# Patient Record
Sex: FEMALE | Race: WHITE | HISPANIC OR LATINO | Employment: OTHER | URBAN - METROPOLITAN AREA
[De-identification: names, ages, dates, MRNs, and addresses within clinical notes are randomized per-mention and may not be internally consistent; named-entity substitution may affect disease eponyms.]

---

## 2017-04-06 ENCOUNTER — HOSPITAL ENCOUNTER (OUTPATIENT)
Facility: HOSPITAL | Age: 82
Discharge: HOME | End: 2017-04-09
Attending: INTERNAL MEDICINE | Admitting: INTERNAL MEDICINE
Payer: MEDICARE

## 2017-04-06 DIAGNOSIS — J44.1 COPD EXACERBATION: ICD-10-CM

## 2017-04-06 PROBLEM — K21.9 GERD (GASTROESOPHAGEAL REFLUX DISEASE): Status: ACTIVE | Noted: 2017-04-06

## 2017-04-06 PROBLEM — E11.9 DIABETES MELLITUS TYPE II: Status: ACTIVE | Noted: 2017-04-06

## 2017-04-06 PROBLEM — I10 HYPERTENSION: Status: ACTIVE | Noted: 2017-04-06

## 2017-04-06 LAB
ANION GAP SERPL CALC-SCNC: 8 MMOL/L
APTT BLDCRRT: 24.6 SEC
BASOPHILS # BLD AUTO: 0.1 K/UL
BASOPHILS NFR BLD: 0.6 %
BUN SERPL-MCNC: 14 MG/DL
CALCIUM SERPL-MCNC: 8.8 MG/DL
CHLORIDE SERPL-SCNC: 105 MMOL/L
CO2 SERPL-SCNC: 28 MMOL/L
CREAT SERPL-MCNC: 1.1 MG/DL
DIFFERENTIAL METHOD: ABNORMAL
EOSINOPHIL # BLD AUTO: 0.2 K/UL
EOSINOPHIL NFR BLD: 1.8 %
ERYTHROCYTE [DISTWIDTH] IN BLOOD BY AUTOMATED COUNT: 16.5 %
EST. GFR  (AFRICAN AMERICAN): 54 ML/MIN/1.73 M^2
EST. GFR  (NON AFRICAN AMERICAN): 47 ML/MIN/1.73 M^2
GLUCOSE SERPL-MCNC: 89 MG/DL
HCT VFR BLD AUTO: 39.1 %
HGB BLD-MCNC: 12.5 G/DL
INR PPP: 1
LYMPHOCYTES # BLD AUTO: 1.5 K/UL
LYMPHOCYTES NFR BLD: 17.6 %
MCH RBC QN AUTO: 23.8 PG
MCHC RBC AUTO-ENTMCNC: 31.9 %
MCV RBC AUTO: 75 FL
MONOCYTES # BLD AUTO: 0.9 K/UL
MONOCYTES NFR BLD: 10.5 %
NEUTROPHILS # BLD AUTO: 5.9 K/UL
NEUTROPHILS NFR BLD: 69.5 %
PLATELET # BLD AUTO: 300 K/UL
PMV BLD AUTO: 8.2 FL
POCT GLUCOSE: 112 MG/DL (ref 70–110)
POTASSIUM SERPL-SCNC: 3.7 MMOL/L
PROTHROMBIN TIME: 10.7 SEC
RBC # BLD AUTO: 5.23 M/UL
SODIUM SERPL-SCNC: 141 MMOL/L
WBC # BLD AUTO: 8.5 K/UL

## 2017-04-06 PROCEDURE — 85730 THROMBOPLASTIN TIME PARTIAL: CPT

## 2017-04-06 PROCEDURE — 99219 PR INITIAL OBSERVATION CARE,LEVL II: CPT | Mod: ,,, | Performed by: INTERNAL MEDICINE

## 2017-04-06 PROCEDURE — 25000242 PHARM REV CODE 250 ALT 637 W/ HCPCS: Performed by: INTERNAL MEDICINE

## 2017-04-06 PROCEDURE — G0379 DIRECT REFER HOSPITAL OBSERV: HCPCS

## 2017-04-06 PROCEDURE — 36415 COLL VENOUS BLD VENIPUNCTURE: CPT

## 2017-04-06 PROCEDURE — 93005 ELECTROCARDIOGRAM TRACING: CPT

## 2017-04-06 PROCEDURE — 83036 HEMOGLOBIN GLYCOSYLATED A1C: CPT

## 2017-04-06 PROCEDURE — 80048 BASIC METABOLIC PNL TOTAL CA: CPT

## 2017-04-06 PROCEDURE — 94640 AIRWAY INHALATION TREATMENT: CPT

## 2017-04-06 PROCEDURE — 63600175 PHARM REV CODE 636 W HCPCS: Performed by: INTERNAL MEDICINE

## 2017-04-06 PROCEDURE — 85025 COMPLETE CBC W/AUTO DIFF WBC: CPT

## 2017-04-06 PROCEDURE — 94761 N-INVAS EAR/PLS OXIMETRY MLT: CPT

## 2017-04-06 PROCEDURE — 87040 BLOOD CULTURE FOR BACTERIA: CPT | Mod: 59

## 2017-04-06 PROCEDURE — 25000003 PHARM REV CODE 250: Performed by: INTERNAL MEDICINE

## 2017-04-06 PROCEDURE — 85610 PROTHROMBIN TIME: CPT

## 2017-04-06 PROCEDURE — 94640 AIRWAY INHALATION TREATMENT: CPT | Mod: 76

## 2017-04-06 PROCEDURE — G0378 HOSPITAL OBSERVATION PER HR: HCPCS

## 2017-04-06 RX ORDER — IBUPROFEN 200 MG
16 TABLET ORAL
Status: DISCONTINUED | OUTPATIENT
Start: 2017-04-06 | End: 2017-04-09 | Stop reason: HOSPADM

## 2017-04-06 RX ORDER — FLUTICASONE FUROATE AND VILANTEROL 100; 25 UG/1; UG/1
1 POWDER RESPIRATORY (INHALATION) DAILY
Status: DISCONTINUED | OUTPATIENT
Start: 2017-04-06 | End: 2017-04-09 | Stop reason: HOSPADM

## 2017-04-06 RX ORDER — HYDROCODONE BITARTRATE AND ACETAMINOPHEN 5; 325 MG/1; MG/1
1 TABLET ORAL EVERY 4 HOURS PRN
Status: DISCONTINUED | OUTPATIENT
Start: 2017-04-06 | End: 2017-04-09 | Stop reason: HOSPADM

## 2017-04-06 RX ORDER — TRIAMTERENE AND HYDROCHLOROTHIAZIDE 37.5; 25 MG/1; MG/1
1 CAPSULE ORAL DAILY
Status: DISCONTINUED | OUTPATIENT
Start: 2017-04-07 | End: 2017-04-07

## 2017-04-06 RX ORDER — GABAPENTIN 300 MG/1
300 CAPSULE ORAL 3 TIMES DAILY
COMMUNITY
End: 2019-04-23 | Stop reason: SDUPTHER

## 2017-04-06 RX ORDER — METHYLPREDNISOLONE SOD SUCC 125 MG
62.5 VIAL (EA) INJECTION EVERY 12 HOURS
Status: DISCONTINUED | OUTPATIENT
Start: 2017-04-06 | End: 2017-04-08

## 2017-04-06 RX ORDER — CODEINE PHOSPHATE AND GUAIFENESIN 10; 100 MG/5ML; MG/5ML
5 SOLUTION ORAL EVERY 4 HOURS PRN
COMMUNITY
End: 2017-12-19

## 2017-04-06 RX ORDER — ATORVASTATIN CALCIUM 10 MG/1
10 TABLET, FILM COATED ORAL DAILY
COMMUNITY
End: 2019-04-23 | Stop reason: SDUPTHER

## 2017-04-06 RX ORDER — IPRATROPIUM BROMIDE AND ALBUTEROL SULFATE 2.5; .5 MG/3ML; MG/3ML
3 SOLUTION RESPIRATORY (INHALATION)
Status: DISCONTINUED | OUTPATIENT
Start: 2017-04-06 | End: 2017-04-08

## 2017-04-06 RX ORDER — INSULIN ASPART 100 [IU]/ML
0-5 INJECTION, SOLUTION INTRAVENOUS; SUBCUTANEOUS
Status: DISCONTINUED | OUTPATIENT
Start: 2017-04-06 | End: 2017-04-09 | Stop reason: HOSPADM

## 2017-04-06 RX ORDER — GLIMEPIRIDE 1 MG/1
1 TABLET ORAL
Status: DISCONTINUED | OUTPATIENT
Start: 2017-04-07 | End: 2017-04-09 | Stop reason: HOSPADM

## 2017-04-06 RX ORDER — IBUPROFEN 200 MG
24 TABLET ORAL
Status: DISCONTINUED | OUTPATIENT
Start: 2017-04-06 | End: 2017-04-09 | Stop reason: HOSPADM

## 2017-04-06 RX ORDER — CLONIDINE HYDROCHLORIDE 0.1 MG/1
0.1 TABLET ORAL 2 TIMES DAILY
Status: DISCONTINUED | OUTPATIENT
Start: 2017-04-06 | End: 2017-04-06

## 2017-04-06 RX ORDER — LOSARTAN POTASSIUM 25 MG/1
50 TABLET ORAL DAILY
Status: DISCONTINUED | OUTPATIENT
Start: 2017-04-06 | End: 2017-04-09 | Stop reason: HOSPADM

## 2017-04-06 RX ORDER — CLONIDINE HYDROCHLORIDE 0.1 MG/1
0.1 TABLET ORAL 2 TIMES DAILY PRN
Status: DISCONTINUED | OUTPATIENT
Start: 2017-04-06 | End: 2017-04-09 | Stop reason: HOSPADM

## 2017-04-06 RX ORDER — MOXIFLOXACIN HYDROCHLORIDE 400 MG/250ML
400 INJECTION, SOLUTION INTRAVENOUS
Status: DISCONTINUED | OUTPATIENT
Start: 2017-04-06 | End: 2017-04-09 | Stop reason: HOSPADM

## 2017-04-06 RX ORDER — FLUTICASONE FUROATE AND VILANTEROL 100; 25 UG/1; UG/1
1 POWDER RESPIRATORY (INHALATION) DAILY
Status: DISCONTINUED | OUTPATIENT
Start: 2017-04-06 | End: 2017-04-06 | Stop reason: SDUPTHER

## 2017-04-06 RX ORDER — DOXYCYCLINE 100 MG/1
100 CAPSULE ORAL EVERY 12 HOURS
COMMUNITY
End: 2017-12-19

## 2017-04-06 RX ORDER — GLUCAGON 1 MG
1 KIT INJECTION
Status: DISCONTINUED | OUTPATIENT
Start: 2017-04-06 | End: 2017-04-09 | Stop reason: HOSPADM

## 2017-04-06 RX ORDER — ACETAMINOPHEN 325 MG/1
650 TABLET ORAL EVERY 6 HOURS PRN
Status: DISCONTINUED | OUTPATIENT
Start: 2017-04-06 | End: 2017-04-09 | Stop reason: HOSPADM

## 2017-04-06 RX ORDER — METOPROLOL TARTRATE 25 MG/1
25 TABLET, FILM COATED ORAL 2 TIMES DAILY
Status: DISCONTINUED | OUTPATIENT
Start: 2017-04-06 | End: 2017-04-09 | Stop reason: HOSPADM

## 2017-04-06 RX ORDER — IPRATROPIUM BROMIDE AND ALBUTEROL SULFATE 2.5; .5 MG/3ML; MG/3ML
3 SOLUTION RESPIRATORY (INHALATION)
Status: DISCONTINUED | OUTPATIENT
Start: 2017-04-06 | End: 2017-04-06 | Stop reason: SDUPTHER

## 2017-04-06 RX ORDER — ENOXAPARIN SODIUM 100 MG/ML
40 INJECTION SUBCUTANEOUS EVERY 24 HOURS
Status: DISCONTINUED | OUTPATIENT
Start: 2017-04-06 | End: 2017-04-09 | Stop reason: HOSPADM

## 2017-04-06 RX ORDER — BUDESONIDE AND FORMOTEROL FUMARATE DIHYDRATE 160; 4.5 UG/1; UG/1
2 AEROSOL RESPIRATORY (INHALATION) EVERY 12 HOURS
Status: DISCONTINUED | OUTPATIENT
Start: 2017-04-06 | End: 2017-04-06

## 2017-04-06 RX ORDER — PANTOPRAZOLE SODIUM 40 MG/1
40 TABLET, DELAYED RELEASE ORAL DAILY
Status: DISCONTINUED | OUTPATIENT
Start: 2017-04-06 | End: 2017-04-09 | Stop reason: HOSPADM

## 2017-04-06 RX ADMIN — FLUTICASONE FUROATE AND VILANTEROL TRIFENATATE 1 PUFF: 100; 25 POWDER RESPIRATORY (INHALATION) at 03:04

## 2017-04-06 RX ADMIN — IPRATROPIUM BROMIDE AND ALBUTEROL SULFATE 3 ML: .5; 3 SOLUTION RESPIRATORY (INHALATION) at 07:04

## 2017-04-06 RX ADMIN — LOSARTAN POTASSIUM 50 MG: 25 TABLET, FILM COATED ORAL at 03:04

## 2017-04-06 RX ADMIN — MOXIFLOXACIN HYDROCHLORIDE 400 MG: 400 INJECTION, SOLUTION INTRAVENOUS at 03:04

## 2017-04-06 RX ADMIN — METOPROLOL TARTRATE 25 MG: 25 TABLET ORAL at 08:04

## 2017-04-06 RX ADMIN — ENOXAPARIN SODIUM 40 MG: 100 INJECTION SUBCUTANEOUS at 07:04

## 2017-04-06 RX ADMIN — IPRATROPIUM BROMIDE AND ALBUTEROL SULFATE 3 ML: .5; 3 SOLUTION RESPIRATORY (INHALATION) at 03:04

## 2017-04-06 RX ADMIN — METHYLPREDNISOLONE SODIUM SUCCINATE 62.5 MG: 125 INJECTION, POWDER, FOR SOLUTION INTRAMUSCULAR; INTRAVENOUS at 08:04

## 2017-04-06 RX ADMIN — PANTOPRAZOLE SODIUM 40 MG: 40 TABLET, DELAYED RELEASE ORAL at 03:04

## 2017-04-06 NOTE — ASSESSMENT & PLAN NOTE
Caller would like to discuss an/a Return call for results . Writer advised caller of callback within 24-72 hours.    Patient Name: Julieth Flower  Caller Name: SAME   Name of Facility:   Fax Number:   Callback Number: [phone] (M)    Additional Info:  She would like to talk to the nurse. She states that she never received her PAP results from a couple of months ago. She also states that she has a bacterial infection or yeast infection and is wondering if she can be prescribed something for it.       Thank you,  Maribel Hauser     Supplemental O2 via nasal canula; titrate O2 saturation to >92%.   Start Solumedrol 62.5 mg IV q 12 hrs.   Continue beta 2 agonist bronchodilator treatments.   Continue IV antibiotics.  Check sputum GS and Cx.   Continue routine medications as before.

## 2017-04-06 NOTE — PROGRESS NOTES
Unable to verify medications with pt's daughter Michelle. Phone call placed to 's office, voicemail left, awaiting call back.

## 2017-04-06 NOTE — PLAN OF CARE
04/06/17 1536   Patient Assessment/Suction   Level of Consciousness (AVPU) alert   Respiratory Effort Normal;Unlabored   All Lung Fields Breath Sounds coarse   Cough Frequency frequent   Cough Type good;nonproductive   PRE-TX-O2-ETCO2   O2 Device (Oxygen Therapy) room air   SpO2 97 %   Pulse Oximetry Type Intermittent   $ Pulse Oximetry - Multiple Charge Pulse Oximetry - Multiple   Pulse 80   Resp 16   Positioning Sitting in bed   Aerosol Therapy   $ Aerosol Therapy Charges Aerosol Treatment   Respiratory Treatment Status given   SVN/Inhaler Treatment Route mask   Position During Treatment Sitting in bed   Patient Tolerance good   Inhaler   $ Inhaler Charges MDI (Metered Dose [place]ah[place]) Treatment   Respiratory Treatment Status given   SVN/Inhaler Treatment Route mouthpiece   Patient Tolerance good   Post-Treatment   Post-treatment Heart Rate (beats/min) 83   Post-treatment Resp Rate (breaths/min) 16   All Fields Breath Sounds aeration increased

## 2017-04-06 NOTE — IP AVS SNAPSHOT
[address] [place] [address]  Phone: [phone]           Instrucciones de Ranjana de Pacientes  Nuestra meta es prepararlo para el éxito. Kathy paquete incluye información sobre torrez condición, medicamentos e instrucciones para cuidados en el hogar. Linnell Camp le ayudará a cuidarse y prevenir la necesidad de volver al hospital.     Por favor, hable con torrez enfermero o enfermera si tiene alguna pregunta..     Hay muchos detalles a recordar cuando se prepara para salir del hospital. Linnell Camp es lo que necesita hacer:    1. Bonney torrez medicina. Si usted tiene kalin receta, revise la lista de medicamentos en las siguientes páginas. Es posible que tenga nuevos medicamentos para recoger en la farmacia y otros que tendrá que dejar de guanako. Revise las instrucciones sobre cómo y cuándo guanako sarah medicamentos. Consulte con el médico o el enfermeras si no está seguro de qué hacer.    2. Ir a sarah citas de seguimiento. La información específica de seguimiento aparece en las siguientes páginas. Usted puede ser contactado por kalin enfermera o proveedor clínico sobre las próximas citas. Estar seguro de que tiene todos los números de teléfono para comunicarse si es necesario. Por favor, póngase en contacto con la oficina de torrez profesional médico si no puede hacer kalin breann.     3. Esté atento a señales de advertencia. El médico o la enfermera le dará señales de alarma detallados que debe observar y cuándo llamar para la ayuda. Estas instrucciones también pueden incluir información educativa acerca de torrez condición. Si usted experimenta cualquiera de las señales de advertencia para torrez zohreh, llame a torrez médico.             [facility]    Si torrez médico no le ha indicado a lo contrário, por favor   contactar a [facility], nuestra línea de cuidado de enfermeras está disponible para asistirle 24/7.    [phone] (servicio gratuito)    Enfermeras registradas de [facility] pueden ayudarle a  reservar kalin breann, proveer educación para la zohreh, asesoría clínica, y otros servicios de asesoramiento.                  ** Verificar la lista de medicamentos es correcta y está actualizada. Llevar esto con usted en librado de emergencia. Si sarah medicamentos white cambiado, por favor notifique a torrez proveedor de atención médica.             Lista de medicamentos      EMPIECE a guanako estos medicamentos        Additional Info                      albuterol 90 mcg/actuation inhaler   Cantidad:  1 Inhaler   Recargas:  1   Dosis:  2 puff    Instrucciones:  Inhale 2 puffs into the lungs every 6 (six) hours as needed for Wheezing. Rescue     Begin Date    AM    Noon    PM    Bedtime       moxifloxacin 400 mg tablet   También conocido galindo:  AVELOX   Cantidad:  10 tablet   Recargas:  0   Dosis:  400 mg    Instrucciones:  Take 1 tablet (400 mg total) by mouth once daily.     Begin Date    AM    Noon    PM    Bedtime       predniSONE 50 MG Tab   También conocido galindo:  DELTASONE   Cantidad:  5 tablet   Recargas:  0   Dosis:  50 mg    Instrucciones:  Take 1 tablet (50 mg total) by mouth once daily.     Begin Date    AM    Noon    PM    Bedtime         SIGA tomando estos medicamentos        Additional Info                      atorvastatin 10 MG tablet   También conocido galindo:  LIPITOR   Recargas:  0   Dosis:  10 mg    Instrucciones:  Take 10 mg by mouth once daily.     Begin Date    AM    Noon    PM    Bedtime       doxycycline 100 MG capsule   También conocido galindo:  MONODOX   Recargas:  0   Dosis:  100 mg    Instrucciones:  Take 100 mg by mouth every 12 (twelve) hours.     Begin Date    AM    Noon    PM    Bedtime       FOSAMAX ORAL   Recargas:  0   Dosis:  1 tablet    Instrucciones:  Take 1 tablet by mouth once a week. 70mg po weekly     Begin Date    AM    Noon    PM    Bedtime       gabapentin 300 MG capsule   También conocido galindo:  NEURONTIN   Recargas:  0   Dosis:  300 mg    Instrucciones:  Take 300 mg by mouth 3 (three)  times daily.     Begin Date    AM    Noon    PM    Bedtime       glimepiride 1 MG tablet   También conocido galindo:  AMARYL   Recargas:  0   Dosis:  2 mg    Última administración:  1 mg on 4/9/2017  6:49 AM   Instrucciones:  Take 2 mg by mouth before breakfast.     Begin Date    AM    Noon    PM    Bedtime       guaifenesin-codeine 100-10 mg/5 ml  mg/5 mL syrup   También conocido galindo:  TUSSI-ORGANIDIN NR   Recargas:  0   Dosis:  5 mL    Instrucciones:  Take 5 mLs by mouth every 4 (four) hours as needed for Cough.     Begin Date    AM    Noon    PM    Bedtime       losartan 50 MG tablet   También conocido galindo:  COZAAR   Recargas:  0    Última administración:  50 mg on 4/9/2017  9:59 AM     Begin Date    AM    Noon    PM    Bedtime       meclizine 12.5 mg tablet   También conocido galindo:  ANTIVERT   Recargas:  0      Begin Date    AM    Noon    PM    Bedtime       metoprolol tartrate 25 MG tablet   También conocido galindo:  LOPRESSOR   Recargas:  0   Dosis:  25 mg    Última administración:  25 mg on 4/9/2017  9:59 AM   Instrucciones:  Take 25 mg by mouth 2 (two) times daily.     Begin Date    AM    Noon    PM    Bedtime       NEXIUM 40 MG capsule   Recargas:  0   Medicamento genérico:  esomeprazole      Begin Date    AM    Noon    PM    Bedtime       ramipril 2.5 MG capsule   También conocido galindo:  ALTACE   Recargas:  0   Dosis:  2.5 mg    Instrucciones:  Take 2.5 mg by mouth once daily.     Begin Date    AM    Noon    PM    Bedtime       salicylic acid 6 % Crea   También conocido galindo:  SALACYN   Cantidad:  454 g   Recargas:  11   Dosis:  1 application    Instrucciones:  Apply 1 application topically 2 (two) times daily.     Begin Date    AM    Noon    PM    Bedtime       SYMBICORT 160-4.5 mcg/actuation Hfaa   Recargas:  1   Medicamento genérico:  budesonide-formoterol 160-4.5 mcg    Instrucciones:  INL 2 PFS PO BID     Begin Date    AM    Noon    PM    Bedtime       VOLTAREN 1 % Gel   Recargas:  0   Medicamento  genérico:  diclofenac sodium      Begin Date    AM    Noon    PM    Bedtime            Dónde puede recoger sarah medicamentos      Estos medicamentos fueron enviados a [facility] [address] [place] [address], [place] - [address] [facility] AT [facility]  [address] [facility], [place] [address]    Horas:  24-horas Teléfono:  [phone]     albuterol 90 mcg/actuation inhaler    moxifloxacin 400 mg tablet    predniSONE 50 MG Tab                  Por favor traiga a todos las citas de seguimiento:    1. Melanie copia de las instrucciones de sánchez.  2. Todos los medicamentos que está tomando kallie momento, en sarah envases originales.  3. Identificación y tarjeta de seguro.    Por favor llegue 15 minutos antes de la hora de la breann.    Por favor llame con 24 horas de antelación si tiene que cambiar torrez breann y / o tiempo.        Información de seguimiento     Realice un seguimiento con:  Mervin Killian MD    Cuándo:  4/16/2017    Especialidad:  Family Medicine    Información de contacto:    [address] [facility]  [place] [address] [phone]          Instrucciones de sánchez     [place]es [place]    Call MD for:  difficulty breathing or increased cough     Call MD for:  increased confusion or weakness     Call MD for:  persistent nausea and vomiting or diarrhea     Call MD for:  temperature >100.4     Diet general     Preguntas:    Total calories:      Fat restriction, if any:      Protein restriction, if any:      Na restriction, if any:      Fluid restriction:      Additional restrictions:        Referencias/Adjuntos de sánchez     COPD, TREATMENT FOR (Macedonian)    COPD, WHAT IS (Macedonian)    CHRONIC OBSTRUCTIVE PULMONARY DISEASE (COPD), DISCHARGE INSTRUCTIONS (Macedonian)        Primary Diagnosis     Torrez diagnosis primaria es:  Chronic Bronchitis      Información de Admisión     Fecha y hora Proveedor Departamento [facility]    4/6/2017 11:27 AM Kathy Felipe MD [facility] [phone]      Los proveedores de cuidado     Personal Médico  "Rol Especialidad Teléfono principal de la oficina    Kathy Felipe MD Attending Provider Internal Medicine [phone]      Kerry signos vitales luís     PS Pulso Temperatura Resp Hedley Peso    132/60 86 97.9 °F (36.6 °C) (Oral) 18 4' 8" (1.422 m) 69.7 kg (153 lb 10.6 oz)    Ultima menstruación SpO2 BMI (IMC)             (LMP Unknown) 96% 34.45 kg/m2         Recent Lab Values        4/6/2017                           2:47 PM           A1C 5.9           Comment for A1C at  2:47 PM on 4/6/2017:  According to ADA guidelines, hemoglobin A1C <7.0% represents  optimal control in non-pregnant diabetic patients.  Different  metrics may apply to specific populations.   Standards of Medical Care in Diabetes - 2016.  For the purpose of screening for the presence of diabetes:  <5.7%     Consistent with the absence of diabetes  5.7-6.4%  Consistent with increasing risk for diabetes   (prediabetes)  >or=6.5%  Consistent with diabetes  Currently no consensus exists for use of hemoglobin A1C  for diagnosis of diabetes for children.        Pending Labs     Order Current Status    Blood culture (site 1) Preliminary result    Blood culture (site 2) Preliminary result    Culture, Respiratory with Gram Stain Preliminary result      Alergias     A partir del:  4/9/2017           Reacciones    Pcn [Penicillins] Anaphylaxis      Directiva Anticipada     Melanie directiva anticipada es un documento que, en librado de que ya no capaz de hacer decisiones por sí mismo, le dice a torrez equipo médico qué tipo de tratamiento quiere o no quiere recibir, o que le gustaría guanako esas decisiones para usted . Si actualmente no tiene melanie directiva anticipada, [facility] le anima a crear ayaka. Para más información llame al: [phone] ([phone]), o entrando en [email or web address].        Language Assistance Services     ATTENTION: Language assistance services are available, free of charge. Please call [phone].  "     ATENCIÓN: Si habla español, tiene a torrez disposición servicios gratuitos de asistencia lingüística. Llame al [phone].     [facility] Ý: N?u b?n nói Ti?ng Vi?t, có các d?ch v? h? tr? ngôn ng? mi?n phí dành cho b?n. G?i s? [phone].        Instrucciones de sánchez para la diabetes       Diabetes (Información general)  La diabetes es un problema de zohreh a tomer plazo que significa que torrez cuerpo no produce la suficiente insulina. O también puede significar que torrez cuerpo no puede utilizar la insulina que produce. La insulina es kalin hormona en torrez organismo, que permite que el azúcar en la sidra (glucosa) llegue a las células en torrez cuerpo. Todas sarah células necesitan glucosa galindo combustible.  Cuando usted tiene diabetes la glucosa en torrez cuerpo se acumula porque no puede llegar hasta las células. Esta acumulación se conoce galindo un nivel alto de azúcar en la sidra (hiperglucemia).  Torrez nivel de azúcar en la sidra depende de varias cosas. Depende de la clase de alimentos que usted come y de cuánto come. También depende de cuánto ejercicio hace y de cuánta insulina tiene en torrez organismo. [place] mucho de las clases indebidas de alimentos o no neyda a tiempo los medicamentos para la diabetes puede causar con el tiempo un nivel alto de azúcar en la sidra. Las infecciones pueden provocar un nivel alto de azúcar en la sidra, incluso si está tomando sarah medicamentos correctamente.  Estas cosas también pueden causar un nivel bajo de azúcar en la sidra:  · Saltarse las comidas  · No comer suficientes alimentos  · Neyda demasiado de un medicamento para la diabetes  Con el tiempo, si no se trata la diabetes puede causar problemas serios. Estos problemas incluyen enfermedades del corazón, ataque cerebral, insuficiencia renal y ceguera. También pueden incluir dolor en los nervios o pérdida de sensación en sarah piernas o pies. Al mantener torrez azúcar en la sidra bajo control usted puede prevenir o retrasar estos problemas.  Los  niveles normales en la sidra son de 80 a 130 antes de las comidas y menos de 180 de 1 a 2 horas después de comer.    Cuidados en el hogar  Cuando se esté cuidando usted mismo en torrez hogar, siga estas pautas:  · Siga la dieta que le recomiende torrez proveedor de atención médica.  Use la insulina o cualquier otro medicamento para la diabetes, exactamente galindo se lo indiquen.  · Vigile sarah niveles de azúcar en la sidra galindo le indiquen. Lleve un registro con los resultados. [place] le ayudará a torrez proveedor a cambiar sarah medicamentos para mantener el azúcar en la sidra bajo control.   · Trate de alcanzar torrez peso ideal. Es posible que pueda reducir o dejar de guanako medicamentos para la diabetes si come los alimentos adecuados y hace ejercicio.  · No fume. Fumar hace que los efectos de la diabetes empeoren en torrez circulación. Si usted fuma y tiene diabetes es mucho más probable que sufra un ataque al corazón.   · Cuídese arabella sarah pies. Si ricci perdido la sensación en sraah pies, es posible que no bri kalin herida o kalin infección. Revise sarah pies y la joshua entre los dedos por lo menos kalin vez a la semana.    · Utilice torrez brazalete de alerta médica o lleve kalin tarjeta en torrez billetera que diga que usted tiene diabetes. [place] les ayudará a los proveedores de atención médica a brindarle los cuidados adecuados si usted se enferma hasta el punto que no pueda decirles que tiene diabetes.  Plan para un día de enfermedad  Si usted contrae un resfriado, la gripe o kalin infección viral, siga estos pasos:  · Revise torrez plan para un día de enfermedad de la diabetes y llame a torrez proveedor de atención médica galindo le indicaron que lo hiciera. Es posible que deba llamar al proveedor de inmediato si:  ¨ Torrez nivel de azúcar en la sidra está por encima de 240 a pesar de estar tomando los medicamentos para la diabetes  ¨ Los niveles de cetonas en torrez orina están por encima de lo normal o altos  ¨ Ha estado vomitando gudelia más de 6 horas  ¨ Tiene  dificultades para respirar  ¨ Tiene kalin fiebre sánchez  ¨ Tiene fiebre gudelia varios días y no mejora  ¨ Se siente más aturdido o somnoliento de lo usual  · Siga tomando sarah pastillas para la diabetes (medicamentos orales) incluso si ha estado vomitando y se siente muy enfermo. Llame a torrez proveedor de inmediato porque es posible que necesite insulina para bajar los niveles de azúcar en la sidra hasta que se recupere de torrez enfermedad.    · Monitoree torrez insulina incluso si ha estado vomitando y se siente muy enfermo. Llame a torrez proveedor de inmediato y pregunte si necesita cambiar torrez dosis de insulina. [place] dependerá de los resultados de sarah niveles de azúcar en la sidra.   · Revise torrez nivel de azúcar en la sidra cada 2 a 4 horas, o por lo menos 4 veces al día.  · Revise con frecuencia sarah cetonas. Si está vomitando y tiene diarrea, revíselas con más frecuencia.  · No se salte comidas. Trate de comer comidas pequeñas a intervalos regulares. Hágalo aunque no sienta gana de comer.  · Antonina agua y otros líquidos que no contengan cafeína o calorías. [place] prevendrá que se deshidrate. Si tiene náuseas o vómito, tome pequeños sorbos cada 5 minutos. Para prevenir la deshidratación trate de beber kalin taza (8 onzas) de líquido cada hora mientras esté despierto.  Cuidados generales  Siempre lleve con usted kalin sarita de azúcar de acción rápida para el librado de que tenga síntomas de un nivel bajo de azúcar en la sidra (menos de 70). A las primeras señales de un nivel bajo de azúcar en la sidra, coma o antonina de 15 a 20 gramos de azúcar de acción rápida para elevar el nivel de azúcar en la sidra. Algunos ejemplos son:    · 3 a 4 tabletas de glucosa. Estas pueden comprarse en la mayoría de las farmacias.  · 4 onzas (1/2 taza) de un refresco (que no sea de dieta)  · 4 onzas (1/2 taza) de cualquier jugo de fruta  · 8 onzas (1 taza) de leche  · 5 a 6 unidades de caramelos duros  · 1 cucharada de miel  Revise torrez nivel de azúcar en la  sidra 15 minutos después de tratarse. Si sigue por debajo de 70, tome de 15 a 20 gramos más de azúcar de acción rápida. Vuelva a revisarse en 15 minutos. Si regresa a lo normal (70 o más), coma un bocadillo o kalin comida para mantener el azúcar en la sidra dentro de un nivel seguro. Si permanece bajo, llame a torrez médico o vaya a kalin laquita de emergencias.  Cuidado de seguimiento  Nell kalin breann de seguimiento con torrez proveedor de atención médica, o galindo le indiquen. Para más información acerca de la diabetes, visite la página web de la Asociación Americana de la Diabetes (American Diabetes Association) en www.diabetes.org o llame al [phone].  Cuándo buscar consejo médico  Llame a torrez proveedor de atención médica de inmediato si tiene cualquiera de estos síntomas de un nivel alto de azúcar en la sidra:    · Orina frecuentemente  · Mareos  · Somnolencia  · Sed  · Dolor de kerri  · Náuseas o vómito  · Dolor abdominal  · Cambios en la vista  · Respiración rápida  · Confusión o pérdida del conocimiento  También llame a torrez proveedor de inmediato si tiene alguna de estas señales de un nivel bajo de azúcar en la sidra:    · Fatiga  · Dolor de kerri  · Temblores  · Sudoración excesiva  · Hambre  · Se siente ansioso o inquieto  · Cambios en la vista  · Somnolencia  · Debilidad  · Confusión o pérdida del conocimiento  Llame a torrez proveedor de inmediato si cualquiera de estos ocurre:  · Dolor en el pecho o falta de aire  · Mareos o desmayos  · Debilidad en un brazo o kalin pierna, o en un lado de la manpreet  · Dificultad para hablar o para rich   Date Last Reviewed: 6/1/2016  © 0619-6048 [employer]. [address] [place] [address], [place] [address]. Todos los derechos reservados. Esta información no pretende sustituir la atención médica profesional. Sólo torrez médico puede diagnosticar y tratar un problema de zohreh.              Registrarse para [facility]     La activación de torrez cuenta [facility] es tan fácil galindo 1-2-3!    1)  Ir a [email or web address], seleccione Registrarse Ahora, meter el código de activación y torrez fecha de nacimiento, y seleccione Próximo.    [id number]  Expires: 5/24/2017 12:51 PM      2) Crear un nombre de usuario y contraseña para usar cuando se visita [facility] en el futuro y selecciona kalin pregunta de seguridad en librado de que pierda torrez contraseña y seleccione Próximo.    3) Introduzca torrez dirección de correo electrónico y yane clic en Registrarse!    Información Adicional  Si tiene alguna pregunta, por favor, e-mail [email or web address] o llame al [phone] para hablar con nuestro personal. Recuerde, [facility] no debe ser usada para necesidades urgentes. En librado de emergencia médica, llame al 911.         [facility] cumple con las leyes federales aplicables de derechos civiles y no discrimina por motivos de rosa, color, origen nacional, edad, discapacidad, o sexo.                        [address] [place] [address]  Phone: [phone]           Patient Discharge Instructions   Our goal is to set you up for success. This packet includes information on your condition, medications, and your home care.  It will help you care for yourself to prevent having to return to the hospital.     Please ask your nurse if you have any questions.      There are many details to remember when preparing to leave the hospital. Here is what you will need to do:    1. Take your medicine. If you are prescribed medications, review your Medication List on the following pages. You may have new medications to  at the pharmacy and others that you'll need to stop taking. Review the instructions for how and when to take your medications. Talk with your doctor or nurses if you are unsure of what to do.     2. Go to your follow-up appointments. Specific follow-up information is listed in the following pages. Your may be contacted by a nurse or clinical provider about future  appointments. Be sure we have all of the phone numbers to reach you. Please contact your provider's office if you are unable to make an appointment.     3. Watch for warning signs. Your doctor or nurse will give you detailed warning signs to watch for and when to call for assistance. These instructions may also include educational information about your condition. If you experience any of warning signs to your health, call your doctor.           [facility]  Unless otherwise directed by your provider, please   contact [facility], our nurse care line   that is available for 24/7 assistance.     [phone] (toll-free)     Registered nurses in the [facility]   provide: appointment scheduling, clinical advisement, health education, and other advisory services.              ** Verificar la lista de medicamentos es correcta y está actualizada. Llevar esto con usted en librado de emergencia. Si sarah medicamentos white cambiado, por favor notifique a torrez proveedor de atención médica.             Medication List      START taking these medications        Additional Info                      albuterol 90 mcg/actuation inhaler   Quantity:  1 Inhaler   Refills:  1   Dose:  2 puff    Instructions:  Inhale 2 puffs into the lungs every 6 (six) hours as needed for Wheezing. Rescue     Begin Date    AM    Noon    PM    Bedtime       moxifloxacin 400 mg tablet   Commonly known as:  AVELOX   Quantity:  10 tablet   Refills:  0   Dose:  400 mg    Instructions:  Take 1 tablet (400 mg total) by mouth once daily.     Begin Date    AM    Noon    PM    Bedtime       predniSONE 50 MG Tab   Commonly known as:  DELTASONE   Quantity:  5 tablet   Refills:  0   Dose:  50 mg    Instructions:  Take 1 tablet (50 mg total) by mouth once daily.     Begin Date    AM    Noon    PM    Bedtime         CONTINUE taking these medications        Additional Info                      atorvastatin 10 MG tablet   Commonly known as:  LIPITOR    Refills:  0   Dose:  10 mg    Instructions:  Take 10 mg by mouth once daily.     Begin Date    AM    Noon    PM    Bedtime       doxycycline 100 MG capsule   Commonly known as:  MONODOX   Refills:  0   Dose:  100 mg    Instructions:  Take 100 mg by mouth every 12 (twelve) hours.     Begin Date    AM    Noon    PM    Bedtime       FOSAMAX ORAL   Refills:  0   Dose:  1 tablet    Instructions:  Take 1 tablet by mouth once a week. 70mg po weekly     Begin Date    AM    Noon    PM    Bedtime       gabapentin 300 MG capsule   Commonly known as:  NEURONTIN   Refills:  0   Dose:  300 mg    Instructions:  Take 300 mg by mouth 3 (three) times daily.     Begin Date    AM    Noon    PM    Bedtime       glimepiride 1 MG tablet   Commonly known as:  AMARYL   Refills:  0   Dose:  2 mg    Last time this was given:  1 mg on 4/9/2017  6:49 AM   Instructions:  Take 2 mg by mouth before breakfast.     Begin Date    AM    Noon    PM    Bedtime       guaifenesin-codeine 100-10 mg/5 ml  mg/5 mL syrup   Commonly known as:  TUSSI-ORGANIDIN NR   Refills:  0   Dose:  5 mL    Instructions:  Take 5 mLs by mouth every 4 (four) hours as needed for Cough.     Begin Date    AM    Noon    PM    Bedtime       losartan 50 MG tablet   Commonly known as:  COZAAR   Refills:  0    Last time this was given:  50 mg on 4/9/2017  9:59 AM     Begin Date    AM    Noon    PM    Bedtime       meclizine 12.5 mg tablet   Commonly known as:  ANTIVERT   Refills:  0      Begin Date    AM    Noon    PM    Bedtime       metoprolol tartrate 25 MG tablet   Commonly known as:  LOPRESSOR   Refills:  0   Dose:  25 mg    Last time this was given:  25 mg on 4/9/2017  9:59 AM   Instructions:  Take 25 mg by mouth 2 (two) times daily.     Begin Date    AM    Noon    PM    Bedtime       NEXIUM 40 MG capsule   Refills:  0   Generic drug:  esomeprazole      Begin Date    AM    Noon    PM    Bedtime       ramipril 2.5 MG capsule   Commonly known as:  ALTACE   Refills:  0    Dose:  2.5 mg    Instructions:  Take 2.5 mg by mouth once daily.     Begin Date    AM    Noon    PM    Bedtime       salicylic acid 6 % Crea   Commonly known as:  SALACYN   Quantity:  454 g   Refills:  11   Dose:  1 application    Instructions:  Apply 1 application topically 2 (two) times daily.     Begin Date    AM    Noon    PM    Bedtime       SYMBICORT 160-4.5 mcg/actuation Hfaa   Refills:  1   Generic drug:  budesonide-formoterol 160-4.5 mcg    Instructions:  INL 2 PFS PO BID     Begin Date    AM    Noon    PM    Bedtime       VOLTAREN 1 % Gel   Refills:  0   Generic drug:  diclofenac sodium      Begin Date    AM    Noon    PM    Bedtime            Where to Get Your Medications      These medications were sent to "[facility] [address] [place] [address] [address] [facility] AT [facility]  [address] [facility], [facility] [id number]    Hours:  24-hours Phone:  [phone]     albuterol 90 mcg/actuation inhaler    moxifloxacin 400 mg tablet    predniSONE 50 MG Tab                  Por favor traiga a todos las citas de seguimiento:    1. Melanie copia de las instrucciones de sánchez.  2. Todos los medicamentos que está tomando kallie momento, en sarah envases originales.  3. Identificación y tarjeta de seguro.    Por favor llegue 15 minutos antes de la hora de la breann.    Por favor llame con 24 horas de antelación si tiene que cambiar torrez breann y / o tiempo.        Follow-up Information     Follow up with Mervin Killian MD In 1 week.    Specialty:  Family Medicine    Contact information:    [address] [facility]  [place] [address] [phone]          Discharge Instructions     Future Orders    Call MD for:  difficulty breathing or increased cough     Call MD for:  increased confusion or weakness     Call MD for:  persistent nausea and vomiting or diarrhea     Call MD for:  temperature >100.4     Diet general     Questions:    Total calories:      Fat restriction, if any:      Protein restriction, if any:      Na  "restriction, if any:      Fluid restriction:      Additional restrictions:        Discharge References/Attachments     COPD, TREATMENT FOR (Iranian)    COPD, WHAT IS (Iranian)    CHRONIC OBSTRUCTIVE PULMONARY DISEASE (COPD), DISCHARGE INSTRUCTIONS (Iranian)        Primary Diagnosis     Your primary diagnosis was:  Chronic Bronchitis      Admission Information     Date & Time Provider Department CSN    4/6/2017 11:27 AM Kathy Felipe MD [facility] [id number]      Care Providers     Provider Role Specialty Primary office phone    Kathy Felipe MD Attending Provider Internal Medicine [phone]      Your Vitals Were     BP Pulse Temp Resp Height Weight    132/60 86 97.9 °F (36.6 °C) (Oral) 18 4' 8" (1.422 m) 69.7 kg (153 lb 10.6 oz)    Last Period SpO2 BMI             (LMP Unknown) 96% 34.45 kg/m2         Recent Lab Values        4/6/2017                           2:47 PM           A1C 5.9           Comment for A1C at  2:47 PM on 4/6/2017:  According to ADA guidelines, hemoglobin A1C <7.0% represents  optimal control in non-pregnant diabetic patients.  Different  metrics may apply to specific populations.   Standards of Medical Care in Diabetes - 2016.  For the purpose of screening for the presence of diabetes:  <5.7%     Consistent with the absence of diabetes  5.7-6.4%  Consistent with increasing risk for diabetes   (prediabetes)  >or=6.5%  Consistent with diabetes  Currently no consensus exists for use of hemoglobin A1C  for diagnosis of diabetes for children.        Pending Labs     Order Current Status    Blood culture (site 1) Preliminary result    Blood culture (site 2) Preliminary result    Culture, Respiratory with Gram Stain Preliminary result      Allergies as of 4/9/2017        Reactions    Pcn [Penicillins] Anaphylaxis      Advance Directives     An advance directive is a document which, in the event you are no longer able to make decisions for yourself, tells your healthcare team what " kind of treatment you do or do not want to receive, or who you would like to make those decisions for you.  If you do not currently have an advance directive, [facility] encourages you to create one.  For more information call:  [phone]), [phone] ([phone]),  or log on to [email or web address].        Language Assistance Services     ATTENTION: Language assistance services are available, free of charge. Please call [phone].      ATENCIÓN: Si habla español, tiene a torrez disposición servicios gratuitos de asistencia lingüística. Llame al [phone].     CHÚ Ý: N?u b?n nói Ti?ng Vi?t, có các d?ch v? h? tr? ngôn ng? mi?n phí dành cho b?n. G?i s? [phone].        Diabetes Discharge Instructions                                   [facility] Sign-Up     Activating your [facility] account is as easy as 1-2-3!     1) Visit [email or web address], select Sign Up Now, enter this activation code and your date of birth, then select Next.  [email or web address]  Expires: 5/24/2017 12:51 PM      2) Create a username and password to use when you visit [facility] in the future and select a security question in case you lose your password and select Next.    3) Enter your e-mail address and click Sign Up!    Additional Information  If you have questions, please e-mail [email or web address] or call [phone] to talk to our [facility] staff. Remember, [facility] is NOT to be used for urgent needs. For medical emergencies, dial 911.          [facility] complies with applicable Federal civil rights laws and does not discriminate on the basis of race, color, national origin, age, disability, or sex.

## 2017-04-06 NOTE — ASSESSMENT & PLAN NOTE
Uncontrolled.  Chronic problem. Will continue chronic medications and monitor for any changes, adjusting as needed.  Use PO Clonidine prn SBP > 160 mmHg.

## 2017-04-06 NOTE — PLAN OF CARE
Problem: Patient Care Overview  Goal: Plan of Care Review  Outcome: Ongoing (interventions implemented as appropriate)  Irish speaking pt. Family at bedside to translate. Continent of b&b, ambulates to bathroom. IV abt therapy started, remains afebrile. C/o non productive cough. Received neb tx while awake. Denies any pain. Remains free from falls. Bed in low position, call light within reach. Family remains at bedside. Family and pt verbalized understanding of plan of care.

## 2017-04-06 NOTE — PROGRESS NOTES
Pt admitted to room 307a. Daughter at beside (Michelle). Pt is spainish speaking and daughter is able to translate for pt. Denies any pain. Phone call placed to , awaiting call back. VS:     04/06/17 1230   Vital Signs   Temp 98 °F (36.7 °C)   Temp src Oral   Pulse 80   Heart Rate Source Monitor   Resp 18   SpO2 98 %   O2 Device (Oxygen Therapy) room air   BP (!) 196/81   BP Location Left arm   BP Method Automatic   Patient Position Lying   Assessments (Pre/Post)   Level of Consciousness (AVPU) alert

## 2017-04-06 NOTE — ASSESSMENT & PLAN NOTE
Check blood glucose level q AC/HS.  Use Novolog Insulin Sliding Scale as needed.   Continue American Diabetic Association 1800 Kcal diet.

## 2017-04-06 NOTE — H&P
PCP: Mervin Killian MD    History & Physical    Chief Complaint: Worsening SOB    History of Present Illness:  Patient is a 82 y.o. female admitted to Hospitalist Service from Dr. Killian's office with complaint of worsening SOB for few days. Patient is  female and unable to speak English. Patient's daughter at bedside provided history and interpreted. Patient reportedly has past medical history significant for COPD, GERD, HTN and DM-2. Reportedly, patient is having progressively worsening SOB. Patient reported mostly dry cough and wheezing. Patient denied prior smoking history. Patient also complaining of sinus congestion. No fever, chills, sick contact or travel history reported. Patient took unspecified antibiotic along with PO Prednisone last week without much improvement. Patient denied chest pain, abdominal pain, nausea, vomiting, headache, vision changes, focal neuro-deficits, cough or fever.    Past Medical History:   Diagnosis Date    Diabetes mellitus type II     GERD (gastroesophageal reflux disease)     Hypertension      Past Surgical History:   Procedure Laterality Date    CHOLECYSTECTOMY       No family history on file.  Social History   Substance Use Topics    Smoking status: Never Smoker    Smokeless tobacco: Not on file    Alcohol use No      Review of patient's allergies indicates:   Allergen Reactions    Pcn [penicillins] Anaphylaxis     PTA Medications   Medication Sig    metoprolol tartrate (LOPRESSOR) 25 MG tablet Take 25 mg by mouth 2 (two) times daily.    ALENDRONATE SODIUM (FOSAMAX ORAL) Take 1 tablet by mouth once a week.    ciclopirox (PENLAC) 8 % Soln Apply topically nightly.    cloNIDine (CATAPRES) 0.1 MG tablet     glimepiride (AMARYL) 1 MG tablet Take 1 mg by mouth before breakfast.    losartan (COZAAR) 50 MG tablet     meclizine (ANTIVERT) 12.5 mg tablet     NEXIUM 40 mg capsule     ramipril (ALTACE) 2.5 MG capsule Take 2.5 mg by mouth once daily.     salicylic acid (SALACYN) 6 % Crea Apply 1 application topically 2 (two) times daily.    SYMBICORT 160-4.5 mcg/actuation HFAA INL 2 PFS PO BID    triamterene-hydrochlorothiazide 37.5-25 mg (DYAZIDE) 37.5-25 mg per capsule     VOLTAREN 1 % Gel      Review of Systems:  Constitutional: no fever or chills  Eyes: no visual changes  Ears, nose, mouth, throat, and face: + sinus congestion  Respiratory: see HPI  Cardiovascular: no chest pain or palpitations  Gastrointestinal: no nausea or vomiting, no abdominal pain or change in bowel habits  Genitourinary: no hematuria or dysuria  Integument/breast: no rash or pruritis  Hematologic/lymphatic: no easy bruising or lymphadenopathy  Musculoskeletal: no arthralgias or myalgias  Neurological: no seizures or tremors.  Behavioral/Psych: no auditory or visual hallucinations  Endocrine: no heat or cold intolerance     OBJECTIVE:     Vital Signs (Most Recent)  Temp: 98 °F (36.7 °C) (04/06/17 1230)  Pulse: 80 (04/06/17 1230)  Resp: 18 (04/06/17 1230)  BP: (!) 196/81 (04/06/17 1230)  SpO2: 98 % (04/06/17 1230)    Physical Exam:  General appearance: well developed, appears stated age  Head: normocephalic, atraumatic  Eyes:  conjunctivae/corneas clear. PERRL.  Nose: Nares normal. Septum midline.  Throat: lips, mucosa, and tongue normal; teeth and gums normal, no throat erythema.  Neck: supple, symmetrical, trachea midline, no JVD and thyroid not enlarged, symmetric, no tenderness/mass/nodules  Lungs:  Bilateral expiratory rhonchi with prolonged expiration  Chest wall: no tenderness  Heart: regular rate and rhythm, S1, S2 normal, no murmur, click, rub or gallop  Abdomen: soft, non-tender non-distented; bowel sounds normal; no masses,  no organomegaly  Extremities: no cyanosis, clubbing or edema.   Pulses: 2+ and symmetric  Skin: Skin color, texture, turgor normal. No rashes or lesions.  Lymph nodes: Cervical, supraclavicular, and axillary nodes normal.  Neurologic: Grossly  non-focal.    Laboratory:   CBC:   Recent Labs  Lab 04/06/17 1447   WBC 8.50   RBC 5.23   HGB 12.5   HCT 39.1      MCV 75*   MCH 23.8*   MCHC 31.9*     CMP:   Recent Labs  Lab 04/06/17 1447   GLU 89   CALCIUM 8.8      K 3.7   CO2 28      BUN 14   CREATININE 1.1     Coagulation:   Recent Labs  Lab 04/06/17 1447   INR 1.0   APTT 24.6     Microbiology Results (last 7 days)     Procedure Component Value Units Date/Time    Blood culture (site 1) [[id number]] Collected:  04/06/17 1447    Order Status:  Sent Specimen:  Blood Updated:  04/06/17 1447    Narrative:       Site # 1, aerobic and anaerobic (central line, if patient has  one)    Blood culture (site 2) [[id number]] Collected:  04/06/17 1447    Order Status:  Sent Specimen:  Blood Updated:  04/06/17 1447    Narrative:       Site # 2, aerobic only    Culture, Respiratory with Gram Stain [[id number]]     Order Status:  No result Specimen:  Respiratory         No results found for: HGBA1C  Microbiology Results (last 7 days)     ** No results found for the last 168 hours. **        Diagnostic Results:  Chest X-Ray: Possible bronchitis otherwise negative chest  Assessment/Plan:     * COPD exacerbation  Supplemental O2 via nasal canula; titrate O2 saturation to >92%.   Start Solumedrol 62.5 mg IV q 12 hrs.   Continue beta 2 agonist bronchodilator treatments.   Continue IV antibiotics.  Check sputum GS and Cx.   Continue routine medications as before.     Hypertension  Uncontrolled.  Chronic problem. Will continue chronic medications and monitor for any changes, adjusting as needed.  Use PO Clonidine prn SBP > 160 mmHg.    GERD (gastroesophageal reflux disease)  Continue PPI    Diabetes mellitus type II  Check blood glucose level q AC/HS.  Use Novolog Insulin Sliding Scale as needed.   Continue American Diabetic Association 1800 Kcal diet.    Discussed with Dr. Killian and patient's daughter in detail.   VTE Risk Mitigation         Ordered     enoxaparin  injection 40 mg  Daily     Route:  Subcutaneous        04/06/17 1414     Medium Risk of VTE  Once      04/06/17 1414        Kathy Felipe MD  Department of Hospital Medicine   [facility]

## 2017-04-07 LAB
ANION GAP SERPL CALC-SCNC: 10 MMOL/L
BASOPHILS # BLD AUTO: 0 K/UL
BASOPHILS NFR BLD: 0.1 %
BUN SERPL-MCNC: 14 MG/DL
CALCIUM SERPL-MCNC: 8.4 MG/DL
CHLORIDE SERPL-SCNC: 107 MMOL/L
CO2 SERPL-SCNC: 23 MMOL/L
CREAT SERPL-MCNC: 1.2 MG/DL
DIFFERENTIAL METHOD: ABNORMAL
EOSINOPHIL # BLD AUTO: 0 K/UL
EOSINOPHIL NFR BLD: 0.1 %
ERYTHROCYTE [DISTWIDTH] IN BLOOD BY AUTOMATED COUNT: 15.8 %
EST. GFR  (AFRICAN AMERICAN): 49 ML/MIN/1.73 M^2
EST. GFR  (NON AFRICAN AMERICAN): 42 ML/MIN/1.73 M^2
ESTIMATED AVG GLUCOSE: 123 MG/DL
GLUCOSE SERPL-MCNC: 181 MG/DL
HBA1C MFR BLD HPLC: 5.9 %
HCT VFR BLD AUTO: 36.8 %
HGB BLD-MCNC: 11.9 G/DL
LYMPHOCYTES # BLD AUTO: 0.9 K/UL
LYMPHOCYTES NFR BLD: 14.2 %
MCH RBC QN AUTO: 23.9 PG
MCHC RBC AUTO-ENTMCNC: 32.2 %
MCV RBC AUTO: 74 FL
MONOCYTES # BLD AUTO: 0.2 K/UL
MONOCYTES NFR BLD: 3.7 %
NEUTROPHILS # BLD AUTO: 5.2 K/UL
NEUTROPHILS NFR BLD: 81.9 %
PLATELET # BLD AUTO: 284 K/UL
PMV BLD AUTO: 8.4 FL
POCT GLUCOSE: 164 MG/DL (ref 70–110)
POCT GLUCOSE: 189 MG/DL (ref 70–110)
POCT GLUCOSE: 193 MG/DL (ref 70–110)
POCT GLUCOSE: 199 MG/DL (ref 70–110)
POCT GLUCOSE: 214 MG/DL (ref 70–110)
POTASSIUM SERPL-SCNC: 4 MMOL/L
RBC # BLD AUTO: 4.96 M/UL
SODIUM SERPL-SCNC: 140 MMOL/L
WBC # BLD AUTO: 6.4 K/UL

## 2017-04-07 PROCEDURE — 99225 PR SUBSEQUENT OBSERVATION CARE,LEVEL II: CPT | Mod: ,,, | Performed by: INTERNAL MEDICINE

## 2017-04-07 PROCEDURE — 94640 AIRWAY INHALATION TREATMENT: CPT

## 2017-04-07 PROCEDURE — 36415 COLL VENOUS BLD VENIPUNCTURE: CPT

## 2017-04-07 PROCEDURE — 94640 AIRWAY INHALATION TREATMENT: CPT | Mod: 76

## 2017-04-07 PROCEDURE — 85025 COMPLETE CBC W/AUTO DIFF WBC: CPT

## 2017-04-07 PROCEDURE — 80048 BASIC METABOLIC PNL TOTAL CA: CPT

## 2017-04-07 PROCEDURE — 63600175 PHARM REV CODE 636 W HCPCS: Performed by: INTERNAL MEDICINE

## 2017-04-07 PROCEDURE — 25000242 PHARM REV CODE 250 ALT 637 W/ HCPCS: Performed by: INTERNAL MEDICINE

## 2017-04-07 PROCEDURE — 94761 N-INVAS EAR/PLS OXIMETRY MLT: CPT

## 2017-04-07 PROCEDURE — G0378 HOSPITAL OBSERVATION PER HR: HCPCS

## 2017-04-07 PROCEDURE — 25000003 PHARM REV CODE 250: Performed by: INTERNAL MEDICINE

## 2017-04-07 RX ORDER — HYDROCODONE POLISTIREX AND CHLORPHENIRAMINE POLISTIREX 10; 8 MG/5ML; MG/5ML
5 SUSPENSION, EXTENDED RELEASE ORAL EVERY 12 HOURS PRN
Status: DISCONTINUED | OUTPATIENT
Start: 2017-04-07 | End: 2017-04-09 | Stop reason: HOSPADM

## 2017-04-07 RX ORDER — TRIAMTERENE/HYDROCHLOROTHIAZID 37.5-25 MG
1 TABLET ORAL DAILY
Status: DISCONTINUED | OUTPATIENT
Start: 2017-04-07 | End: 2017-04-09 | Stop reason: HOSPADM

## 2017-04-07 RX ADMIN — IPRATROPIUM BROMIDE AND ALBUTEROL SULFATE 3 ML: .5; 3 SOLUTION RESPIRATORY (INHALATION) at 07:04

## 2017-04-07 RX ADMIN — METOPROLOL TARTRATE 25 MG: 25 TABLET ORAL at 09:04

## 2017-04-07 RX ADMIN — LOSARTAN POTASSIUM 50 MG: 25 TABLET, FILM COATED ORAL at 09:04

## 2017-04-07 RX ADMIN — IPRATROPIUM BROMIDE AND ALBUTEROL SULFATE 3 ML: .5; 3 SOLUTION RESPIRATORY (INHALATION) at 03:04

## 2017-04-07 RX ADMIN — PANTOPRAZOLE SODIUM 40 MG: 40 TABLET, DELAYED RELEASE ORAL at 09:04

## 2017-04-07 RX ADMIN — METHYLPREDNISOLONE SODIUM SUCCINATE 62.5 MG: 125 INJECTION, POWDER, FOR SOLUTION INTRAMUSCULAR; INTRAVENOUS at 09:04

## 2017-04-07 RX ADMIN — IPRATROPIUM BROMIDE AND ALBUTEROL SULFATE 3 ML: .5; 3 SOLUTION RESPIRATORY (INHALATION) at 12:04

## 2017-04-07 RX ADMIN — MOXIFLOXACIN HYDROCHLORIDE 400 MG: 400 INJECTION, SOLUTION INTRAVENOUS at 03:04

## 2017-04-07 RX ADMIN — CLONIDINE HYDROCHLORIDE 0.1 MG: 0.1 TABLET ORAL at 03:04

## 2017-04-07 RX ADMIN — TRIAMTERENE AND HYDROCHLOROTHIAZIDE 1 TABLET: 37.5; 25 TABLET ORAL at 09:04

## 2017-04-07 RX ADMIN — ENOXAPARIN SODIUM 40 MG: 100 INJECTION SUBCUTANEOUS at 05:04

## 2017-04-07 RX ADMIN — GLIMEPIRIDE 1 MG: 1 TABLET ORAL at 06:04

## 2017-04-07 RX ADMIN — INSULIN ASPART 2 UNITS: 100 INJECTION, SOLUTION INTRAVENOUS; SUBCUTANEOUS at 05:04

## 2017-04-07 RX ADMIN — FLUTICASONE FUROATE AND VILANTEROL TRIFENATATE 1 PUFF: 100; 25 POWDER RESPIRATORY (INHALATION) at 07:04

## 2017-04-07 RX ADMIN — IPRATROPIUM BROMIDE AND ALBUTEROL SULFATE 3 ML: .5; 3 SOLUTION RESPIRATORY (INHALATION) at 08:04

## 2017-04-07 NOTE — PROGRESS NOTES
Progress Note  Hospital Medicine  Patient Name:Jeanette Evans  MRN:  [id number]  Patient Class: OP- Observation  Admit Date: 4/6/2017  Length of Stay: 0 days  Expected Discharge Date:   Attending Physician: Kathy Felipe MD  Primary Care Provider:  Mervin Killian MD    SUBJECTIVE:     Principal Problem: COPD exacerbation  Initial history of present illness: Patient is a 82 y.o. female admitted to Hospitalist Service from Dr. Killian's office with complaint of worsening SOB for few days. Patient is  female and unable to speak English. Patient's daughter at bedside provided history and interpreted. Patient reportedly has past medical history significant for COPD, GERD, HTN and DM-2. Reportedly, patient is having progressively worsening SOB. Patient reported mostly dry cough and wheezing. Patient denied prior smoking history. Patient also complaining of sinus congestion. No fever, chills, sick contact or travel history reported. Patient took unspecified antibiotic along with PO Prednisone last week without much improvement. Patient denied chest pain, abdominal pain, nausea, vomiting, headache, vision changes, focal neuro-deficits, cough or fever.    PMH/PSH/SH/FH/Meds: reviewed.    Symptoms/Review of Systems: Feeling better, less SOB but cough is pretty severe. No chest pain or headache, fever or abdominal pain.     Diet:  Adequate intake.    Activity level: Normal.    Pain:  Patient reports no pain.       OBJECTIVE:   Vital Signs (Most Recent):      Temp: 98 °F (36.7 °C) (04/07/17 0800)  Pulse: 99 (04/07/17 0800)  Resp: 18 (04/07/17 0800)  BP: (!) 153/63 (04/07/17 0800)  SpO2: 97 % (04/07/17 0800)       Vital Signs Range (Last 24H):  Temp:  [97.8 °F (36.6 °C)-98.4 °F (36.9 °C)]   Pulse:  []   Resp:  [16-19]   BP: (130-196)/(63-81)   SpO2:  [96 %-98 %]     Weight: 72.1 kg (159 lb)  Body mass index is 35.65 kg/(m^2).    Intake/Output Summary (Last 24 hours) at 04/07/17 1012  Last data filed at 04/07/17  0600   Gross per 24 hour   Intake              610 ml   Output                0 ml   Net              610 ml     Physical Examination:  General appearance: well developed, appears stated age, frequent coughing bouts  Head: normocephalic, atraumatic  Eyes: conjunctivae/corneas clear. PERRL.  Nose: Nares normal. Septum midline.  Throat: lips, mucosa, and tongue normal; teeth and gums normal, no throat erythema.  Neck: supple, symmetrical, trachea midline, no JVD and thyroid not enlarged, symmetric, no tenderness/mass/nodules  Lungs: Bilateral expiratory rhonchi with prolonged expiration  Chest wall: no tenderness  Heart: regular rate and rhythm, S1, S2 normal, no murmur, click, rub or gallop  Abdomen: soft, non-tender non-distented; bowel sounds normal; no masses, no organomegaly  Extremities: no cyanosis, clubbing or edema.   Pulses: 2+ and symmetric  Skin: Skin color, texture, turgor normal. No rashes or lesions.  Lymph nodes: Cervical, supraclavicular, and axillary nodes normal.  Neurologic: Grossly non-focal.    CBC:    Recent Labs  Lab 04/06/17 1447 04/07/17 0449   WBC 8.50 6.40   RBC 5.23 4.96   HGB 12.5 11.9*   HCT 39.1 36.8*    284   MCV 75* 74*   MCH 23.8* 23.9*   MCHC 31.9* 32.2   BMP    Recent Labs  Lab 04/06/17 1447 04/07/17 0449   GLU 89 181*    140   K 3.7 4.0    107   CO2 28 23   BUN 14 14   CREATININE 1.1 1.2   CALCIUM 8.8 8.4*      Diagnostic Results:  Microbiology Results (last 7 days)     Procedure Component Value Units Date/Time    Blood culture (site 1) [[id number]] Collected:  04/06/17 1447    Order Status:  Completed Specimen:  Blood Updated:  04/07/17 0315     Blood Culture, Routine No Growth to date    Narrative:       Site # 1, aerobic and anaerobic (central line, if patient has  one)    Blood culture (site 2) [[id number]] Collected:  04/06/17 1447    Order Status:  Completed Specimen:  Blood Updated:  04/07/17 0315     Blood Culture, Routine No Growth to date     Narrative:       Site # 2, aerobic only    Culture, Respiratory with Gram Stain [[id number]]     Order Status:  No result Specimen:  Respiratory       CXR: Possible bronchitis otherwise negative chest  Assessment/Plan:   * COPD exacerbation  Supplemental O2 via nasal canula; titrate O2 saturation to >92%.   Continue Solumedrol 62.5 mg IV q 12 hrs.   Continue beta 2 agonist bronchodilator treatments.   Continue IV antibiotics.  Check sputum GS and Cx.   Continue routine medications as before.   Add Tussionex.     Hypertension  Improved control.  Chronic problem. Will continue chronic medications and monitor for any changes, adjusting as needed.  Use PO Clonidine prn SBP > 160 mmHg.     GERD (gastroesophageal reflux disease)  Continue PPI     Diabetes mellitus type II  Check blood glucose level q AC/HS.  Use Novolog Insulin Sliding Scale as needed.   Continue American Diabetic Association 1800 Kcal diet.    Discussed with patient's multiple family members. Hopefully DC home in AM.   VTE Risk Mitigation         Ordered     enoxaparin injection 40 mg  Daily     Route:  Subcutaneous        04/06/17 1414     Medium Risk of VTE  Once      04/06/17 1414        Kathy Felipe MD  Department of Hospital Medicine   [facility]

## 2017-04-07 NOTE — PLAN OF CARE
04/07/17 0742   Patient Assessment/Suction   Level of Consciousness (AVPU) alert   Respiratory Effort Normal;Unlabored   All Lung Fields Breath Sounds coarse   Cough Frequency infrequent   Cough Type good;nonproductive   PRE-TX-O2-ETCO2   O2 Device (Oxygen Therapy) room air   SpO2 98 %   Pulse Oximetry Type Intermittent   $ Pulse Oximetry - Multiple Charge Pulse Oximetry - Multiple   Pulse 75   Resp 16   Positioning Sitting in bed   Aerosol Therapy   $ Aerosol Therapy Charges Aerosol Treatment   Respiratory Treatment Status given   SVN/Inhaler Treatment Route mask   Position During Treatment Sitting in bed   Patient Tolerance good   Inhaler   $ Inhaler Charges MDI (Metered Dose Inahler) Treatment   Respiratory Treatment Status given   SVN/Inhaler Treatment Route mouthpiece   Patient Tolerance good   Post-Treatment   Post-treatment Heart Rate (beats/min) 88   Post-treatment Resp Rate (breaths/min) 16   All Fields Breath Sounds aeration increased

## 2017-04-07 NOTE — PLAN OF CARE
Problem: Patient Care Overview  Goal: Plan of Care Review  Outcome: Ongoing (interventions implemented as appropriate)  Armenian speaking pt. Family at bedside to translate. Alert. Continent of b&b, ambulates to bathroom. IV abt therapy started, remains afebrile. C/o non productive cough. Received neb tx while awake. Denies any pain. Remains free from falls. Bed in low position, brakes locked, SR upx2, call light within reach. Family remains at bedside. Family and pt verbalized understanding of plan of care. Open communication utilized.

## 2017-04-07 NOTE — PLAN OF CARE
I completed the assessment with the pts daughter, Michelle Ashby [phone]. She was able to verify all info on the face sheet as correct. The pts lives at home with her daughter. She has not been to a hospital in several years. She reports independence in ADL's and does not have any HH services. She uses [facility] on [address] She sees Dr. Killian and has [employer] insurance. I introduced them to the [employer] folder and she verbalized and understanding . I wrote my name and phone number on the pts white board. Olamide Curtis, [facility]     04/07/17 1213   Discharge Assessment   Assessment Type Discharge Planning Assessment   Confirmed/corrected address and phone number on facesheet? Yes   Assessment information obtained from? Patient   Communicated expected length of stay with patient/caregiver no   Type of Healthcare Directive Received Durable power of  for health care  (Michelle Barakat [phone])   If Healthcare Directive is received, is it scanned into Epic? no (comment)   Prior to hospitilization cognitive status: Alert/Oriented   Prior to hospitalization functional status: Independent   Current cognitive status: Alert/Oriented   Current Functional Status: Independent   Arrived From home or self-care   Lives With child(paris), adult   Able to Return to Prior Arrangements yes   Is patient able to care for self after discharge? Yes   How many people do you have in your home that can help with your care after discharge? 1   Who are your caregiver(s) and their phone number(s)? Michelle [phone] and other family members    Readmission Within The Last 30 Days no previous admission in last 30 days   Patient currently being followed by outpatient case management? No   Patient currently receives home health services? No   Does the patient currently use HME? No   Patient currently receives private duty nursing? No   Patient currently receives any other outside agency services? No   Equipment Currently Used at Home  none   Do you have any problems affording any of your prescribed medications? No  ([facility] [address] )   Is the patient taking medications as prescribed? yes   Do you have any financial concerns preventing you from receiving the healthcare you need? No  ([employer] )   Does the patient have transportation to healthcare appointments? Yes   Transportation Available family or friend will provide   On Dialysis? No   Does the patient receive services at the Coumadin Clinic? No   Are there any open cases? No   Discharge Plan A Home   Discharge Plan B Home with family   Patient/Family In Agreement With Plan yes

## 2017-04-07 NOTE — PROGRESS NOTES
Pt in Bed 321, moved from 307A. 96% sats on room air. Q4 w/a Duoneb aero tx given and tolerated well.

## 2017-04-08 LAB
ANION GAP SERPL CALC-SCNC: 11 MMOL/L
BASOPHILS # BLD AUTO: 0 K/UL
BASOPHILS NFR BLD: 0 %
BUN SERPL-MCNC: 21 MG/DL
CALCIUM SERPL-MCNC: 8.4 MG/DL
CHLORIDE SERPL-SCNC: 106 MMOL/L
CO2 SERPL-SCNC: 22 MMOL/L
CREAT SERPL-MCNC: 1.2 MG/DL
DIFFERENTIAL METHOD: ABNORMAL
EOSINOPHIL # BLD AUTO: 0 K/UL
EOSINOPHIL NFR BLD: 0 %
ERYTHROCYTE [DISTWIDTH] IN BLOOD BY AUTOMATED COUNT: 16.4 %
EST. GFR  (AFRICAN AMERICAN): 49 ML/MIN/1.73 M^2
EST. GFR  (NON AFRICAN AMERICAN): 42 ML/MIN/1.73 M^2
GLUCOSE SERPL-MCNC: 179 MG/DL
HCT VFR BLD AUTO: 38.5 %
HGB BLD-MCNC: 12 G/DL
LYMPHOCYTES # BLD AUTO: 1.2 K/UL
LYMPHOCYTES NFR BLD: 8.9 %
MCH RBC QN AUTO: 23.4 PG
MCHC RBC AUTO-ENTMCNC: 31.2 %
MCV RBC AUTO: 75 FL
MONOCYTES # BLD AUTO: 0.5 K/UL
MONOCYTES NFR BLD: 4 %
NEUTROPHILS # BLD AUTO: 11.7 K/UL
NEUTROPHILS NFR BLD: 87.1 %
PLATELET # BLD AUTO: 295 K/UL
PMV BLD AUTO: 8.4 FL
POCT GLUCOSE: 134 MG/DL (ref 70–110)
POCT GLUCOSE: 143 MG/DL (ref 70–110)
POTASSIUM SERPL-SCNC: 3.8 MMOL/L
RBC # BLD AUTO: 5.14 M/UL
SODIUM SERPL-SCNC: 139 MMOL/L
WBC # BLD AUTO: 13.5 K/UL

## 2017-04-08 PROCEDURE — 25000003 PHARM REV CODE 250: Performed by: FAMILY MEDICINE

## 2017-04-08 PROCEDURE — 25000003 PHARM REV CODE 250: Performed by: INTERNAL MEDICINE

## 2017-04-08 PROCEDURE — 25000242 PHARM REV CODE 250 ALT 637 W/ HCPCS: Performed by: INTERNAL MEDICINE

## 2017-04-08 PROCEDURE — 85025 COMPLETE CBC W/AUTO DIFF WBC: CPT

## 2017-04-08 PROCEDURE — 80048 BASIC METABOLIC PNL TOTAL CA: CPT

## 2017-04-08 PROCEDURE — 63600175 PHARM REV CODE 636 W HCPCS: Performed by: FAMILY MEDICINE

## 2017-04-08 PROCEDURE — 87205 SMEAR GRAM STAIN: CPT

## 2017-04-08 PROCEDURE — 36415 COLL VENOUS BLD VENIPUNCTURE: CPT

## 2017-04-08 PROCEDURE — 94664 DEMO&/EVAL PT USE INHALER: CPT | Mod: 59

## 2017-04-08 PROCEDURE — 94667 MNPJ CHEST WALL 1ST: CPT

## 2017-04-08 PROCEDURE — 87070 CULTURE OTHR SPECIMN AEROBIC: CPT

## 2017-04-08 PROCEDURE — G0378 HOSPITAL OBSERVATION PER HR: HCPCS

## 2017-04-08 PROCEDURE — 94640 AIRWAY INHALATION TREATMENT: CPT

## 2017-04-08 PROCEDURE — 63600175 PHARM REV CODE 636 W HCPCS: Performed by: INTERNAL MEDICINE

## 2017-04-08 RX ORDER — LEVALBUTEROL INHALATION SOLUTION 1.25 MG/3ML
1.25 SOLUTION RESPIRATORY (INHALATION) EVERY 6 HOURS
Status: DISCONTINUED | OUTPATIENT
Start: 2017-04-08 | End: 2017-04-08 | Stop reason: SDUPTHER

## 2017-04-08 RX ORDER — DEXAMETHASONE SODIUM PHOSPHATE 4 MG/ML
4 INJECTION, SOLUTION INTRA-ARTICULAR; INTRALESIONAL; INTRAMUSCULAR; INTRAVENOUS; SOFT TISSUE ONCE
Status: COMPLETED | OUTPATIENT
Start: 2017-04-08 | End: 2017-04-08

## 2017-04-08 RX ORDER — LEVALBUTEROL 1.25 MG/.5ML
1.25 SOLUTION, CONCENTRATE RESPIRATORY (INHALATION)
Status: DISCONTINUED | OUTPATIENT
Start: 2017-04-08 | End: 2017-04-08

## 2017-04-08 RX ORDER — MAGNESIUM SULFATE HEPTAHYDRATE 40 MG/ML
2 INJECTION, SOLUTION INTRAVENOUS ONCE
Status: COMPLETED | OUTPATIENT
Start: 2017-04-08 | End: 2017-04-08

## 2017-04-08 RX ORDER — LEVALBUTEROL 1.25 MG/.5ML
1.25 SOLUTION, CONCENTRATE RESPIRATORY (INHALATION) EVERY 4 HOURS
Status: DISCONTINUED | OUTPATIENT
Start: 2017-04-08 | End: 2017-04-09 | Stop reason: HOSPADM

## 2017-04-08 RX ORDER — LEVALBUTEROL 1.25 MG/.5ML
1.25 SOLUTION, CONCENTRATE RESPIRATORY (INHALATION) EVERY 6 HOURS
Status: DISCONTINUED | OUTPATIENT
Start: 2017-04-08 | End: 2017-04-08

## 2017-04-08 RX ORDER — MAGNESIUM SULFATE/D5W 2 G/50 ML
2 INTRAVENOUS SOLUTION, PIGGYBACK (ML) INTRAVENOUS ONCE
Status: DISCONTINUED | OUTPATIENT
Start: 2017-04-08 | End: 2017-04-08 | Stop reason: SDUPTHER

## 2017-04-08 RX ORDER — LEVALBUTEROL 1.25 MG/.5ML
1.25 SOLUTION, CONCENTRATE RESPIRATORY (INHALATION) EVERY 4 HOURS
Status: DISCONTINUED | OUTPATIENT
Start: 2017-04-08 | End: 2017-04-09

## 2017-04-08 RX ADMIN — MOXIFLOXACIN HYDROCHLORIDE 400 MG: 400 INJECTION, SOLUTION INTRAVENOUS at 03:04

## 2017-04-08 RX ADMIN — LOSARTAN POTASSIUM 50 MG: 25 TABLET, FILM COATED ORAL at 10:04

## 2017-04-08 RX ADMIN — PANTOPRAZOLE SODIUM 40 MG: 40 TABLET, DELAYED RELEASE ORAL at 10:04

## 2017-04-08 RX ADMIN — LEVALBUTEROL 1.25 MG: 1.25 SOLUTION, CONCENTRATE RESPIRATORY (INHALATION) at 01:04

## 2017-04-08 RX ADMIN — METOPROLOL TARTRATE 25 MG: 25 TABLET ORAL at 10:04

## 2017-04-08 RX ADMIN — IPRATROPIUM BROMIDE AND ALBUTEROL SULFATE 3 ML: .5; 3 SOLUTION RESPIRATORY (INHALATION) at 07:04

## 2017-04-08 RX ADMIN — TRIAMTERENE AND HYDROCHLOROTHIAZIDE 1 TABLET: 37.5; 25 TABLET ORAL at 10:04

## 2017-04-08 RX ADMIN — METOPROLOL TARTRATE 25 MG: 25 TABLET ORAL at 08:04

## 2017-04-08 RX ADMIN — LEVALBUTEROL 1.25 MG: 1.25 SOLUTION, CONCENTRATE RESPIRATORY (INHALATION) at 07:04

## 2017-04-08 RX ADMIN — DEXAMETHASONE SODIUM PHOSPHATE 4 MG: 4 INJECTION, SOLUTION INTRAMUSCULAR; INTRAVENOUS at 02:04

## 2017-04-08 RX ADMIN — MAGNESIUM SULFATE IN WATER 2 G: 40 INJECTION, SOLUTION INTRAVENOUS at 02:04

## 2017-04-08 RX ADMIN — FLUTICASONE FUROATE AND VILANTEROL TRIFENATATE 1 PUFF: 100; 25 POWDER RESPIRATORY (INHALATION) at 07:04

## 2017-04-08 RX ADMIN — GLIMEPIRIDE 1 MG: 1 TABLET ORAL at 05:04

## 2017-04-08 RX ADMIN — ENOXAPARIN SODIUM 40 MG: 100 INJECTION SUBCUTANEOUS at 05:04

## 2017-04-08 RX ADMIN — METHYLPREDNISOLONE SODIUM SUCCINATE 62.5 MG: 125 INJECTION, POWDER, FOR SOLUTION INTRAMUSCULAR; INTRAVENOUS at 10:04

## 2017-04-08 NOTE — PROGRESS NOTES
Progress Note    Admit Date: 4/6/2017   LOS: 0 days     SUBJECTIVE:     Principal Problem: COPD exacerbation  Initial history of present illness: Patient is a 82 y.o. female admitted to Hospitalist Service from Dr. Killian's office with complaint of worsening SOB for few days. Patient is  female and unable to speak English. Patient's daughter at bedside provided history and interpreted. Patient reportedly has past medical history significant for COPD, GERD, HTN and DM-2. Reportedly, patient is having progressively worsening SOB. Patient reported mostly dry cough and wheezing. Patient denied prior smoking history. Patient also complaining of sinus congestion. No fever, chills, sick contact or travel history reported. Patient took unspecified antibiotic along with PO Prednisone last week without much improvement. Patient denied chest pain, abdominal pain, nausea, vomiting, headache, vision changes, focal neuro-deficits, cough or fever.     Interval history  Patient seen and examined this morning. No acute events overnight. Patient continues to complain of shortness of breath and wheezing. Patient did not tolerate albuterol well secondary to increase HR. No further complaints.    PMH/PSH/SH/FH/Meds: reviewed.     Symptoms/Review of Systems: + shortness of breath + cough   Diet: Adequate intake.   Activity level: Normal.   Pain: Patient reports no pain.    Scheduled Meds:   dexamethasone  4 mg Intravenous Once    enoxaparin  40 mg Subcutaneous Daily    fluticasone-vilanterol  1 puff Inhalation Daily    glimepiride  1 mg Oral Before breakfast    levalbuterol  1.25 mg Nebulization Q6H    losartan  50 mg Oral Daily    magnesium sulfate IVPB  2 g Intravenous Once    metoprolol tartrate  25 mg Oral BID    moxifloxacin  400 mg Intravenous Q24H    pantoprazole  40 mg Oral Daily    triamterene-hydrochlorothiazide 37.5-25 mg  1 tablet Oral Daily     Continuous Infusions:   PRN Meds:acetaminophen, cloNIDine,  dextrose 50%, dextrose 50%, dextrose 50%, dextrose 50%, glucagon (human recombinant), glucagon (human recombinant), glucose, glucose, glucose, glucose, hydrocodone-acetaminophen 5-325mg, hydrocodone-chlorpheniramine, insulin aspart, pneumoc 13-ingrid conj-dip cr(PF)    Review of patient's allergies indicates:   Allergen Reactions    Pcn [penicillins] Anaphylaxis       OBJECTIVE:     Vital Signs (Most Recent)  Temp: 98 °F (36.7 °C) (04/08/17 1143)  Pulse: 76 (04/08/17 1143)  Resp: 18 (04/08/17 1143)  BP: 129/62 (04/08/17 1143)  SpO2: 95 % (04/08/17 1143)    Vital Signs Range (Last 24H):  Temp:  [97.8 °F (36.6 °C)-98.3 °F (36.8 °C)]   Pulse:  []   Resp:  [16-20]   BP: (129-199)/(61-82)   SpO2:  [93 %-97 %]     I & O (Last 24H):  Intake/Output Summary (Last 24 hours) at 04/08/17 1236  Last data filed at 04/08/17 0800   Gross per 24 hour   Intake             1090 ml   Output                0 ml   Net             1090 ml     Physical Exam:  General appearance: well developed, appears stated age, frequent coughing bouts  Head: normocephalic, atraumatic  Eyes: conjunctivae/corneas clear. PERRL.  Nose: Nares normal. Septum midline.  Throat: lips, mucosa, and tongue normal; teeth and gums normal, no throat erythema.  Neck: supple, symmetrical, trachea midline, no JVD and thyroid not enlarged, symmetric, no tenderness/mass/nodules  Lungs: Bilateral expiratory wheezing   Chest wall: no tenderness  Heart: regular rate and rhythm, S1, S2 normal, no murmur, click, rub or gallop  Abdomen: soft, non-tender non-distented; bowel sounds normal; no masses, no organomegaly  Extremities: no cyanosis, clubbing or edema.   Pulses: 2+ and symmetric  Skin: Skin color, texture, turgor normal. No rashes or lesions.  Lymph nodes: Cervical, supraclavicular, and axillary nodes normal.  Neurologic: Grossly non-focal.    Laboratory:  CBC:   Recent Labs  Lab 04/08/17  0505   WBC 13.50*   RBC 5.14   HGB 12.0   HCT 38.5      MCV 75*   MCH  23.4*   MCHC 31.2*     CMP:   Recent Labs  Lab 04/08/17  0505   *   CALCIUM 8.4*      K 3.8   CO2 22*      BUN 21   CREATININE 1.2       Diagnostic Results:  reviewed    ASSESSMENT/PLAN:     * COPD exacerbation  Supplemental O2 via nasal canula; titrate O2 saturation to >92%.   Decadron 4 mg x 1   Continue beta 2 agonist bronchodilator treatments.   Continue IV antibiotics.  Check sputum GS and Cx.   Continue routine medications as before.   Add Tussionex.      Hypertension  Improved control.  Chronic problem. Will continue chronic medications and monitor for any changes, adjusting as needed.  Use PO Clonidine prn SBP > 160 mmHg.      GERD (gastroesophageal reflux disease)  Continue PPI      Diabetes mellitus type II  Check blood glucose level q AC/HS.  Use Novolog Insulin Sliding Scale as needed.   Continue American Diabetic Association 1800 Kcal diet.     Discussed with patient's multiple family members. Hopefully DC home in AM.   VTE Risk Mitigation           Ordered       enoxaparin injection 40 mg Daily    Route: Subcutaneous          04/06/17 1414       Medium Risk of VTE Once      04/06/17 1414     Anton Rosas MD  [facility]  4/8/2017 12:39 PM

## 2017-04-08 NOTE — NURSING
Pt complains of anxiety and warmth rushing into her head. Pt pulse rate increased to 107 at this time post respiratory treatment. Daughter remains at bedside. Call light in easy reach.

## 2017-04-08 NOTE — PLAN OF CARE
Problem: Patient Care Overview  Goal: Plan of Care Review  Outcome: Ongoing (interventions implemented as appropriate)  Pt with non productive frequent cough noted this pm. Pt compliant with care and medications as ordered by MD. Pt family remains at pt bedside. Pt speaks Faroese but is able to make her needs known and follows instructions using chart and images. Pt became tachycardic this am after her nebulizer treatment and breo inhaler and was complaining of a warmth from her body to her head. Pt became anxious but calmed down after assessment and reassurance given to pt and pt daughter.  Nebulizer medication changed by MD. Pt family request pt does not get the breo medication again. Pt with improving appetite this pm. O2 sats maintained on room air. Call light in easy reach.

## 2017-04-08 NOTE — PLAN OF CARE
Problem: Patient Care Overview  Goal: Plan of Care Review  Outcome: Ongoing (interventions implemented as appropriate)  Aerosol txs Q4 hrs W/A with Breo QAM tolerates well.

## 2017-04-08 NOTE — PLAN OF CARE
Problem: Patient Care Overview  Goal: Plan of Care Review  Outcome: Ongoing (interventions implemented as appropriate)  Patient Turkish speaking. Family remains at bedside to translate, attentive to patient. Patient is AAOX4, able to verbalize needs and wants. Patient ambulates to bathroom with stand by assist. Patient denies any pain this shift. IV antibiotics infusing per order. Patient has remained free from fall/injury. Bed in lowest , locked position, needs attended to, call light kept within reach, will continue to monitor.

## 2017-04-09 VITALS
SYSTOLIC BLOOD PRESSURE: 125 MMHG | BODY MASS INDEX: 34.57 KG/M2 | DIASTOLIC BLOOD PRESSURE: 60 MMHG | RESPIRATION RATE: 18 BRPM | OXYGEN SATURATION: 96 % | HEIGHT: 56 IN | HEART RATE: 86 BPM | WEIGHT: 153.69 LBS | TEMPERATURE: 98 F

## 2017-04-09 LAB
ANION GAP SERPL CALC-SCNC: 12 MMOL/L
BASOPHILS # BLD AUTO: 0 K/UL
BASOPHILS NFR BLD: 0.1 %
BUN SERPL-MCNC: 33 MG/DL
CALCIUM SERPL-MCNC: 8.5 MG/DL
CHLORIDE SERPL-SCNC: 104 MMOL/L
CO2 SERPL-SCNC: 22 MMOL/L
CREAT SERPL-MCNC: 1.4 MG/DL
DIFFERENTIAL METHOD: ABNORMAL
EOSINOPHIL # BLD AUTO: 0 K/UL
EOSINOPHIL NFR BLD: 0 %
ERYTHROCYTE [DISTWIDTH] IN BLOOD BY AUTOMATED COUNT: 16.2 %
EST. GFR  (AFRICAN AMERICAN): 40 ML/MIN/1.73 M^2
EST. GFR  (NON AFRICAN AMERICAN): 35 ML/MIN/1.73 M^2
GLUCOSE SERPL-MCNC: 134 MG/DL
HCT VFR BLD AUTO: 40.5 %
HGB BLD-MCNC: 12.6 G/DL
LYMPHOCYTES # BLD AUTO: 2.1 K/UL
LYMPHOCYTES NFR BLD: 9.4 %
MCH RBC QN AUTO: 23.4 PG
MCHC RBC AUTO-ENTMCNC: 31.1 %
MCV RBC AUTO: 75 FL
MONOCYTES # BLD AUTO: 1.2 K/UL
MONOCYTES NFR BLD: 5.6 %
NEUTROPHILS # BLD AUTO: 18.6 K/UL
NEUTROPHILS NFR BLD: 84.9 %
PLATELET # BLD AUTO: 310 K/UL
PMV BLD AUTO: 9.2 FL
POCT GLUCOSE: 134 MG/DL (ref 70–110)
POCT GLUCOSE: 164 MG/DL (ref 70–110)
POCT GLUCOSE: 66 MG/DL (ref 70–110)
POTASSIUM SERPL-SCNC: 3.7 MMOL/L
RBC # BLD AUTO: 5.38 M/UL
SODIUM SERPL-SCNC: 138 MMOL/L
WBC # BLD AUTO: 22 K/UL

## 2017-04-09 PROCEDURE — G0009 ADMIN PNEUMOCOCCAL VACCINE: HCPCS | Performed by: INTERNAL MEDICINE

## 2017-04-09 PROCEDURE — 94668 MNPJ CHEST WALL SBSQ: CPT

## 2017-04-09 PROCEDURE — 94640 AIRWAY INHALATION TREATMENT: CPT

## 2017-04-09 PROCEDURE — 63600175 PHARM REV CODE 636 W HCPCS: Performed by: INTERNAL MEDICINE

## 2017-04-09 PROCEDURE — 85025 COMPLETE CBC W/AUTO DIFF WBC: CPT

## 2017-04-09 PROCEDURE — 90471 IMMUNIZATION ADMIN: CPT | Performed by: INTERNAL MEDICINE

## 2017-04-09 PROCEDURE — 80048 BASIC METABOLIC PNL TOTAL CA: CPT

## 2017-04-09 PROCEDURE — 36415 COLL VENOUS BLD VENIPUNCTURE: CPT

## 2017-04-09 PROCEDURE — 25000003 PHARM REV CODE 250: Performed by: INTERNAL MEDICINE

## 2017-04-09 PROCEDURE — 94664 DEMO&/EVAL PT USE INHALER: CPT | Mod: 59

## 2017-04-09 PROCEDURE — 90670 PCV13 VACCINE IM: CPT | Performed by: INTERNAL MEDICINE

## 2017-04-09 PROCEDURE — G0378 HOSPITAL OBSERVATION PER HR: HCPCS

## 2017-04-09 RX ORDER — PREDNISONE 50 MG/1
50 TABLET ORAL DAILY
Qty: 5 TABLET | Refills: 0 | Status: SHIPPED | OUTPATIENT
Start: 2017-04-09 | End: 2017-04-14

## 2017-04-09 RX ORDER — LEVALBUTEROL 1.25 MG/.5ML
1.25 SOLUTION, CONCENTRATE RESPIRATORY (INHALATION) EVERY 4 HOURS
Status: DISCONTINUED | OUTPATIENT
Start: 2017-04-09 | End: 2017-04-09

## 2017-04-09 RX ORDER — ALBUTEROL SULFATE 90 UG/1
2 AEROSOL, METERED RESPIRATORY (INHALATION) EVERY 6 HOURS PRN
Qty: 1 INHALER | Refills: 1 | Status: SHIPPED | OUTPATIENT
Start: 2017-04-09 | End: 2017-11-06 | Stop reason: SDUPTHER

## 2017-04-09 RX ORDER — MOXIFLOXACIN HYDROCHLORIDE 400 MG/1
400 TABLET ORAL DAILY
Qty: 10 TABLET | Refills: 0 | Status: SHIPPED | OUTPATIENT
Start: 2017-04-09 | End: 2017-12-19

## 2017-04-09 RX ADMIN — LOSARTAN POTASSIUM 50 MG: 25 TABLET, FILM COATED ORAL at 09:04

## 2017-04-09 RX ADMIN — LEVALBUTEROL 1.25 MG: 1.25 SOLUTION, CONCENTRATE RESPIRATORY (INHALATION) at 12:04

## 2017-04-09 RX ADMIN — PANTOPRAZOLE SODIUM 40 MG: 40 TABLET, DELAYED RELEASE ORAL at 09:04

## 2017-04-09 RX ADMIN — LEVALBUTEROL 1.25 MG: 1.25 SOLUTION, CONCENTRATE RESPIRATORY (INHALATION) at 04:04

## 2017-04-09 RX ADMIN — GLIMEPIRIDE 1 MG: 1 TABLET ORAL at 06:04

## 2017-04-09 RX ADMIN — METOPROLOL TARTRATE 25 MG: 25 TABLET ORAL at 09:04

## 2017-04-09 RX ADMIN — LEVALBUTEROL 1.25 MG: 1.25 SOLUTION, CONCENTRATE RESPIRATORY (INHALATION) at 11:04

## 2017-04-09 RX ADMIN — TRIAMTERENE AND HYDROCHLOROTHIAZIDE 1 TABLET: 37.5; 25 TABLET ORAL at 09:04

## 2017-04-09 RX ADMIN — PNEUMOCOCCAL 13-VALENT CONJUGATE VACCINE 0.5 ML: 2.2; 2.2; 2.2; 2.2; 2.2; 4.4; 2.2; 2.2; 2.2; 2.2; 2.2; 2.2; 2.2 INJECTION, SUSPENSION INTRAMUSCULAR at 01:04

## 2017-04-09 RX ADMIN — LEVALBUTEROL 1.25 MG: 1.25 SOLUTION, CONCENTRATE RESPIRATORY (INHALATION) at 07:04

## 2017-04-09 NOTE — NURSING
Discharge instructions reviewed with pt and pt daughter who verified that pt understood discharge instructions.

## 2017-04-09 NOTE — PROGRESS NOTES
Progress Note    Admit Date: 4/6/2017   LOS: 0 days     SUBJECTIVE:     Principal Problem: COPD exacerbation  Initial history of present illness: Patient is a 82 y.o. female admitted to Hospitalist Service from Dr. Killian's office with complaint of worsening SOB for few days. Patient is  female and unable to speak English. Patient's daughter at bedside provided history and interpreted. Patient reportedly has past medical history significant for COPD, GERD, HTN and DM-2. Reportedly, patient is having progressively worsening SOB. Patient reported mostly dry cough and wheezing. Patient denied prior smoking history. Patient also complaining of sinus congestion. No fever, chills, sick contact or travel history reported. Patient took unspecified antibiotic along with PO Prednisone last week without much improvement. Patient denied chest pain, abdominal pain, nausea, vomiting, headache, vision changes, focal neuro-deficits, cough or fever.     Interval history  Patient seen and examined this morning. No acute events overnight. Patient shortness of breath and wheezing have resolved. Patient requesting to go home.  PMH/PSH/SH/FH/Meds: reviewed.     Symptoms/Review of Systems: + shortness of breath + cough   Diet: Adequate intake.   Activity level: Normal.   Pain: Patient reports no pain.    Scheduled Meds:   enoxaparin  40 mg Subcutaneous Daily    fluticasone-vilanterol  1 puff Inhalation Daily    glimepiride  1 mg Oral Before breakfast    levalbuterol  1.25 mg Nebulization Q4H    losartan  50 mg Oral Daily    metoprolol tartrate  25 mg Oral BID    moxifloxacin  400 mg Intravenous Q24H    pantoprazole  40 mg Oral Daily    triamterene-hydrochlorothiazide 37.5-25 mg  1 tablet Oral Daily     Continuous Infusions:   PRN Meds:acetaminophen, cloNIDine, dextrose 50%, dextrose 50%, dextrose 50%, dextrose 50%, glucagon (human recombinant), glucagon (human recombinant), glucose, glucose, glucose, glucose,  hydrocodone-acetaminophen 5-325mg, hydrocodone-chlorpheniramine, insulin aspart, pneumoc 13-ingrid conj-dip cr(PF)    Review of patient's allergies indicates:   Allergen Reactions    Pcn [penicillins] Anaphylaxis       OBJECTIVE:     Vital Signs (Most Recent)  Temp: 97.9 °F (36.6 °C) (04/09/17 0746)  Pulse: 86 (04/09/17 1125)  Resp: 18 (04/09/17 1125)  BP: 132/60 (04/09/17 0746)  SpO2: 96 % (04/09/17 1125)    Vital Signs Range (Last 24H):  Temp:  [97.6 °F (36.4 °C)-97.9 °F (36.6 °C)]   Pulse:  [73-93]   Resp:  [16-20]   BP: (124-154)/(60-70)   SpO2:  [93 %-99 %]     I & O (Last 24H):    Intake/Output Summary (Last 24 hours) at 04/09/17 1149  Last data filed at 04/09/17 0550   Gross per 24 hour   Intake             1380 ml   Output                0 ml   Net             1380 ml     Physical Exam:  General appearance: well developed, appears stated age, frequent coughing bouts  Head: normocephalic, atraumatic  Eyes: conjunctivae/corneas clear. PERRL.  Nose: Nares normal. Septum midline.  Throat: lips, mucosa, and tongue normal; teeth and gums normal, no throat erythema.  Neck: supple, symmetrical, trachea midline, no JVD and thyroid not enlarged, symmetric, no tenderness/mass/nodules  Lungs: CTA bilateral   Chest wall: no tenderness  Heart: regular rate and rhythm, S1, S2 normal, no murmur, click, rub or gallop  Abdomen: soft, non-tender non-distented; bowel sounds normal; no masses, no organomegaly  Extremities: no cyanosis, clubbing or edema.   Pulses: 2+ and symmetric  Skin: Skin color, texture, turgor normal. No rashes or lesions.  Lymph nodes: Cervical, supraclavicular, and axillary nodes normal.  Neurologic: Grossly non-focal.    Laboratory:  CBC:     Recent Labs  Lab 04/09/17  0515   WBC 22.00*   RBC 5.38   HGB 12.6   HCT 40.5      MCV 75*   MCH 23.4*   MCHC 31.1*     CMP:     Recent Labs  Lab 04/09/17  0515   *   CALCIUM 8.5*      K 3.7   CO2 22*      BUN 33*   CREATININE 1.4        Diagnostic Results:  reviewed    ASSESSMENT/PLAN:     * COPD exacerbation  Supplemental O2 via nasal canula; titrate O2 saturation to >92%.   Decadron 4 mg x 1   Continue beta 2 agonist bronchodilator treatments.   Continue IV antibiotics.  Check sputum GS and Cx.   Continue routine medications as before.   Add Tussionex.      Hypertension  Improved control.  Chronic problem. Will continue chronic medications and monitor for any changes, adjusting as needed.  Use PO Clonidine prn SBP > 160 mmHg.      GERD (gastroesophageal reflux disease)  Continue PPI      Diabetes mellitus type II  Check blood glucose level q AC/HS.  Use Novolog Insulin Sliding Scale as needed.   Continue American Diabetic Association 1800 Kcal diet.     Discussed with patient's multiple family members. Hopefully DC home in AM.   VTE Risk Mitigation           Ordered       enoxaparin injection 40 mg Daily    Route: Subcutaneous          04/06/17 1414       Medium Risk of VTE Once      04/06/17 1414     Anton Rosas MD  [facility]  4/9/2017 12:39 PM

## 2017-04-10 LAB
BACTERIA SPEC AEROBE CULT: NORMAL
GRAM STN SPEC: NORMAL

## 2017-04-11 LAB
BACTERIA BLD CULT: NORMAL
BACTERIA BLD CULT: NORMAL

## 2017-04-11 NOTE — DISCHARGE SUMMARY
Discharge Summary      Admit Date: 4/6/2017    Discharge Date and Time:4/9/2017     Attending Physician: Anton Rosas     Discharge Physician: Anton Rosas    Principal Diagnoses: COPD exacerbation  The encounter diagnosis was COPD exacerbation.    Discharged Condition: stable    Hospital Course: Jeanette Evans is a 82 y.o. female with pmh  has a past medical history of Diabetes mellitus type II; GERD (gastroesophageal reflux disease); and Hypertension. who presented with COPD exacerbation. The following is the hospital course problems:Initial history of present illness: Patient is a 82 y.o. female admitted to Hospitalist Service from Dr. Killian's office with complaint of worsening SOB for few days. Patient is  female and unable to speak English. Patient's daughter at bedside provided history and interpreted. Patient reportedly has past medical history significant for COPD, GERD, HTN and DM-2. Reportedly, patient is having progressively worsening SOB. Patient reported mostly dry cough and wheezing. Patient denied prior smoking history. Patient also complaining of sinus congestion. No fever, chills, sick contact or travel history reported. Patient took unspecified antibiotic along with PO Prednisone last week without much improvement. Patient denied chest pain, abdominal pain, nausea, vomiting, headache, vision changes, focal neuro-deficits, cough or fever.  The patient was admitted for COPD exacerbation.  The patient responded well to antibiotics, DuoNeb treatments, and steroids.  Once patient's wheezing and shortness of breath had results patient was ready for discharge.      Consults: None      Disposition: Home or Self Care    Patient Instructions:   Discharge Medication List as of 4/9/2017  2:03 PM      START taking these medications    Details   albuterol 90 mcg/actuation inhaler Inhale 2 puffs into the lungs every 6 (six) hours as needed for Wheezing. Rescue, Starting 4/9/2017, Until  Discontinued, Normal      moxifloxacin (AVELOX) 400 mg tablet Take 1 tablet (400 mg total) by mouth once daily., Starting 4/9/2017, Until Discontinued, Normal      predniSONE (DELTASONE) 50 MG Tab Take 1 tablet (50 mg total) by mouth once daily., Starting 4/9/2017, Until Fri 4/14/17, Normal         CONTINUE these medications which have NOT CHANGED    Details   atorvastatin (LIPITOR) 10 MG tablet Take 10 mg by mouth once daily., Until Discontinued, Historical Med      doxycycline (MONODOX) 100 MG capsule Take 100 mg by mouth every 12 (twelve) hours., Until Discontinued, Historical Med      gabapentin (NEURONTIN) 300 MG capsule Take 300 mg by mouth 3 (three) times daily., Until Discontinued, Historical Med      glimepiride (AMARYL) 1 MG tablet Take 2 mg by mouth before breakfast. , Until Discontinued, Historical Med      guaifenesin-codeine 100-10 mg/5 ml (TUSSI-ORGANIDIN NR)  mg/5 mL syrup Take 5 mLs by mouth every 4 (four) hours as needed for Cough., Until Discontinued, Historical Med      losartan (COZAAR) 50 MG tablet Starting 2/18/2014, Until Discontinued, Historical Med      metoprolol tartrate (LOPRESSOR) 25 MG tablet Take 25 mg by mouth 2 (two) times daily., Until Discontinued, Historical Med      NEXIUM 40 mg capsule Starting 10/18/2013, Until Discontinued, Historical Med      SYMBICORT 160-4.5 mcg/actuation HFAA INL 2 PFS PO BID, Historical Med      ALENDRONATE SODIUM (FOSAMAX ORAL) Take 1 tablet by mouth once a week. 70mg po weekly, Until Discontinued, Historical Med      meclizine (ANTIVERT) 12.5 mg tablet Starting 10/1/2013, Until Discontinued, Historical Med      ramipril (ALTACE) 2.5 MG capsule Take 2.5 mg by mouth once daily., Until Discontinued, Historical Med      salicylic acid (SALACYN) 6 % Crea Apply 1 application topically 2 (two) times daily., Starting 5/4/2016, Until Discontinued, Normal      VOLTAREN 1 % Gel Starting 12/15/2015, Until Discontinued, Historical Med               Discharge  Procedure Orders  Diet general     Call MD for:  temperature >100.4     Call MD for:  persistent nausea and vomiting or diarrhea     Call MD for:  difficulty breathing or increased cough     Call MD for:  increased confusion or weakness         Anton Rosas  04/11/2017  1:18 PM

## 2017-04-11 NOTE — PLAN OF CARE
04/11/17 1626   Final Note   Assessment Type Discharge Planning Assessment   Discharge Disposition Home   Discharge planning education complete? Yes

## 2017-10-18 ENCOUNTER — OFFICE VISIT (OUTPATIENT)
Dept: PODIATRY | Facility: CLINIC | Age: 82
End: 2017-10-18
Payer: MEDICARE

## 2017-10-18 VITALS — BODY MASS INDEX: 37.09 KG/M2 | WEIGHT: 164.88 LBS | HEIGHT: 56 IN

## 2017-10-18 DIAGNOSIS — E11.42 DIABETIC POLYNEUROPATHY ASSOCIATED WITH TYPE 2 DIABETES MELLITUS: Primary | ICD-10-CM

## 2017-10-18 DIAGNOSIS — B35.1 ONYCHOMYCOSIS DUE TO DERMATOPHYTE: ICD-10-CM

## 2017-10-18 DIAGNOSIS — L84 CORN OR CALLUS: ICD-10-CM

## 2017-10-18 DIAGNOSIS — B35.3 TINEA PEDIS OF BOTH FEET: ICD-10-CM

## 2017-10-18 PROCEDURE — 17999 UNLISTD PX SKN MUC MEMB SUBQ: CPT | Mod: CSM,,, | Performed by: PODIATRIST

## 2017-10-18 PROCEDURE — 99999 PR PBB SHADOW E&M-EST. PATIENT-LVL II: CPT | Mod: PBBFAC,,, | Performed by: PODIATRIST

## 2017-10-18 PROCEDURE — 99213 OFFICE O/P EST LOW 20 MIN: CPT | Mod: S$GLB,,, | Performed by: PODIATRIST

## 2017-10-18 RX ORDER — GLIMEPIRIDE 2 MG/1
TABLET ORAL
COMMUNITY
Start: 2017-10-17 | End: 2019-04-23 | Stop reason: SDUPTHER

## 2017-10-18 RX ORDER — LATANOPROST 50 UG/ML
1 SOLUTION/ DROPS OPHTHALMIC NIGHTLY
COMMUNITY
Start: 2017-10-17 | End: 2021-08-16 | Stop reason: SDUPTHER

## 2017-10-18 RX ORDER — CLONIDINE HYDROCHLORIDE 0.1 MG/1
TABLET ORAL
Refills: 1 | COMMUNITY
Start: 2017-10-03 | End: 2019-04-23 | Stop reason: SDUPTHER

## 2017-10-18 RX ORDER — ALENDRONATE SODIUM 70 MG/1
TABLET ORAL
COMMUNITY
Start: 2017-08-18 | End: 2017-12-19 | Stop reason: SDUPTHER

## 2017-10-18 RX ORDER — CICLOPIROX 80 MG/ML
SOLUTION TOPICAL NIGHTLY
Qty: 6.6 ML | Refills: 11 | Status: SHIPPED | OUTPATIENT
Start: 2017-10-18 | End: 2019-04-23 | Stop reason: SDUPTHER

## 2017-10-18 RX ORDER — CICLOPIROX OLAMINE 7.7 MG/G
CREAM TOPICAL 2 TIMES DAILY
Qty: 90 G | Refills: 2 | Status: SHIPPED | OUTPATIENT
Start: 2017-10-18 | End: 2019-07-19

## 2017-10-18 NOTE — PROGRESS NOTES
Subjective:      Patient ID: Jeanette Evans is a 82 y.o. female.    Chief Complaint: Itching (dry and itching bilateral )    Jeanette is a 82 y.o. female who presents to the clinic for evaluation and treatment of high risk feet. Jeanette has a past medical history of Diabetes mellitus type II; GERD (gastroesophageal reflux disease); and Hypertension. The patient's chief complaint is long, thick toenails.  Gradual onset, worsening over past several weeks, aggravated by increased weight bearing, shoe gear, pressure.  Periodic debridement, penlac help symptoms.  This patient has documented high risk feet requiring routine maintenance secondary to diabetes mellitis and those secondary complications of diabetes, as mentioned..    CC2 itching flaking skin bottom both feet.  Gradual onset, worsening over past several weeks, aggravated by increased weight bearing, shoe gear, pressure.  No previous medical treatment.  OTC pain med not helping.  Denies signs infection.      PCP: Mervin Killian MD    Date Last Seen by PCP:   Chief Complaint   Patient presents with    Itching     dry and itching bilateral          Current shoe gear:  Affected Foot: Rx diabetic extra depth shoes and custom accommodative insoles     Unaffected Foot: Rx diabetic extra depth shoes and custom accommodative insoles < 1 year.    Hemoglobin A1C   Date Value Ref Range Status   04/06/2017 5.9 4.5 - 6.2 % Final     Comment:     According to ADA guidelines, hemoglobin A1C <7.0% represents  optimal control in non-pregnant diabetic patients.  Different  metrics may apply to specific populations.   Standards of Medical Care in Diabetes - 2016.  For the purpose of screening for the presence of diabetes:  <5.7%     Consistent with the absence of diabetes  5.7-6.4%  Consistent with increasing risk for diabetes   (prediabetes)  >or=6.5%  Consistent with diabetes  Currently no consensus exists for use of hemoglobin A1C  for diagnosis of diabetes for children.          Review of Systems   Constitution: Negative for chills, diaphoresis, fever, malaise/fatigue and night sweats.   Cardiovascular: Negative for claudication, cyanosis, leg swelling and syncope.   Skin: Positive for nail changes. Negative for color change, dry skin, rash, suspicious lesions and unusual hair distribution.   Musculoskeletal: Negative for falls, joint pain, joint swelling, muscle cramps, muscle weakness and stiffness.   Gastrointestinal: Negative for constipation, diarrhea, nausea and vomiting.   Neurological: Positive for sensory change. Negative for brief paralysis, disturbances in coordination, focal weakness, numbness, paresthesias and tremors.           Objective:      Physical Exam   Constitutional: She is oriented to person, place, and time. She appears well-developed and well-nourished. She is cooperative. No distress.   Cardiovascular:   Pulses:       Popliteal pulses are 2+ on the right side, and 2+ on the left side.        Dorsalis pedis pulses are 2+ on the right side, and 2+ on the left side.        Posterior tibial pulses are 1+ on the right side, and 1+ on the left side.   capillary refill 3-4 seconds all toes/distal feet, all toes/both feet warm to touch. Negavie lower extremity edema bilateral.     Musculoskeletal:   Decreased stride, station of gait.  moderately propulsive toe off.  Increased angle and base of gait.    All toes without clubbing, cyanosis, or signs of ischemia.    Range of motion, stability, muscle strength and tone age and health appropriate normal bilateral feet and legs.     Lymphadenopathy:        Right: No inguinal adenopathy present.        Left: No inguinal adenopathy present.   Negative lymphadenopathy bilateral popliteal fossa and tarsal tunnel.   Neurological: She is alert and oriented to person, place, and time. She has normal strength. She is not disoriented. She displays no atrophy and no tremor. A sensory deficit is present. She exhibits normal muscle  tone. Coordination and gait normal.   Reflex Scores:       Patellar reflexes are 2+ on the right side and 2+ on the left side.       Achilles reflexes are 2+ on the right side and 2+ on the left side.  Decreased/absent vibratory sensation bilateral feet to 128Hz tuning fork.    Diminished/loss of protective sensation all toes bilateral to 10 gram monofilament.         Skin: Skin is warm, dry and intact. No abrasion, no bruising, no burn, no ecchymosis, no laceration, no lesion and no rash noted. She is not diaphoretic. No cyanosis or erythema. No pallor. Nails show no clubbing.   Dry scale with superficial flakes over an erythematous base plantar feet bilateral without ulceration, drainage, pus, tracking, fluctuance, malodor, or cardinal signs infection.    Otheriwse, Skin is normal age and health appropriate color, turgor, texture, and temperature bilateral lower extremities without ulceration, hyperpigmentation, discoloration, masses nodules or cords palpated.  No ecchymosis, erythema, edema, or cardinal signs of infection bilateral lower extremities.    Focal hyperkeratotic lesion consisting entirely of hyperkeratotic tissue without open skin, drainage, pus, fluctuance, malodor, or signs of infection dorsal 5th dipj right.      Toenails 1st, 2nd, 3rd, 4th, 5th  bilateral are hypertrophic thickened 2-3 mm, dystrophic, discolored tanish brown with tan, gray crumbly subungual debris.  Tender to distal nail plate pressure, without periungual skin abnormality of each.               Assessment:       Encounter Diagnoses   Name Primary?    Diabetic polyneuropathy associated with type 2 diabetes mellitus Yes    Onychomycosis due to dermatophyte     Corn or callus     Tinea pedis of both feet          Plan:       Jeanette was seen today for itching.    Diagnoses and all orders for this visit:    Diabetic polyneuropathy associated with type 2 diabetes mellitus    Onychomycosis due to dermatophyte    Corn or callus    Tinea  pedis of both feet    Other orders  -     ciclopirox (LOPROX) 0.77 % Crea; Apply topically 2 (two) times daily.  -     ciclopirox (PENLAC) 8 % Soln; Apply topically nightly.      I counseled the patient on her conditions, their implications and medical management.        - Shoe inspection. Diabetic Foot Education. Patient reminded of the importance of good nutrition and blood sugar control to help prevent podiatric complications of diabetes. Patient instructed on proper foot hygeine. We discussed wearing proper shoe gear, daily foot inspections, never walking without protective shoe gear, never putting sharp instruments to feet, routine podiatric visits at least annually.      - With patient's permission, nails were aggressively reduced and debrided x 10 to their soft tissue attachment mechanically, removing all offending nail and debris. Patient relates relief following the procedure. She will continue to monitor the areas daily, inspect her feet, wear protective shoe gear when ambulatory, moisturizer to maintain skin integrity and follow in this office p.r.n.    continue  Penlac(refilled), diabetic shoes, inserts, salacyn.    Rx loprox cream bid.

## 2017-11-07 RX ORDER — ALBUTEROL SULFATE 90 UG/1
AEROSOL, METERED RESPIRATORY (INHALATION)
Qty: 18 G | Refills: 0 | Status: SHIPPED | OUTPATIENT
Start: 2017-11-07 | End: 2018-11-15

## 2017-12-05 ENCOUNTER — TELEPHONE (OUTPATIENT)
Dept: OPHTHALMOLOGY | Facility: CLINIC | Age: 82
End: 2017-12-05

## 2017-12-05 NOTE — TELEPHONE ENCOUNTER
----- Message from Elsie Garcia sent at 12/5/2017  1:55 PM CST -----  Contact: daughter-Michelle Ashby  Needs a new retina specialist for degeneration bc her old one does not accept insurance. Please call back at  cell

## 2017-12-12 ENCOUNTER — TELEPHONE (OUTPATIENT)
Dept: OPHTHALMOLOGY | Facility: CLINIC | Age: 82
End: 2017-12-12

## 2017-12-12 NOTE — TELEPHONE ENCOUNTER
----- Message from Margo Thompson sent at 12/12/2017  2:20 PM CST -----  Contact: Wandy from [facility]  Calling to get pt in sooner than 01/08, most likely before before the end of this year because her condition is deteriorating. Please call [phone] Press Option 5

## 2017-12-12 NOTE — TELEPHONE ENCOUNTER
----- Message from Dina Luis sent at 12/12/2017 10:54 AM CST -----  Contact: Jeanette Evans MsGladis, from the [facility] would like to speak with Raffi Condon nurse to rescheduled the pt sooner ,she can be reached at [phone] ext # [phone] please thank you.

## 2017-12-19 ENCOUNTER — INITIAL CONSULT (OUTPATIENT)
Dept: OPHTHALMOLOGY | Facility: CLINIC | Age: 82
End: 2017-12-19
Payer: MEDICARE

## 2017-12-19 VITALS — DIASTOLIC BLOOD PRESSURE: 68 MMHG | HEART RATE: 80 BPM | SYSTOLIC BLOOD PRESSURE: 131 MMHG

## 2017-12-19 DIAGNOSIS — H35.3221 EXUDATIVE AGE-RELATED MACULAR DEGENERATION OF LEFT EYE WITH ACTIVE CHOROIDAL NEOVASCULARIZATION: Primary | ICD-10-CM

## 2017-12-19 DIAGNOSIS — H43.823 VITREOMACULAR ADHESION, BILATERAL: ICD-10-CM

## 2017-12-19 DIAGNOSIS — H35.033 HYPERTENSIVE RETINOPATHY, BILATERAL: ICD-10-CM

## 2017-12-19 DIAGNOSIS — H35.3212 EXUDATIVE AGE-RELATED MACULAR DEGENERATION OF RIGHT EYE WITH INACTIVE CHOROIDAL NEOVASCULARIZATION: ICD-10-CM

## 2017-12-19 PROCEDURE — 99999 PR PBB SHADOW E&M-EST. PATIENT-LVL III: CPT | Mod: PBBFAC,,, | Performed by: OPHTHALMOLOGY

## 2017-12-19 PROCEDURE — 67028 INJECTION EYE DRUG: CPT | Mod: LT,S$GLB,, | Performed by: OPHTHALMOLOGY

## 2017-12-19 PROCEDURE — 92004 COMPRE OPH EXAM NEW PT 1/>: CPT | Mod: 25,S$GLB,, | Performed by: OPHTHALMOLOGY

## 2017-12-19 RX ADMIN — Medication 1.25 MG: at 05:12

## 2017-12-19 NOTE — PATIENT INSTRUCTIONS
POST INTRAVITREAL INJECTION PATIENT INSTRUCTIONS   Below are some guidelines for what to expect after your treatment and additional care instructions.   * you may experience mild discomfort in your eye after the treatment. Your eye usually feels better within 24 to 48 hours after the procedure.   * you have been given drops that numb the surface of your eye.   DO NOT rub or touch your eye, DO NOT wear contacts until numbness goes away.   * you may experience small spots that appear in your field of vision, these are usually caused by an air bubble or from the medication. It taked a few hours or days for these to go away.   * use of sunglasses will help reduce light sensitivity and glare.   * DO NOT swim or put sink water ( tap water ) or swin for at least 24 hours after the injection   * If you have a gritty, burning, irritating or stinging feeling in the injected eye. This may be a result of the antiseptic used. Use artifical tears or eye lubricant ( from over- the- counter from your local pharmacy ). If you have some at home already please check the expiration date, so not to use anything contaminated in your eye. A cool pack over your eye brow above the injected eye may also relieve discomfort.   Call us right away or go to the Emergency Department if you have a dramatic decrease in vision or extreme pain in the eye.   [facility] [phone]

## 2017-12-19 NOTE — PROGRESS NOTES
[place] [facility] Dr. 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 Tired feeling and dry eyes   Ou   OTC systane PRN    Latanoprost OS QHS sometimes will put in both eyes   (-)Flashes (+)Floaters  (-)Photophobia  (-)Glare      Last edited by Edilia Red on 12/19/2017  3:42 PM. 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 HTN REt OU    3. NS OD - no Sx because of #1  PCIOL OS        6-8 weeks OCT    Risks, benefits, and alternatives to treatment discussed in detail with the patient.  The patient voiced understanding and wished to proceed with the procedure    Injection Procedure Note:  Diagnosis: Wet AMD OS    Topical Proparacaine and Betadine.  Inject Avastin OS at 6:00 @ 3.5-4mm posterior to limbus  Post Operative Dx: Same  Complications: None  Follow up as above.

## 2018-01-24 ENCOUNTER — TELEPHONE (OUTPATIENT)
Dept: OPHTHALMOLOGY | Facility: CLINIC | Age: 83
End: 2018-01-24

## 2018-01-31 ENCOUNTER — TELEPHONE (OUTPATIENT)
Dept: OPHTHALMOLOGY | Facility: CLINIC | Age: 83
End: 2018-01-31

## 2018-02-15 ENCOUNTER — PROCEDURE VISIT (OUTPATIENT)
Dept: OPHTHALMOLOGY | Facility: CLINIC | Age: 83
End: 2018-02-15
Attending: OPHTHALMOLOGY
Payer: MEDICARE

## 2018-02-15 VITALS — DIASTOLIC BLOOD PRESSURE: 66 MMHG | SYSTOLIC BLOOD PRESSURE: 142 MMHG | HEART RATE: 63 BPM

## 2018-02-15 DIAGNOSIS — H35.3221 EXUDATIVE AGE-RELATED MACULAR DEGENERATION OF LEFT EYE WITH ACTIVE CHOROIDAL NEOVASCULARIZATION: Primary | ICD-10-CM

## 2018-02-15 DIAGNOSIS — H43.823 VITREOMACULAR ADHESION, BILATERAL: ICD-10-CM

## 2018-02-15 DIAGNOSIS — H35.3212 EXUDATIVE AGE-RELATED MACULAR DEGENERATION OF RIGHT EYE WITH INACTIVE CHOROIDAL NEOVASCULARIZATION: ICD-10-CM

## 2018-02-15 DIAGNOSIS — H35.033 HYPERTENSIVE RETINOPATHY, BILATERAL: ICD-10-CM

## 2018-02-15 PROCEDURE — 92134 CPTRZ OPH DX IMG PST SGM RTA: CPT | Mod: S$GLB,,, | Performed by: OPHTHALMOLOGY

## 2018-02-15 PROCEDURE — 92014 COMPRE OPH EXAM EST PT 1/>: CPT | Mod: 25,S$GLB,, | Performed by: OPHTHALMOLOGY

## 2018-02-15 PROCEDURE — 67028 INJECTION EYE DRUG: CPT | Mod: LT,S$GLB,, | Performed by: OPHTHALMOLOGY

## 2018-02-15 RX ORDER — GLIMEPIRIDE 2 MG/1
TABLET ORAL
Status: ON HOLD | COMMUNITY
Start: 2018-01-15 | End: 2022-05-06 | Stop reason: HOSPADM

## 2018-02-15 RX ORDER — LOSARTAN POTASSIUM 50 MG/1
TABLET ORAL
COMMUNITY
Start: 2017-09-15

## 2018-02-15 RX ORDER — CICLOPIROX OLAMINE 7.7 MG/G
CREAM TOPICAL
COMMUNITY
Start: 2017-10-18 | End: 2019-04-23 | Stop reason: SDUPTHER

## 2018-02-15 RX ORDER — CLONIDINE HYDROCHLORIDE 0.1 MG/1
TABLET ORAL
COMMUNITY
Start: 2017-10-03

## 2018-02-15 RX ORDER — BENZONATATE 200 MG/1
CAPSULE ORAL
Refills: 0 | COMMUNITY
Start: 2017-11-17 | End: 2019-04-23 | Stop reason: SDUPTHER

## 2018-02-15 RX ORDER — LATANOPROST 50 UG/ML
SOLUTION/ DROPS OPHTHALMIC
COMMUNITY
Start: 2017-10-17 | End: 2018-02-15 | Stop reason: SDUPTHER

## 2018-02-15 RX ORDER — METOPROLOL TARTRATE 25 MG/1
TABLET, FILM COATED ORAL
COMMUNITY
Start: 2017-11-02

## 2018-02-15 RX ORDER — PREDNISONE 20 MG/1
TABLET ORAL
COMMUNITY
Start: 2018-01-02 | End: 2019-04-23

## 2018-02-15 RX ORDER — BUDESONIDE AND FORMOTEROL FUMARATE DIHYDRATE 160; 4.5 UG/1; UG/1
AEROSOL RESPIRATORY (INHALATION)
COMMUNITY
Start: 2018-01-02 | End: 2019-01-02

## 2018-02-15 RX ORDER — GABAPENTIN 300 MG/1
CAPSULE ORAL
COMMUNITY
Start: 2018-02-07 | End: 2022-05-06

## 2018-02-15 RX ORDER — ALBUTEROL SULFATE 90 UG/1
AEROSOL, METERED RESPIRATORY (INHALATION)
COMMUNITY
End: 2019-04-23 | Stop reason: SDUPTHER

## 2018-02-15 RX ORDER — SULFAMETHOXAZOLE AND TRIMETHOPRIM 800; 160 MG/1; MG/1
TABLET ORAL
COMMUNITY
Start: 2018-01-02 | End: 2019-04-23

## 2018-02-15 RX ORDER — ESOMEPRAZOLE MAGNESIUM 40 MG/1
CAPSULE, DELAYED RELEASE ORAL
COMMUNITY
Start: 2017-10-03

## 2018-02-15 RX ORDER — ATORVASTATIN CALCIUM 10 MG/1
10 TABLET, FILM COATED ORAL
COMMUNITY

## 2018-02-15 RX ORDER — ALENDRONATE SODIUM 70 MG/1
TABLET ORAL
COMMUNITY
Start: 2017-08-18 | End: 2019-04-23 | Stop reason: SDUPTHER

## 2018-02-15 RX ORDER — DOXYCYCLINE 100 MG/1
CAPSULE ORAL
COMMUNITY
Start: 2017-11-30 | End: 2019-04-23

## 2018-02-15 RX ADMIN — Medication 1.25 MG: at 03:02

## 2018-02-15 NOTE — PROGRESS NOTES
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 HTN REt OU    3. NS OD - no Sx because of #1  PCIOL OS        10-12 weeks OCT    Risks, benefits, and alternatives to treatment discussed in detail with the patient.  The patient voiced understanding and wished to proceed with the procedure    Injection Procedure Note:  Diagnosis: Wet AMD OS    Topical Proparacaine and Betadine.  Inject Avastin OS at 6:00 @ 3.5-4mm posterior to limbus  Post Operative Dx: Same  Complications: None  Follow up as above.

## 2018-02-15 NOTE — PATIENT INSTRUCTIONS

## 2018-04-27 ENCOUNTER — OFFICE VISIT (OUTPATIENT)
Dept: PODIATRY | Facility: CLINIC | Age: 83
End: 2018-04-27
Payer: MEDICARE

## 2018-04-27 VITALS — HEIGHT: 56 IN | BODY MASS INDEX: 36.26 KG/M2 | WEIGHT: 161.19 LBS

## 2018-04-27 DIAGNOSIS — B35.1 ONYCHOMYCOSIS DUE TO DERMATOPHYTE: ICD-10-CM

## 2018-04-27 DIAGNOSIS — Z01.818 PRE-OP TESTING: ICD-10-CM

## 2018-04-27 DIAGNOSIS — E11.42 DIABETIC POLYNEUROPATHY ASSOCIATED WITH TYPE 2 DIABETES MELLITUS: Primary | ICD-10-CM

## 2018-04-27 PROCEDURE — 99999 PR PBB SHADOW E&M-EST. PATIENT-LVL III: CPT | Mod: PBBFAC,,, | Performed by: PODIATRIST

## 2018-04-27 PROCEDURE — 99212 OFFICE O/P EST SF 10 MIN: CPT | Mod: S$GLB,,, | Performed by: PODIATRIST

## 2018-04-27 RX ORDER — IPRATROPIUM BROMIDE AND ALBUTEROL SULFATE 2.5; .5 MG/3ML; MG/3ML
SOLUTION RESPIRATORY (INHALATION)
COMMUNITY
Start: 2018-04-11

## 2018-04-27 RX ORDER — PREDNISONE 20 MG/1
TABLET ORAL
COMMUNITY
Start: 2018-04-11 | End: 2019-04-23 | Stop reason: SDUPTHER

## 2018-04-27 RX ORDER — ESOMEPRAZOLE MAGNESIUM 40 MG/1
CAPSULE, DELAYED RELEASE ORAL
COMMUNITY
Start: 2018-03-27 | End: 2019-04-23 | Stop reason: SDUPTHER

## 2018-04-27 RX ORDER — ALENDRONATE SODIUM 70 MG/1
TABLET ORAL
COMMUNITY
Start: 2018-03-27

## 2018-04-27 RX ORDER — ALBUTEROL SULFATE 90 UG/1
AEROSOL, METERED RESPIRATORY (INHALATION)
COMMUNITY
End: 2018-11-15

## 2018-04-27 RX ORDER — LOSARTAN POTASSIUM 50 MG/1
TABLET ORAL
COMMUNITY
Start: 2018-03-27 | End: 2019-04-23 | Stop reason: SDUPTHER

## 2018-04-27 NOTE — PROGRESS NOTES
Subjective:      Patient ID: Jeanette Evans is a 83 y.o. female.    Chief Complaint: Toe Pain (bilateral toe sharp shooting pain) and Nail Problem (pain in bilateral toenails)    Jeanette is a 83 y.o. female who presents to the clinic for evaluation and treatment of high risk feet. Jeanette has a past medical history of Diabetes mellitus type II; GERD (gastroesophageal reflux disease); and Hypertension. The patient's chief complaint is long, thick toenails.  Gradual onset, worsening over past several weeks, aggravated by increased weight bearing, shoe gear, pressure.  Periodic debridement, penlac help symptoms.  This patient has documented high risk feet requiring routine maintenance secondary to diabetes mellitis and those secondary complications of diabetes, as mentioned..    CC2 itching flaking skin bottom both feet.  Gradual onset, worsening over past several weeks, aggravated by increased weight bearing, shoe gear, pressure.  No previous medical treatment.  OTC pain med not helping.  Denies signs infection.      PCP: Mervin Killian MD    Date Last Seen by PCP:   Chief Complaint   Patient presents with    Toe Pain     bilateral toe sharp shooting pain    Nail Problem     pain in bilateral toenails         Current shoe gear:  Affected Foot: Rx diabetic extra depth shoes and custom accommodative insoles     Unaffected Foot: Rx diabetic extra depth shoes and custom accommodative insoles < 1 year.    Hemoglobin A1C   Date Value Ref Range Status   04/06/2017 5.9 4.5 - 6.2 % Final     Comment:     According to ADA guidelines, hemoglobin A1C <7.0% represents  optimal control in non-pregnant diabetic patients.  Different  metrics may apply to specific populations.   Standards of Medical Care in Diabetes - 2016.  For the purpose of screening for the presence of diabetes:  <5.7%     Consistent with the absence of diabetes  5.7-6.4%  Consistent with increasing risk for diabetes   (prediabetes)  >or=6.5%  Consistent with  diabetes  Currently no consensus exists for use of hemoglobin A1C  for diagnosis of diabetes for children.         Review of Systems   Constitution: Negative for chills, diaphoresis, fever, malaise/fatigue and night sweats.   Cardiovascular: Negative for claudication, cyanosis, leg swelling and syncope.   Skin: Positive for nail changes. Negative for color change, dry skin, rash, suspicious lesions and unusual hair distribution.   Musculoskeletal: Negative for falls, joint pain, joint swelling, muscle cramps, muscle weakness and stiffness.   Gastrointestinal: Negative for constipation, diarrhea, nausea and vomiting.   Neurological: Positive for sensory change. Negative for brief paralysis, disturbances in coordination, focal weakness, numbness, paresthesias and tremors.           Objective:      Physical Exam   Constitutional: She is oriented to person, place, and time. She appears well-developed and well-nourished. She is cooperative. No distress.   Cardiovascular:   Pulses:       Popliteal pulses are 2+ on the right side, and 2+ on the left side.        Dorsalis pedis pulses are 2+ on the right side, and 2+ on the left side.        Posterior tibial pulses are 1+ on the right side, and 1+ on the left side.   capillary refill 3-4 seconds all toes/distal feet, all toes/both feet warm to touch. Negavie lower extremity edema bilateral.     Musculoskeletal:   Decreased stride, station of gait.  moderately propulsive toe off.  Increased angle and base of gait.    All toes without clubbing, cyanosis, or signs of ischemia.    Range of motion, stability, muscle strength and tone age and health appropriate normal bilateral feet and legs.     Lymphadenopathy:        Right: No inguinal adenopathy present.        Left: No inguinal adenopathy present.   Negative lymphadenopathy bilateral popliteal fossa and tarsal tunnel.   Neurological: She is alert and oriented to person, place, and time. She has normal strength. She is not  disoriented. She displays no atrophy and no tremor. A sensory deficit is present. She exhibits normal muscle tone. Coordination and gait normal.   Reflex Scores:       Patellar reflexes are 2+ on the right side and 2+ on the left side.       Achilles reflexes are 2+ on the right side and 2+ on the left side.  Decreased/absent vibratory sensation bilateral feet to 128Hz tuning fork.    Diminished/loss of protective sensation all toes bilateral to 10 gram monofilament.         Skin: Skin is warm, dry and intact. No abrasion, no bruising, no burn, no ecchymosis, no laceration, no lesion and no rash noted. She is not diaphoretic. No cyanosis or erythema. No pallor. Nails show no clubbing.   Dry scale with superficial flakes over an erythematous base plantar feet bilateral without ulceration, drainage, pus, tracking, fluctuance, malodor, or cardinal signs infection.    Otheriwse, Skin is normal age and health appropriate color, turgor, texture, and temperature bilateral lower extremities without ulceration, hyperpigmentation, discoloration, masses nodules or cords palpated.  No ecchymosis, erythema, edema, or cardinal signs of infection bilateral lower extremities.    Focal hyperkeratotic lesion consisting entirely of hyperkeratotic tissue without open skin, drainage, pus, fluctuance, malodor, or signs of infection dorsal 5th dipj right.      Toenails 1st, 2nd, 3rd, 4th, 5th  bilateral are hypertrophic thickened 2-3 mm, dystrophic, discolored tanish brown with tan, gray crumbly subungual debris.  Tender to distal nail plate pressure, without periungual skin abnormality of each.               Assessment:       Encounter Diagnoses   Name Primary?    Diabetic polyneuropathy associated with type 2 diabetes mellitus Yes    Onychomycosis due to dermatophyte          Plan:       Jeanette was seen today for toe pain and nail problem.    Diagnoses and all orders for this visit:    Diabetic polyneuropathy associated with type 2  diabetes mellitus    Onychomycosis due to dermatophyte      I counseled the patient on her conditions, their implications and medical management.        - Shoe inspection. Diabetic Foot Education. Patient reminded of the importance of good nutrition and blood sugar control to help prevent podiatric complications of diabetes. Patient instructed on proper foot hygeine. We discussed wearing proper shoe gear, daily foot inspections, never walking without protective shoe gear, never putting sharp instruments to feet, routine podiatric visits at least annually.      - With patient's permission, nails were aggressively reduced and debrided x 10 to their soft tissue attachment mechanically, removing all offending nail and debris. Patient relates relief following the procedure. She will continue to monitor the areas daily, inspect her feet, wear protective shoe gear when ambulatory, moisturizer to maintain skin integrity and follow in this office p.r.n.    continue  Penlac(refilled), diabetic shoes, inserts, salacyn.    Rx loprox cream bid.

## 2018-05-14 ENCOUNTER — PROCEDURE VISIT (OUTPATIENT)
Dept: OPHTHALMOLOGY | Facility: CLINIC | Age: 83
End: 2018-05-14
Attending: OPHTHALMOLOGY
Payer: MEDICARE

## 2018-05-14 VITALS — HEART RATE: 86 BPM | SYSTOLIC BLOOD PRESSURE: 139 MMHG | DIASTOLIC BLOOD PRESSURE: 63 MMHG

## 2018-05-14 DIAGNOSIS — H35.3212 EXUDATIVE AGE-RELATED MACULAR DEGENERATION OF RIGHT EYE WITH INACTIVE CHOROIDAL NEOVASCULARIZATION: ICD-10-CM

## 2018-05-14 DIAGNOSIS — H35.3221 EXUDATIVE AGE-RELATED MACULAR DEGENERATION OF LEFT EYE WITH ACTIVE CHOROIDAL NEOVASCULARIZATION: Primary | ICD-10-CM

## 2018-05-14 DIAGNOSIS — H35.033 HYPERTENSIVE RETINOPATHY, BILATERAL: ICD-10-CM

## 2018-05-14 DIAGNOSIS — H43.823 VITREOMACULAR ADHESION, BILATERAL: ICD-10-CM

## 2018-05-14 PROCEDURE — 92134 CPTRZ OPH DX IMG PST SGM RTA: CPT | Mod: S$GLB,,, | Performed by: OPHTHALMOLOGY

## 2018-05-14 PROCEDURE — 92014 COMPRE OPH EXAM EST PT 1/>: CPT | Mod: 25,S$GLB,, | Performed by: OPHTHALMOLOGY

## 2018-05-14 PROCEDURE — 67028 INJECTION EYE DRUG: CPT | Mod: LT,S$GLB,, | Performed by: OPHTHALMOLOGY

## 2018-05-14 RX ADMIN — Medication 1.25 MG: at 10:05

## 2018-05-14 NOTE — PROGRESS NOTES
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 HTN REt OU    3. NS OD - no Sx because of #1  PCIOL OS        11-12 weeks OCT    Risks, benefits, and alternatives to treatment discussed in detail with the patient.  The patient voiced understanding and wished to proceed with the procedure    Injection Procedure Note:  Diagnosis: Wet AMD OS    Topical Proparacaine and Betadine.  Inject Avastin OS at 6:00 @ 3.5-4mm posterior to limbus  Post Operative Dx: Same  Complications: None  Follow up as above.

## 2018-05-14 NOTE — PATIENT INSTRUCTIONS

## 2018-08-20 ENCOUNTER — PROCEDURE VISIT (OUTPATIENT)
Dept: OPHTHALMOLOGY | Facility: CLINIC | Age: 83
End: 2018-08-20
Payer: MEDICARE

## 2018-08-20 VITALS — DIASTOLIC BLOOD PRESSURE: 70 MMHG | SYSTOLIC BLOOD PRESSURE: 146 MMHG | HEART RATE: 74 BPM

## 2018-08-20 DIAGNOSIS — H35.3212 EXUDATIVE AGE-RELATED MACULAR DEGENERATION OF RIGHT EYE WITH INACTIVE CHOROIDAL NEOVASCULARIZATION: ICD-10-CM

## 2018-08-20 DIAGNOSIS — H35.3221 EXUDATIVE AGE-RELATED MACULAR DEGENERATION OF LEFT EYE WITH ACTIVE CHOROIDAL NEOVASCULARIZATION: Primary | ICD-10-CM

## 2018-08-20 DIAGNOSIS — H35.033 HYPERTENSIVE RETINOPATHY, BILATERAL: ICD-10-CM

## 2018-08-20 PROCEDURE — 92014 COMPRE OPH EXAM EST PT 1/>: CPT | Mod: 25,S$GLB,, | Performed by: OPHTHALMOLOGY

## 2018-08-20 PROCEDURE — 92134 CPTRZ OPH DX IMG PST SGM RTA: CPT | Mod: S$GLB,,, | Performed by: OPHTHALMOLOGY

## 2018-08-20 PROCEDURE — 67028 INJECTION EYE DRUG: CPT | Mod: LT,S$GLB,, | Performed by: OPHTHALMOLOGY

## 2018-08-20 RX ADMIN — Medication 1.25 MG: at 10:08

## 2018-08-20 NOTE — PROGRESS NOTES
HPI     3 mo / OCT / Avastin   DLS- 05/14/2018 Dr. Nugent       Pt sts no change in va noticed since last visit, denies pain.   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 HTN REt OU    3. NS OD - no Sx because of #1  PCIOL OS        11-12 weeks OCT    Risks, benefits, and alternatives to treatment discussed in detail with the patient.  The patient voiced understanding and wished to proceed with the procedure    Injection Procedure Note:  Diagnosis: Wet AMD OS    Topical Proparacaine and Betadine.  Inject Avastin OS at 6:00 @ 3.5-4mm posterior to limbus  Post Operative Dx: Same  Complications: None  Follow up as above.

## 2018-08-20 NOTE — PATIENT INSTRUCTIONS
POST INTRAVITREAL INJECTION PATIENT INSTRUCTIONS   Below are some guidelines for what to expect after your treatment and additional care instructions.   * you may experience mild discomfort in your eye after the treatment. Your eye usually feels better within 24 to 48 hours after the procedure.   * you have been given drops that numb the surface of your eye.   DO NOT rub or touch your eye, DO NOT wear contacts until numbness goes away.   * you may experience small spots that appear in your field of vision, these are usually caused by an air bubble or from the medication. It taked a few hours or days for these to go away.   * use of sunglasses will help reduce light sensitivity and glare.   * DO NOT swim or put sink water ( tap water ) or swin for at least 24 hours after the injection   * If you have a gritty, burning, irritating or stinging feeling in the injected eye. This may be a result of the antiseptic used. Use artifical tears or eye lubricant ( from over- the- counter from your local pharmacy ). If you have some at home already please check the expiration date, so not to use anything contaminated in your eye. A cool pack over your eye brow above the injected eye may also relieve discomfort.   Call us right away or go to the Emergency Department if you have a dramatic decrease in vision or extreme pain in the eye.   [facility] [phone]

## 2018-11-15 ENCOUNTER — PROCEDURE VISIT (OUTPATIENT)
Dept: OPHTHALMOLOGY | Facility: CLINIC | Age: 83
End: 2018-11-15
Attending: OPHTHALMOLOGY
Payer: MEDICARE

## 2018-11-15 DIAGNOSIS — H35.3212 EXUDATIVE AGE-RELATED MACULAR DEGENERATION OF RIGHT EYE WITH INACTIVE CHOROIDAL NEOVASCULARIZATION: ICD-10-CM

## 2018-11-15 DIAGNOSIS — H35.3221 EXUDATIVE AGE-RELATED MACULAR DEGENERATION OF LEFT EYE WITH ACTIVE CHOROIDAL NEOVASCULARIZATION: Primary | ICD-10-CM

## 2018-11-15 PROCEDURE — 92134 CPTRZ OPH DX IMG PST SGM RTA: CPT | Mod: S$GLB,,, | Performed by: OPHTHALMOLOGY

## 2018-11-15 PROCEDURE — 92014 COMPRE OPH EXAM EST PT 1/>: CPT | Mod: 25,S$GLB,, | Performed by: OPHTHALMOLOGY

## 2018-11-15 PROCEDURE — 67028 INJECTION EYE DRUG: CPT | Mod: LT,S$GLB,, | Performed by: OPHTHALMOLOGY

## 2018-11-15 RX ORDER — ALBUTEROL SULFATE 90 UG/1
2 AEROSOL, METERED RESPIRATORY (INHALATION) EVERY 6 HOURS PRN
COMMUNITY
Start: 2018-08-27 | End: 2019-02-23

## 2018-11-15 RX ADMIN — Medication 1.25 MG: at 11:11

## 2018-11-15 NOTE — PROGRESS NOTES
HPI     Macular Degeneration      Additional comments: 3 mon chk              Comments     Patient states she continues to see flashes in VA(not sure which eye).    Latanoprost QHS   AT's PRN     HPI     3 mo / OCT / Avastin   DLS- 05/14/2018 Dr. Nugent       Pt sts no change in va noticed since last visit, denies pain.   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 HTN REt OU    3. NS OD - no Sx because of #1  PCIOL OS        11-12 weeks OCT    Risks, benefits, and alternatives to treatment discussed in detail with the patient.  The patient voiced understanding and wished to proceed with the procedure    Injection Procedure Note:  Diagnosis: Wet AMD OS    Patient Identified and Time Out complete  Topical Proparacaine and Betadine.  Inject AVastin OS at 6:00 @ 3.5-4mm posterior to limbus  Post Operative Dx: Same  Complications: None  Follow up as above.

## 2018-12-31 ENCOUNTER — OFFICE VISIT (OUTPATIENT)
Dept: URGENT CARE | Facility: CLINIC | Age: 83
End: 2018-12-31
Payer: MEDICARE

## 2018-12-31 VITALS
SYSTOLIC BLOOD PRESSURE: 143 MMHG | BODY MASS INDEX: 35.46 KG/M2 | DIASTOLIC BLOOD PRESSURE: 79 MMHG | WEIGHT: 157.63 LBS | HEIGHT: 56 IN | RESPIRATION RATE: 12 BRPM | TEMPERATURE: 98 F | OXYGEN SATURATION: 95 % | HEART RATE: 87 BPM

## 2018-12-31 DIAGNOSIS — J44.1 COPD EXACERBATION: ICD-10-CM

## 2018-12-31 DIAGNOSIS — J40 BRONCHITIS: Primary | ICD-10-CM

## 2018-12-31 PROCEDURE — 99204 OFFICE O/P NEW MOD 45 MIN: CPT | Mod: 25,S$GLB,, | Performed by: NURSE PRACTITIONER

## 2018-12-31 PROCEDURE — 1101F PT FALLS ASSESS-DOCD LE1/YR: CPT | Mod: CPTII,S$GLB,, | Performed by: NURSE PRACTITIONER

## 2018-12-31 PROCEDURE — 94640 AIRWAY INHALATION TREATMENT: CPT | Mod: S$GLB,,, | Performed by: NURSE PRACTITIONER

## 2018-12-31 RX ORDER — ALBUTEROL SULFATE 90 UG/1
2 AEROSOL, METERED RESPIRATORY (INHALATION) EVERY 6 HOURS PRN
Qty: 18 G | Refills: 0 | Status: SHIPPED | OUTPATIENT
Start: 2018-12-31 | End: 2019-04-23

## 2018-12-31 RX ORDER — PROMETHAZINE HYDROCHLORIDE AND DEXTROMETHORPHAN HYDROBROMIDE 6.25; 15 MG/5ML; MG/5ML
5 SYRUP ORAL EVERY 4 HOURS PRN
Qty: 240 ML | Refills: 0 | Status: SHIPPED | OUTPATIENT
Start: 2018-12-31 | End: 2019-01-10

## 2018-12-31 RX ORDER — FLUTICASONE PROPIONATE 50 MCG
2 SPRAY, SUSPENSION (ML) NASAL 2 TIMES DAILY
Qty: 1 BOTTLE | Refills: 0 | Status: SHIPPED | OUTPATIENT
Start: 2018-12-31 | End: 2019-04-23

## 2018-12-31 RX ORDER — IPRATROPIUM BROMIDE 0.5 MG/2.5ML
0.5 SOLUTION RESPIRATORY (INHALATION)
Status: COMPLETED | OUTPATIENT
Start: 2018-12-31 | End: 2018-12-31

## 2018-12-31 RX ORDER — DOXYCYCLINE 100 MG/1
100 CAPSULE ORAL 2 TIMES DAILY
Qty: 20 CAPSULE | Refills: 0 | Status: SHIPPED | OUTPATIENT
Start: 2018-12-31 | End: 2019-01-10

## 2018-12-31 RX ORDER — CETIRIZINE HYDROCHLORIDE 10 MG/1
10 TABLET ORAL DAILY
Qty: 30 TABLET | Refills: 2 | Status: SHIPPED | OUTPATIENT
Start: 2018-12-31 | End: 2022-05-06

## 2018-12-31 RX ORDER — ALBUTEROL SULFATE 0.83 MG/ML
2.5 SOLUTION RESPIRATORY (INHALATION) EVERY 6 HOURS PRN
Qty: 1 BOX | Refills: 0 | Status: SHIPPED | OUTPATIENT
Start: 2018-12-31 | End: 2019-04-23

## 2018-12-31 RX ORDER — PREDNISONE 20 MG/1
20 TABLET ORAL 2 TIMES DAILY
Qty: 10 TABLET | Refills: 0 | Status: SHIPPED | OUTPATIENT
Start: 2018-12-31 | End: 2019-01-05

## 2018-12-31 RX ORDER — ALBUTEROL SULFATE 0.83 MG/ML
2.5 SOLUTION RESPIRATORY (INHALATION)
Status: COMPLETED | OUTPATIENT
Start: 2018-12-31 | End: 2018-12-31

## 2018-12-31 RX ORDER — OMEPRAZOLE 20 MG/1
20 CAPSULE, DELAYED RELEASE ORAL DAILY
COMMUNITY
End: 2019-04-23

## 2018-12-31 RX ADMIN — IPRATROPIUM BROMIDE 0.5 MG: 0.5 SOLUTION RESPIRATORY (INHALATION) at 03:12

## 2018-12-31 RX ADMIN — ALBUTEROL SULFATE 2.5 MG: 0.83 SOLUTION RESPIRATORY (INHALATION) at 03:12

## 2018-12-31 NOTE — PROGRESS NOTES
"Subjective:       Patient ID: Jeanette Evans is a 84 y.o. female.    Vitals:  height is 4' 8" (1.422 m) and weight is 71.5 kg (157 lb 9.6 oz). Her temperature is 98.1 °F (36.7 °C). Her blood pressure is 143/79 (abnormal) and her pulse is 87. Her respiration is 12 and oxygen saturation is 95%.     Chief Complaint: Cough    Cough   This is a new problem. The current episode started in the past 7 days. The problem has been gradually worsening. The problem occurs constantly. The cough is productive of sputum. Associated symptoms include headaches, myalgias, nasal congestion, postnasal drip and a sore throat. Pertinent negatives include no chest pain, chills, fever, rash or shortness of breath.       Constitution: Negative for chills, fatigue and fever.   HENT: Positive for postnasal drip, sinus pain and sore throat. Negative for congestion.    Neck: Negative for painful lymph nodes.   Cardiovascular: Negative for chest pain and leg swelling.   Eyes: Negative for double vision and blurred vision.   Respiratory: Positive for cough. Negative for shortness of breath.    Gastrointestinal: Negative for nausea, vomiting and diarrhea.   Genitourinary: Negative for dysuria, frequency, urgency and history of kidney stones.   Musculoskeletal: Positive for muscle ache. Negative for joint pain, joint swelling and muscle cramps.   Skin: Negative for color change, pale, rash and bruising.   Allergic/Immunologic: Negative for seasonal allergies.   Neurological: Positive for headaches. Negative for dizziness, history of vertigo, light-headedness and passing out.   Hematologic/Lymphatic: Negative for swollen lymph nodes.   Psychiatric/Behavioral: Negative for nervous/anxious, sleep disturbance and depression. The patient is not nervous/anxious.        Objective:      Physical Exam   Constitutional: She is oriented to person, place, and time. Vital signs are normal. She appears well-developed and well-nourished. She is cooperative. "   HENT:   Head: Normocephalic.   Right Ear: Hearing, external ear and ear canal normal. A middle ear effusion is present.   Left Ear: Hearing, external ear and ear canal normal. A middle ear effusion is present.   Nose: Mucosal edema and rhinorrhea present. Right sinus exhibits maxillary sinus tenderness. Left sinus exhibits maxillary sinus tenderness.   Mouth/Throat: Uvula is midline and mucous membranes are normal. Posterior oropharyngeal erythema present.   Eyes: Conjunctivae, EOM and lids are normal. Pupils are equal, round, and reactive to light.   Neck: Trachea normal, normal range of motion, full passive range of motion without pain and phonation normal. Neck supple.   Cardiovascular: Normal rate, regular rhythm, normal heart sounds, intact distal pulses and normal pulses.   Pulmonary/Chest: Effort normal and breath sounds normal.   Abdominal: Soft. Normal appearance, normal aorta and bowel sounds are normal. There is no tenderness.   Musculoskeletal: Normal range of motion.   Neurological: She is alert and oriented to person, place, and time. She has normal strength. GCS eye subscore is 4. GCS verbal subscore is 5. GCS motor subscore is 6.   Skin: Skin is warm, dry and intact. Capillary refill takes less than 2 seconds.   Psychiatric: She has a normal mood and affect. Her speech is normal and behavior is normal. Judgment and thought content normal. Cognition and memory are normal.       Assessment:       1. Bronchitis        Plan:         Bronchitis  -     albuterol nebulizer solution 2.5 mg  -     ipratropium 0.02 % nebulizer solution 0.5 mg    Other orders  -     albuterol (PROVENTIL) 2.5 mg /3 mL (0.083 %) nebulizer solution; Take 3 mLs (2.5 mg total) by nebulization every 6 (six) hours as needed for Wheezing. Rescue  Dispense: 1 Box; Refill: 0  -     albuterol (VENTOLIN HFA) 90 mcg/actuation inhaler; Inhale 2 puffs into the lungs every 6 (six) hours as needed for Wheezing. Rescue  Dispense: 18 g; Refill:  0  -     promethazine-dextromethorphan (PROMETHAZINE-DM) 6.25-15 mg/5 mL Syrp; Take 5 mLs by mouth every 4 (four) hours as needed.  Dispense: 240 mL; Refill: 0  -     predniSONE (DELTASONE) 20 MG tablet; Take 1 tablet (20 mg total) by mouth 2 (two) times daily. for 5 days  Dispense: 10 tablet; Refill: 0  -     fluticasone (FLONASE) 50 mcg/actuation nasal spray; 2 sprays (100 mcg total) by Each Nare route 2 (two) times daily.  Dispense: 1 Bottle; Refill: 0  -     cetirizine (ZYRTEC) 10 MG tablet; Take 1 tablet (10 mg total) by mouth once daily.  Dispense: 30 tablet; Refill: 2  -     doxycycline (VIBRAMYCIN) 100 MG Cap; Take 1 capsule (100 mg total) by mouth 2 (two) times daily. for 10 days  Dispense: 20 capsule; Refill: 0

## 2018-12-31 NOTE — PATIENT INSTRUCTIONS
What Is Acute Bronchitis?  Acute bronchitis is when the airways in your lungs (bronchial tubes) become red and swollen (inflamed). It is usually caused by a viral infection. But it can also occur because of a bacteria or allergen. Symptoms include a cough that produces yellow or greenish mucus and can last for days or sometimes weeks.  Inside healthy lungs    Air travels in and out of the lungs through the airways. The linings of these airways produce sticky mucus. This mucus traps particles that enter the lungs. Tiny structures called cilia then sweep the particles out of the airways.     Healthy airway: Airways are normally open. Air moves in and out easily.      Healthy cilia: Tiny, hairlike cilia sweep mucus and particles up and out of the airways.   Lungs with bronchitis  Bronchitis often occurs with a cold or the flu virus. The airways become inflamed (red and swollen). There is a deep hacking cough from the extra mucus. Other symptoms may include:  · Wheezing  · Chest discomfort  · Shortness of breath  · Mild fever  A second infection, this time due to bacteria, may then occur. And airways irritated by allergens or smoke are more likely to get infected.        Inflamed airway: Inflammation and extra mucus narrow the airway, causing shortness of breath.      Impaired cilia: Extra mucus impairs cilia, causing congestion and wheezing. Smoking makes the problem worse.   Making a diagnosis  A physical exam, health history, and certain tests help your healthcare provider make the diagnosis.  Health history  Your healthcare provider will ask you about your symptoms.  The exam  Your provider listens to your chest for signs of congestion. He or she may also check your ears, nose, and throat.  Possible tests  · A sputum test for bacteria. This requires a sample of mucus from your lungs.  · A nasal or throat swab. This tests to see if you have a bacterial infection.  · A chest X-ray. This is done if your healthcare  provider thinks you have pneumonia.  · Tests to check for an underlying condition. Other tests may be done to check for things such as allergies, asthma, or COPD (chronic obstructive pulmonary disease). You may need to see a specialist for more lung function testing.  Treating a cough  The main treatment for bronchitis is easing symptoms. Avoiding smoke, allergens, and other things that trigger coughing can often help. If the infection is bacterial, you may be given antibiotics. During the illness, it's important to get plenty of sleep. To ease symptoms:  · Dont smoke. Also avoid secondhand smoke.  · Use a humidifier. Or try breathing in steam from a hot shower. This may help loosen mucus.  · Drink a lot of water and juice. They can soothe the throat and may help thin mucus.  · Sit up or use extra pillows when in bed. This helps to lessen coughing and congestion.  · Ask your provider about using medicine. Ask about using cough medicine, pain and fever medicine, or a decongestant.  Antibiotics  Most cases of bronchitis are caused by cold or flu viruses. They dont need antibiotics to treat them, even if your mucus is thick and green or yellow. Antibiotics dont treat viral illness and antibiotics have not been shown to have any benefit in cases of acute bronchitis. Taking antibiotics when they are not needed increases your risk of getting an infection later that is antibiotic-resistant. Antibiotics can also cause severe cases of diarrhea that require other antibiotics to treat.  It is important that you accept your healthcare provider's opinion to not use antibiotics. Your provider will prescribe antibiotics if the infection is caused by bacteria. If they are prescribed:  · Take all of the medicine. Take the medicine until it is used up, even if symptoms have improved. If you dont, the bronchitis may come back.  · Take the medicines as directed. For instance, some medicines should be taken with food.  · Ask about  side effects. Ask your provider or pharmacist what side effects are common, and what to do about them.  Follow-up care  You should see your provider again in 2 to 3 weeks. By this time, symptoms should have improved. An infection that lasts longer may mean you have a more serious problem.  Prevention  · Avoid tobacco smoke. If you smoke, quit. Stay away from smoky places. Ask friends and family not to smoke around you, or in your home or car.  · Get checked for allergies.  · Ask your provider about getting a yearly flu shot. Also ask about pneumococcal or pneumonia shots.  · Wash your hands often. This helps reduce the chance of picking up viruses that cause colds and flu.  Call your healthcare provider if:  · Symptoms worsen, or you have new symptoms  · Breathing problems worsen or  become severe  · Symptoms dont get better within a week, or within 3 days of taking antibiotics   Date Last Reviewed: 2/1/2017  © 7457-7702 [employer]. [address] [place] [address], [place] [address]. All rights reserved. This information is not intended as a substitute for professional medical care. Always follow your healthcare professional's instructions.

## 2019-01-13 ENCOUNTER — CLINICAL SUPPORT (OUTPATIENT)
Dept: URGENT CARE | Facility: CLINIC | Age: 84
End: 2019-01-13
Payer: MEDICARE

## 2019-01-13 VITALS
WEIGHT: 153 LBS | OXYGEN SATURATION: 99 % | HEIGHT: 60 IN | SYSTOLIC BLOOD PRESSURE: 131 MMHG | DIASTOLIC BLOOD PRESSURE: 63 MMHG | TEMPERATURE: 98 F | BODY MASS INDEX: 30.04 KG/M2 | HEART RATE: 83 BPM | RESPIRATION RATE: 16 BRPM

## 2019-01-13 DIAGNOSIS — J40 BRONCHITIS: Primary | ICD-10-CM

## 2019-01-13 PROCEDURE — 99214 PR OFFICE/OUTPT VISIT, EST, LEVL IV, 30-39 MIN: ICD-10-PCS | Mod: S$GLB,,, | Performed by: NURSE PRACTITIONER

## 2019-01-13 PROCEDURE — 99214 OFFICE O/P EST MOD 30 MIN: CPT | Mod: S$GLB,,, | Performed by: NURSE PRACTITIONER

## 2019-01-13 RX ORDER — BENZONATATE 100 MG/1
200 CAPSULE ORAL 3 TIMES DAILY PRN
Qty: 30 CAPSULE | Refills: 1 | Status: SHIPPED | OUTPATIENT
Start: 2019-01-13 | End: 2020-01-13

## 2019-01-13 NOTE — PROGRESS NOTES
Subjective:       Patient ID: Jeanette Evans is a 84 y.o. female.    Vitals:  height is 5' (1.524 m) and weight is 69.4 kg (153 lb). Her oral temperature is 98.3 °F (36.8 °C). Her blood pressure is 131/63 and her pulse is 83. Her respiration is 16 and oxygen saturation is 99%.     Chief Complaint: Cough and Diarrhea    Pt presents with cough, nonproductive x 2 days with assoc nasal drainage. Pt denies f/c/n/v. Pt recently tx for bronchitis with doxy and steroids 2 weeks ago. Pt states her symptoms resolved after getting tx but over the past 2 days PND and cough have developed. Pt also having diarrhea x 2 days. No abd pain, no melena or rectal bleeding.       Cough   This is a new problem. The current episode started 1 to 4 weeks ago. The problem has been gradually worsening. The problem occurs constantly. The cough is productive of sputum. Associated symptoms include nasal congestion and postnasal drip. Pertinent negatives include no chills, ear pain, eye redness, fever, hemoptysis, myalgias, rash, sore throat, shortness of breath or wheezing. Nothing aggravates the symptoms. She has tried OTC cough suppressant, OTC inhaler and prescription cough suppressant for the symptoms. The treatment provided no relief.   Diarrhea    This is a new problem. The current episode started 1 to 4 weeks ago. The problem has been gradually worsening. The stool consistency is described as watery. The patient states that diarrhea does not awaken her from sleep. Associated symptoms include coughing. Pertinent negatives include no chills, fever, myalgias or vomiting. Nothing aggravates the symptoms. Risk factors include recent antibiotic use. She has tried nothing for the symptoms.       Constitution: Negative for chills, sweating, fatigue and fever.   HENT: Positive for congestion and postnasal drip. Negative for ear pain, sinus pain, sinus pressure, sore throat and voice change.    Neck: Negative for painful lymph nodes.   Eyes:  Negative for eye redness.   Respiratory: Positive for cough. Negative for chest tightness, sputum production, bloody sputum, COPD, shortness of breath, stridor, wheezing and asthma.    Gastrointestinal: Positive for diarrhea. Negative for nausea and vomiting.   Musculoskeletal: Negative for muscle ache.   Skin: Negative for rash.   Allergic/Immunologic: Negative for seasonal allergies and asthma.   Hematologic/Lymphatic: Negative for swollen lymph nodes.       Objective:      Physical Exam   Constitutional: She is oriented to person, place, and time. She appears well-developed and well-nourished. She is cooperative.  Non-toxic appearance. She does not appear ill. No distress.   HENT:   Head: Normocephalic and atraumatic.   Right Ear: Hearing, tympanic membrane, external ear and ear canal normal.   Left Ear: Hearing, tympanic membrane, external ear and ear canal normal.   Nose: Nose normal. No mucosal edema, rhinorrhea or nasal deformity. No epistaxis. Right sinus exhibits no maxillary sinus tenderness and no frontal sinus tenderness. Left sinus exhibits no maxillary sinus tenderness and no frontal sinus tenderness.   Mouth/Throat: Uvula is midline, oropharynx is clear and moist and mucous membranes are normal. No trismus in the jaw. Normal dentition. No uvula swelling. No posterior oropharyngeal erythema.   Eyes: Conjunctivae and lids are normal. No scleral icterus.   Sclera clear bilat   Neck: Trachea normal, full passive range of motion without pain and phonation normal. Neck supple.   Cardiovascular: Normal rate, regular rhythm, normal heart sounds, intact distal pulses and normal pulses.   Pulmonary/Chest: Effort normal and breath sounds normal. No respiratory distress.   Abdominal: Soft. Normal appearance and bowel sounds are normal. She exhibits no distension. There is no tenderness.   Musculoskeletal: Normal range of motion. She exhibits no edema or deformity.   Neurological: She is alert and oriented to  person, place, and time. She exhibits normal muscle tone. Coordination normal.   Skin: Skin is warm, dry and intact. She is not diaphoretic. No pallor.   Psychiatric: She has a normal mood and affect. Her speech is normal and behavior is normal. Judgment and thought content normal. Cognition and memory are normal.   Nursing note and vitals reviewed.      Assessment:       1. Bronchitis        Plan:         Bronchitis    Other orders  -     benzonatate (TESSALON PERLES) 100 MG capsule; Take 2 capsules (200 mg total) by mouth 3 (three) times daily as needed for Cough.  Dispense: 30 capsule; Refill: 1       pt has albuterol nebs at home. Advised to do albuterol neb q4-6hrs for cough and start zyrtec daily. Pt advised to cont flonase and drink adequate fluids.

## 2019-01-13 NOTE — PATIENT INSTRUCTIONS
¿Qué es la bronquitis aguda?     La bronquitis aguda o de corta duración dura dìas o semanas. Se produce cuando los bronquios (vías respiratorias situadas en los pulmones) se irritan a causa de virus, bacterias o alergenos. Esta irritación genera kalin tos aguda (de corta duración) o crónica (de larga duración o recurrente) que produce flema amarilla o verdosa.   El interior de unos pulmones sanos    El aire entra y sale de los pulmones a través de las vías respiratorias. El revestimiento de las vías respiratorias produce flema, kalin sustancia pegajosa que atrapa las partículas que entran en los pulmones. Unas diminutas estructuras llamadas cilios se encargan de barrer las partículas para expulsarlas de las vías respiratorias.                                Vía respiratoria darrell: Las vías respiratorias normalmente están abiertas y alcira entrar y salir el aire fácilmente.      Cilios sanos: Los diminutos cilios, que parecen pelos, barren la flema y las partículas hacia arriba para expulsarlas de las vías respiratorias.   Pulmones con bronquitis  La bronquitis suele producirse cuando kalin persona tiene un resfriado o gripe. Las vías respiratorias se inflaman (enrojecen y se hinchan) y se forma un exceso de flema, lo que desencadena kalin tos profunda y perruna. Otros síntomas pueden incluir:  · sibilancias o un karina de silbido al respirar  · molestia en el pecho  · dificultad para respirar  · fiebre baja  En estas circunstancias puede producirse kalin segunda infección, esta vez de origen bacteriano. Las vías respiratorias irritadas por alergenos o el humo son más propensas a infectarse.     Vía respiratoria inflamada: La inflamación y el exceso de flema estrechan la vía respiratoria y provocan falta de aliento.      Cilios deteriorados: El exceso de flema deteriora los cilios y provoca congestión y sibilancias (silbidos al respirar). El cigarrillo empeora el problema.                                 Cómo se  diagnostica  Un chequeo, preguntas sobre sarah antecedentes de zohreh y ciertas pruebas ayudan a torrez proveedor de atención médica a llegar a un diagnóstico.  Antecedentes de zohreh  Torrez proveedor de atención médica le hará preguntas sobre sarah síntomas.  El chequeo  Torrez proveedor le escuchará el pecho para rich si está congestionado, y quizás le revise también sarah oídos, torrez nariz y torrez garganta.  Posibles pruebas  · Kalin prueba de esputo para detectar bacterias; requiere kalin muestra de flema expulsada de los pulmones.  · Un exudado nasal o faríngeo (de la garganta) para detectar el virus de la gripe.  · Kalin radiografía de tórax, si torrez proveedor de atención médica sospecha que usted tiene neumonía.  · Pruebas para detectar enfermedades que podrían causar bronquitis, galindo alergias, asma o EPOC. Quizás lo remitan a un especialista para estas pruebas.  Tratamiento de la tos  El tratamiento principal de la bronquitis consiste en aliviar los síntomas. Evitar el humo, los alergenos y demás factores que desencadenan la tos suele mejorar la bronquitis. Si la infección es de origen bacteriano, podrían usarse antibióticos. Chato la enfermedad, es importante y dormir mucho. Para aliviar los síntomas:  · No fume y evite el humo de segunda mano.  · Use un humidificador o inhale vapor de kalin ducha caliente para ayudar a aflojar la flema.  · Yomaira mucha agua y jugos, porque pueden calmar la irritación de la garganta y ayudar a diluir la flema.  · Siéntese o use almohadas adicionales cuando esté en la cama para aliviar la tos y la congestión.  · Pregunte a torrez proveedor sobre el uso de medicamentos para la tos, el dolor y la fiebre, o un descongestionante.  Antibióticos  La mayoría de los casos de bronquitis son causados por virus del resfriado o la gripe. Los antibióticos no tratan las enfermedades virales, y tomarlos cuando no son necesarios podría fomentar la producción de bacterias que son más difíciles de matar. Torrez proveedor le recetará  antibióticos si la causa de la infección fueron bacterias. Si se los recetan:  · [place] sarah antibióticos hasta que se le terminen, aunque le hayan jeremie los síntomas. Si no lo hace, la bronquitis podría reaparecer.  · [place] estos medicamentos según las indicaciones. Por ejemplo, algunos medicamentos deben tomarse con la comida.  · Consulte con torrez proveedor o farmacéutico para averiguar los efectos secundarios frecuentes y lo que debe hacer si se le presentan.  Visita de control  Debe visitar de nuevo a torrez proveedor en 2-3 semanas; para elizabeth momento sarah síntomas deberían rosi jeremie. Kalin infección que dura más tiempo podría ser señal de que existe un problema más grave.  Prevención  · Evite el humo del tabaco. Si fuma, abandone el hábito. Aléjese de los ambientes llenos de humo. Pida a sarah amigos y familiares que no fumen en sarah alrededores, ni en torrez hogar o torrez berna.  · Hágase pruebas para detectar alergias.  · Consulte con torrez proveedor sobre la posibilidad de ponerse kalin vacuna antigripal (flu shot) todos los años, y probablemente también la antineumocócica.  · Lave sarah lawrence a menudo, para tratar de reducir la posibilidad de contagiarse con virus que causan resfriado y gripe.     Llame a torrez proveedor de atención médica si:  · Le empeoran los síntomas o le aparecen unos nuevos.  · Los problemas respiratorios se vuelven graves.  · Los síntomas no le mejoran en un plazo de kalin semana, o al cabo de 3 días de estar tomando antibióticos.   Date Last Reviewed: 6/18/2014  © 8651-5647 [employer]. [address] [place], [place] [address], [place] [address]. Todos los derechos reservados. Esta información no pretende sustituir la atención médica profesional. Sólo torrez médico puede diagnosticar y tratar un problema de zohreh.

## 2019-02-21 ENCOUNTER — PROCEDURE VISIT (OUTPATIENT)
Dept: OPHTHALMOLOGY | Facility: CLINIC | Age: 84
End: 2019-02-21
Payer: MEDICARE

## 2019-02-21 VITALS — HEART RATE: 78 BPM | SYSTOLIC BLOOD PRESSURE: 148 MMHG | DIASTOLIC BLOOD PRESSURE: 64 MMHG

## 2019-02-21 DIAGNOSIS — H35.3221 EXUDATIVE AGE-RELATED MACULAR DEGENERATION OF LEFT EYE WITH ACTIVE CHOROIDAL NEOVASCULARIZATION: Primary | ICD-10-CM

## 2019-02-21 DIAGNOSIS — H35.3212 EXUDATIVE AGE-RELATED MACULAR DEGENERATION OF RIGHT EYE WITH INACTIVE CHOROIDAL NEOVASCULARIZATION: ICD-10-CM

## 2019-02-21 DIAGNOSIS — H35.033 HYPERTENSIVE RETINOPATHY, BILATERAL: ICD-10-CM

## 2019-02-21 PROCEDURE — 92014 COMPRE OPH EXAM EST PT 1/>: CPT | Mod: S$GLB,,, | Performed by: OPHTHALMOLOGY

## 2019-02-21 PROCEDURE — 92226 PR SPECIAL EYE EXAM, SUBSEQUENT: CPT | Mod: LT,S$GLB,, | Performed by: OPHTHALMOLOGY

## 2019-02-21 PROCEDURE — 92014 PR EYE EXAM, EST PATIENT,COMPREHESV: ICD-10-PCS | Mod: S$GLB,,, | Performed by: OPHTHALMOLOGY

## 2019-02-21 PROCEDURE — 92134 POSTERIOR SEGMENT OCT RETINA (OCULAR COHERENCE TOMOGRAPHY)-BOTH EYES: ICD-10-PCS | Mod: S$GLB,,, | Performed by: OPHTHALMOLOGY

## 2019-02-21 PROCEDURE — 92134 CPTRZ OPH DX IMG PST SGM RTA: CPT | Mod: S$GLB,,, | Performed by: OPHTHALMOLOGY

## 2019-02-21 PROCEDURE — 92226 PR SPECIAL EYE EXAM, SUBSEQUENT: ICD-10-PCS | Mod: LT,S$GLB,, | Performed by: OPHTHALMOLOGY

## 2019-02-21 NOTE — PROGRESS NOTES
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 HTN REt OU    3. NS OD - no Sx because of #1  PCIOL OS        6 months OCT

## 2019-04-23 ENCOUNTER — OFFICE VISIT (OUTPATIENT)
Dept: PULMONOLOGY | Facility: CLINIC | Age: 84
End: 2019-04-23
Payer: MEDICARE

## 2019-04-23 ENCOUNTER — HOSPITAL ENCOUNTER (OUTPATIENT)
Dept: RADIOLOGY | Facility: CLINIC | Age: 84
Discharge: HOME | End: 2019-04-23
Attending: INTERNAL MEDICINE
Payer: MEDICARE

## 2019-04-23 ENCOUNTER — TELEPHONE (OUTPATIENT)
Dept: PULMONOLOGY | Facility: CLINIC | Age: 84
End: 2019-04-23

## 2019-04-23 VITALS
HEIGHT: 60 IN | HEART RATE: 73 BPM | WEIGHT: 153.44 LBS | BODY MASS INDEX: 30.12 KG/M2 | OXYGEN SATURATION: 98 % | DIASTOLIC BLOOD PRESSURE: 65 MMHG | SYSTOLIC BLOOD PRESSURE: 144 MMHG

## 2019-04-23 DIAGNOSIS — R06.09 DOE (DYSPNEA ON EXERTION): ICD-10-CM

## 2019-04-23 DIAGNOSIS — J45.50 SEVERE PERSISTENT ASTHMA WITHOUT COMPLICATION: Primary | ICD-10-CM

## 2019-04-23 DIAGNOSIS — R07.89 RIGHT-SIDED CHEST WALL PAIN: ICD-10-CM

## 2019-04-23 DIAGNOSIS — J45.50 SEVERE PERSISTENT ASTHMA WITHOUT COMPLICATION: ICD-10-CM

## 2019-04-23 DIAGNOSIS — R09.89 CHRONIC SINUS COMPLAINTS: ICD-10-CM

## 2019-04-23 PROCEDURE — 71046 XR CHEST PA AND LATERAL: ICD-10-PCS | Mod: 26,,, | Performed by: RADIOLOGY

## 2019-04-23 PROCEDURE — 99205 PR OFFICE/OUTPT VISIT, NEW, LEVL V, 60-74 MIN: ICD-10-PCS | Mod: S$GLB,,, | Performed by: INTERNAL MEDICINE

## 2019-04-23 PROCEDURE — 1101F PR PT FALLS ASSESS DOC 0-1 FALLS W/OUT INJ PAST YR: ICD-10-PCS | Mod: CPTII,S$GLB,, | Performed by: INTERNAL MEDICINE

## 2019-04-23 PROCEDURE — 99205 OFFICE O/P NEW HI 60 MIN: CPT | Mod: S$GLB,,, | Performed by: INTERNAL MEDICINE

## 2019-04-23 PROCEDURE — 1101F PT FALLS ASSESS-DOCD LE1/YR: CPT | Mod: CPTII,S$GLB,, | Performed by: INTERNAL MEDICINE

## 2019-04-23 PROCEDURE — 99999 PR PBB SHADOW E&M-EST. PATIENT-LVL IV: CPT | Mod: PBBFAC,,, | Performed by: INTERNAL MEDICINE

## 2019-04-23 PROCEDURE — 71046 X-RAY EXAM CHEST 2 VIEWS: CPT | Mod: TC,FY,PO

## 2019-04-23 PROCEDURE — 99999 PR PBB SHADOW E&M-EST. PATIENT-LVL IV: ICD-10-PCS | Mod: PBBFAC,,, | Performed by: INTERNAL MEDICINE

## 2019-04-23 PROCEDURE — 71046 X-RAY EXAM CHEST 2 VIEWS: CPT | Mod: 26,,, | Performed by: RADIOLOGY

## 2019-04-23 RX ORDER — PREDNISONE 20 MG/1
TABLET ORAL
Qty: 12 TABLET | Refills: 0 | Status: SHIPPED | OUTPATIENT
Start: 2019-04-23 | End: 2019-07-19 | Stop reason: ALTCHOICE

## 2019-04-23 RX ORDER — ALBUTEROL SULFATE 0.83 MG/ML
2.5 SOLUTION RESPIRATORY (INHALATION) EVERY 6 HOURS PRN
Qty: 120 EACH | Refills: 11 | Status: SHIPPED | OUTPATIENT
Start: 2019-04-23 | End: 2020-02-26 | Stop reason: SDUPTHER

## 2019-04-23 RX ORDER — AZITHROMYCIN 500 MG/1
TABLET, FILM COATED ORAL
Qty: 3 TABLET | Refills: 3 | Status: SHIPPED | OUTPATIENT
Start: 2019-04-23 | End: 2019-07-19 | Stop reason: ALTCHOICE

## 2019-04-23 RX ORDER — FLUTICASONE PROPIONATE 50 MCG
1 SPRAY, SUSPENSION (ML) NASAL DAILY
Qty: 1 BOTTLE | Refills: 11 | Status: ON HOLD | OUTPATIENT
Start: 2019-04-23 | End: 2022-05-06 | Stop reason: HOSPADM

## 2019-04-23 RX ORDER — ALBUTEROL SULFATE 90 UG/1
AEROSOL, METERED RESPIRATORY (INHALATION)
Qty: 1 INHALER | Refills: 11 | Status: SHIPPED | OUTPATIENT
Start: 2019-04-23 | End: 2019-09-25 | Stop reason: SDUPTHER

## 2019-04-23 RX ORDER — MONTELUKAST SODIUM 10 MG/1
10 TABLET ORAL NIGHTLY
Qty: 30 TABLET | Refills: 11 | Status: SHIPPED | OUTPATIENT
Start: 2019-04-23 | End: 2019-09-25 | Stop reason: SDUPTHER

## 2019-04-23 RX ORDER — FLUTICASONE FUROATE AND VILANTEROL 200; 25 UG/1; UG/1
1 POWDER RESPIRATORY (INHALATION) DAILY
Qty: 1 EACH | Refills: 5 | Status: SHIPPED | OUTPATIENT
Start: 2019-04-23 | End: 2019-09-25 | Stop reason: SDUPTHER

## 2019-04-23 NOTE — TELEPHONE ENCOUNTER
Notified patient's of results, verbalized understanding.  Asked if Flonase can be switched to a different medication due to insurance not covering it. **contacted patient's pharmacy, they ran Flonase again and prescription is covered.  Calling to let daughter know.  No further action is needed at this time.**----- Message from Bg Kern MD sent at 4/23/2019  4:45 PM CDT -----  Notify cxr good

## 2019-04-23 NOTE — LETTER
April 23, 2019      Mervin Killian MD  [address]  [place] [address]           [facility]  [address]  [place] [address]  Phone: [phone]  Fax: [phone]          Patient: Jeanette Evans   MR Number: [id number]   YOB: 1934   Date of Visit: 4/23/2019       Dear Dr. Mervin Killian:    Thank you for referring Jeanette Evans to me for evaluation. Attached you will find relevant portions of my assessment and plan of care.    If you have questions, please do not hesitate to call me. I look forward to following Jeanette Evans along with you.    Sincerely,    Bg Kern MD    Enclosure  CC:  No Recipients    If you would like to receive this communication electronically, please contact [email or web address] or [phone] to request more information on [employer] access.    For providers and/or their staff who would like to refer a patient to [facility], please contact us through our one-stop-shop provider referral line, [facility], at [phone].    If you feel you have received this communication in error or would no longer like to receive these types of communications, please e-mail [email or web address]

## 2019-04-23 NOTE — PATIENT INSTRUCTIONS
Chronic sinus- use Singulair daily and use flonase 1-2 daily if drainage    Persistent asthma - severe-- breathing will worsen with asthma and improve.  Should be normal between.  Common for chronic disease to be under appreciated.     Preventive- breo once daily and singulair daily   Rescue - use albuterol inhaler 1-3 as needed or nebulizer up to every 4 hours as needed   Action plan- if albuterol not working nor lasting, or having bad symptoms - take prednisone daily for 3 days - may repeat if not controlled.       - may use azithromycin if infection suspected - a pill daily for 3 days will last 10days    Best to treat asthma cough with asthma therapy.    Check chest xray with right side chest/upper abdominal pain ongoing last wk to years?    Check lung capacity on good day.  Seasonal use medications may be reasonable.

## 2019-04-23 NOTE — PROGRESS NOTES
"4/23/2019    Jeanette Evans  New Patient Consult    Chief Complaint   Patient presents with    Asthma    COPD       HPI: some english speaking from [place], with daughter, never smoker, functions with no limits, has macular degeneration.  Pt was .      Has cough and wheezes and sob is minimal.  Seasonal variations with worse in fall and cold weather.  Sees Dr Killian - no steroid shots, pt's daughter had asthma. Pt had no problems til adult life, worsening last 5 yrs.    Pt forgets to use symbicort, uses prn resp rx mainly.  Prednisone use maybe 3times doc visits last yr.  No er visits nor admits.      Pt chr sinus drip and headaches.  Clear mucous.  Smell is good.not clearly cause lung problems?      Vague intermittent right upper abd or lower chest wall pain off and on  Yrs.    The chief compliant  problem is new to me",   PFSH:  Past Medical History:   Diagnosis Date    Diabetes mellitus type II     GERD (gastroesophageal reflux disease)     Hypertension          Past Surgical History:   Procedure Laterality Date    CATARACT EXTRACTION      CHOLECYSTECTOMY      ULTRASOUND, UPPER GI TRACT, ENDOSCOPIC N/A 8/2/2013    Performed by John Paul Haro MD at [facility]O     Social History     Tobacco Use    Smoking status: Never Smoker   Substance Use Topics    Alcohol use: No    Drug use: Not on file     Family History   Problem Relation Age of Onset    Cancer Mother     Heart disease Father      Review of patient's allergies indicates:   Allergen Reactions    Pcn [penicillins] Anaphylaxis     Other reaction(s): Anaphylaxis       Performance Status:The patient's activity level is no limits with regular activity.      Review of Systems:  a review of eleven systems covering constitutional, Eye, HEENT, Psych, Respiratory, Cardiac, GI, , Musculoskeletal, Endocrine, Dermatologic was negative except for pertinent findings as listed ABOVE and below:   pertinent positive as above, rest " is good      Exam:Comprehensive exam done. BP (!) 144/65 (BP Location: Left arm, Patient Position: Sitting)   Pulse 73   Ht 5' (1.524 m)   Wt 69.6 kg (153 lb 7 oz)   LMP  (LMP Unknown)   SpO2 98% Comment: on room air  BMI 29.97 kg/m²   Exam included Vitals as listed, and patient's appearance and affect and alertness and mood, oral exam for yeast and hygiene and pharynx lesions and Mallapatti (M) score, neck with inspection for jvd and masses and thyroid abnormalities and lymph nodes (supraclavicular and infraclavicular nodes and axillary also examined and noted if abn), chest exam included symmetry and effort and fremitus and percussion and auscultation, cardiac exam included rhythm and gallops and murmur and rubs and jvd and edema, abdominal exam for mass and hepatosplenomegaly and tenderness and hernias and bowel sounds, Musculoskeletal exam with muscle tone and posture and mobility/gait and  strength, and skin for rashes and cyanosis and pallor and turgor, extremity for clubbing.  Findings were normal except for pertinent findings listed below:   M, chest is symmetric, no distress, normal percussion, normal fremitus and good normal breath sounds      Radiographs (ct chest and cxr) reviewed: view by direct vision  cxr 4/8 and 6/17 viewed left high dipahragm    Labs reviewed           PFT will be done and results to be reviewed       Plan:  Clinical impression is apparently straight forward and impression with management as below.     Jeanette was seen today for asthma and copd.    Diagnoses and all orders for this visit:    Severe persistent asthma without complication  -     X-Ray Chest PA And Lateral; Future  -     fluticasone-vilanterol (BREO ELLIPTA) 200-25 mcg/dose DsDv diskus inhaler; Inhale 1 puff into the lungs once daily. Controller  -     montelukast (SINGULAIR) 10 mg tablet; Take 1 tablet (10 mg total) by mouth every evening.  -     predniSONE (DELTASONE) 20 MG tablet; One daily for 3 days and  repeat for flare of lung symptoms as intructed  -     albuterol (PROVENTIL/VENTOLIN HFA) 90 mcg/actuation inhaler; 2 puffs every 4 hours as needed for cough, wheeze, or shortness of breath  -     Complete PFT with bronchodilator; Future  -     albuterol (PROVENTIL) 2.5 mg /3 mL (0.083 %) nebulizer solution; Take 3 mLs (2.5 mg total) by nebulization every 6 (six) hours as needed for Wheezing.    Chronic sinus complaints  -     X-Ray Chest PA And Lateral; Future  -     montelukast (SINGULAIR) 10 mg tablet; Take 1 tablet (10 mg total) by mouth every evening.  -     fluticasone (FLONASE) 50 mcg/actuation nasal spray; 1 spray (50 mcg total) by Each Nare route once daily.  -     azithromycin (ZITHROMAX) 500 MG tablet; One daily for yellow mucous, repeat if needed    FARMER (dyspnea on exertion)  -     X-Ray Chest PA And Lateral; Future  -     Complete PFT with bronchodilator; Future    Right-sided chest wall pain  -     X-Ray Chest PA And Lateral; Future        Follow up in about 5 months (around 9/23/2019).    Discussed with patient above for education the following:      Patient Instructions   Chronic sinus- use Singulair daily and use flonase 1-2 daily if drainage    Persistent asthma - severe-- breathing will worsen with asthma and improve.  Should be normal between.  Common for chronic disease to be under appreciated.     Preventive- breo once daily and singulair daily   Rescue - use albuterol inhaler 1-3 as needed or nebulizer up to every 4 hours as needed   Action plan- if albuterol not working nor lasting, or having bad symptoms - take prednisone daily for 3 days - may repeat if not controlled.       - may use azithromycin if infection suspected - a pill daily for 3 days will last 10days    Best to treat asthma cough with asthma therapy.    Check chest xray with right side chest/upper abdominal pain ongoing last wk to years?    Check lung capacity on good day.  Seasonal use medications may be reasonable.

## 2019-07-09 ENCOUNTER — HOSPITAL ENCOUNTER (OUTPATIENT)
Dept: RESPIRATORY THERAPY | Facility: HOSPITAL | Age: 84
Discharge: HOME | End: 2019-07-09
Attending: INTERNAL MEDICINE
Payer: MEDICARE

## 2019-07-09 DIAGNOSIS — R06.09 DOE (DYSPNEA ON EXERTION): ICD-10-CM

## 2019-07-09 DIAGNOSIS — J45.50 SEVERE PERSISTENT ASTHMA WITHOUT COMPLICATION: ICD-10-CM

## 2019-07-09 LAB
BRPFT: ABNORMAL
DLCO ADJ PRE: 14.86 ML/(MIN*MMHG) (ref 10.92–22.39)
DLCO SINGLE BREATH LLN: 10.92
DLCO SINGLE BREATH PRE REF: 89.2 %
DLCO SINGLE BREATH REF: 16.65
DLCOC SBVA LLN: 2.29
DLCOC SBVA PRE REF: 129.5 %
DLCOC SBVA REF: 3.93
DLCOC SINGLE BREATH LLN: 10.92
DLCOC SINGLE BREATH PRE REF: 89.2 %
DLCOC SINGLE BREATH REF: 16.65
DLCOVA LLN: 2.29
DLCOVA PRE REF: 129.5 %
DLCOVA PRE: 5.08 ML/(MIN*MMHG*L) (ref 2.29–5.56)
DLCOVA REF: 3.93
DLVAADJ PRE: 5.08 ML/(MIN*MMHG*L) (ref 2.29–5.56)
ERVN2 LLN: 0.39
ERVN2 PRE REF: 71.1 %
ERVN2 PRE: 0.28 L (ref 0.39–0.39)
ERVN2 REF: 0.39
FEF 25 75 CHG: 20.1 %
FEF 25 75 LLN: 0.52
FEF 25 75 POST REF: 115.8 %
FEF 25 75 PRE REF: 96.4 %
FEF 25 75 REF: 1.3
FET100 CHG: 3.4 %
FEV1 CHG: 2.8 %
FEV1 FVC CHG: 3.6 %
FEV1 FVC LLN: 62
FEV1 FVC POST REF: 103.1 %
FEV1 FVC PRE REF: 99.5 %
FEV1 FVC REF: 77
FEV1 LLN: 1.08
FEV1 POST REF: 88.1 %
FEV1 PRE REF: 85.7 %
FEV1 REF: 1.58
FRCN2 LLN: 1.67
FRCN2 PRE REF: 60.8 %
FRCN2 REF: 2.49
FVC CHG: -0.7 %
FVC LLN: 1.43
FVC POST REF: 84.3 %
FVC PRE REF: 84.9 %
FVC REF: 2.07
IVC PRE: 1.68 L (ref 1.43–2.72)
IVC SINGLE BREATH LLN: 1.43
IVC SINGLE BREATH PRE REF: 80.8 %
IVC SINGLE BREATH REF: 2.07
MVV LLN: 44
MVV PRE REF: 124.6 %
MVV REF: 52
PEF CHG: -8.8 %
PEF LLN: 1.58
PEF POST REF: 107.7 %
PEF PRE REF: 118.1 %
PEF REF: 3.34
POST FEF 25 75: 1.51 L/S (ref 0.52–2.09)
POST FET 100: 7.56 SEC
POST FEV1 FVC: 79.47 % (ref 61.69–92.48)
POST FEV1: 1.39 L (ref 1.08–2.07)
POST FVC: 1.75 L (ref 1.43–2.72)
POST PEF: 3.6 L/S (ref 1.58–5.09)
PRE DLCO: 14.86 ML/(MIN*MMHG) (ref 10.92–22.39)
PRE FEF 25 75: 1.26 L/S (ref 0.52–2.09)
PRE FET 100: 7.31 SEC
PRE FEV1 FVC: 76.73 % (ref 61.69–92.48)
PRE FEV1: 1.35 L (ref 1.08–2.07)
PRE FRC N2: 1.51 L
PRE FVC: 1.76 L (ref 1.43–2.72)
PRE MVV: 65 L/MIN (ref 44.34–59.98)
PRE PEF: 3.94 L/S (ref 1.58–5.09)
RVN2 LLN: 1.52
RVN2 PRE REF: 57.6 %
RVN2 PRE: 1.21 L (ref 1.52–2.67)
RVN2 REF: 2.1
RVN2TLCN2 LLN: 37.93
RVN2TLCN2 PRE REF: 85.6 %
RVN2TLCN2 PRE: 40.65 % (ref 37.93–57.11)
RVN2TLCN2 REF: 47.52
TLCN2 LLN: 3.25
TLCN2 PRE REF: 70 %
TLCN2 PRE: 2.97 L (ref 3.25–5.23)
TLCN2 REF: 4.24
VA PRE: 2.92 L (ref 4.09–4.09)
VA SINGLE BREATH LLN: 4.09
VA SINGLE BREATH PRE REF: 71.4 %
VA SINGLE BREATH REF: 4.09
VCMAXN2 LLN: 1.43
VCMAXN2 PRE REF: 84.9 %
VCMAXN2 PRE: 1.76 L (ref 1.43–2.72)
VCMAXN2 REF: 2.07

## 2019-07-09 PROCEDURE — 94727 PR PULM FUNCTION TEST BY GAS: ICD-10-PCS | Mod: 26,,, | Performed by: INTERNAL MEDICINE

## 2019-07-09 PROCEDURE — 94060 EVALUATION OF WHEEZING: CPT

## 2019-07-09 PROCEDURE — 94060 PR EVAL OF BRONCHOSPASM: ICD-10-PCS | Mod: 26,,, | Performed by: INTERNAL MEDICINE

## 2019-07-09 PROCEDURE — 94729 DIFFUSING CAPACITY: CPT | Mod: 26,,, | Performed by: INTERNAL MEDICINE

## 2019-07-09 PROCEDURE — 94729 DIFFUSING CAPACITY: CPT

## 2019-07-09 PROCEDURE — 94729 PR C02/MEMBANE DIFFUSE CAPACITY: ICD-10-PCS | Mod: 26,,, | Performed by: INTERNAL MEDICINE

## 2019-07-09 PROCEDURE — 94727 GAS DIL/WSHOT DETER LNG VOL: CPT

## 2019-07-09 PROCEDURE — 94060 EVALUATION OF WHEEZING: CPT | Mod: 26,,, | Performed by: INTERNAL MEDICINE

## 2019-07-09 PROCEDURE — 94727 GAS DIL/WSHOT DETER LNG VOL: CPT | Mod: 26,,, | Performed by: INTERNAL MEDICINE

## 2019-07-19 ENCOUNTER — OFFICE VISIT (OUTPATIENT)
Dept: PODIATRY | Facility: CLINIC | Age: 84
End: 2019-07-19
Payer: MEDICARE

## 2019-07-19 VITALS
BODY MASS INDEX: 30.04 KG/M2 | WEIGHT: 153 LBS | SYSTOLIC BLOOD PRESSURE: 124 MMHG | DIASTOLIC BLOOD PRESSURE: 59 MMHG | HEIGHT: 60 IN | HEART RATE: 72 BPM

## 2019-07-19 DIAGNOSIS — E11.42 DIABETIC POLYNEUROPATHY ASSOCIATED WITH TYPE 2 DIABETES MELLITUS: ICD-10-CM

## 2019-07-19 DIAGNOSIS — M79.675 TOE PAIN, BILATERAL: ICD-10-CM

## 2019-07-19 DIAGNOSIS — Z01.818 PRE-OP TESTING: ICD-10-CM

## 2019-07-19 DIAGNOSIS — B35.1 ONYCHOMYCOSIS DUE TO DERMATOPHYTE: Primary | ICD-10-CM

## 2019-07-19 DIAGNOSIS — M79.674 TOE PAIN, BILATERAL: ICD-10-CM

## 2019-07-19 DIAGNOSIS — B35.3 TINEA PEDIS OF BOTH FEET: ICD-10-CM

## 2019-07-19 PROCEDURE — 99213 OFFICE O/P EST LOW 20 MIN: CPT | Mod: S$GLB,,, | Performed by: PODIATRIST

## 2019-07-19 PROCEDURE — 99999 PR PBB SHADOW E&M-EST. PATIENT-LVL III: CPT | Mod: PBBFAC,,, | Performed by: PODIATRIST

## 2019-07-19 PROCEDURE — 1101F PT FALLS ASSESS-DOCD LE1/YR: CPT | Mod: CPTII,S$GLB,, | Performed by: PODIATRIST

## 2019-07-19 PROCEDURE — 99999 PR PBB SHADOW E&M-EST. PATIENT-LVL III: ICD-10-PCS | Mod: PBBFAC,,, | Performed by: PODIATRIST

## 2019-07-19 PROCEDURE — 1101F PR PT FALLS ASSESS DOC 0-1 FALLS W/OUT INJ PAST YR: ICD-10-PCS | Mod: CPTII,S$GLB,, | Performed by: PODIATRIST

## 2019-07-19 PROCEDURE — 99213 PR OFFICE/OUTPT VISIT, EST, LEVL III, 20-29 MIN: ICD-10-PCS | Mod: S$GLB,,, | Performed by: PODIATRIST

## 2019-07-19 RX ORDER — LANOLIN ALCOHOL/MO/W.PET/CERES
CREAM (GRAM) TOPICAL
COMMUNITY
Start: 2019-05-30

## 2019-07-19 RX ORDER — CICLOPIROX OLAMINE 7.7 MG/G
CREAM TOPICAL 2 TIMES DAILY
Qty: 90 G | Refills: 2 | Status: SHIPPED | OUTPATIENT
Start: 2019-07-19 | End: 2022-05-06

## 2019-07-19 NOTE — PROGRESS NOTES
Subjective:      Patient ID: Jeanette Evans is a 84 y.o. female.    Chief Complaint: Nail Problem (Pain in nails)    Jeanette is a 84 y.o. female who presents to the clinic for evaluation and treatment of high risk feet. Jeanette has a past medical history of Diabetes mellitus type II, GERD (gastroesophageal reflux disease), and Hypertension. The patient's chief complaint is long, thick toenails.  Gradual onset, worsening over past several weeks, aggravated by increased weight bearing, shoe gear, pressure.  Periodic debridement, penlac help symptoms.  This patient has documented high risk feet requiring routine maintenance secondary to diabetes mellitis and those secondary complications of diabetes, as mentioned..    CC2 itching flaking skin bottom both feet.  Gradual onset, worsening over past several weeks, aggravated by increased weight bearing, shoe gear, pressure.  No previous medical treatment.  OTC pain med not helping.  Denies signs infection.      PCP: Mervin Killian MD    Date Last Seen by PCP:   Chief Complaint   Patient presents with    Nail Problem     Pain in nails         Current shoe gear:  Affected Foot: Rx diabetic extra depth shoes and custom accommodative insoles     Unaffected Foot: Rx diabetic extra depth shoes and custom accommodative insoles < 1 year.    Hemoglobin A1C   Date Value Ref Range Status   04/06/2017 5.9 4.5 - 6.2 % Final     Comment:     According to ADA guidelines, hemoglobin A1C <7.0% represents  optimal control in non-pregnant diabetic patients.  Different  metrics may apply to specific populations.   Standards of Medical Care in Diabetes - 2016.  For the purpose of screening for the presence of diabetes:  <5.7%     Consistent with the absence of diabetes  5.7-6.4%  Consistent with increasing risk for diabetes   (prediabetes)  >or=6.5%  Consistent with diabetes  Currently no consensus exists for use of hemoglobin A1C  for diagnosis of diabetes for children.         Review of  Systems   Constitution: Negative for chills, diaphoresis, fever, malaise/fatigue and night sweats.   Cardiovascular: Negative for claudication, cyanosis, leg swelling and syncope.   Skin: Positive for dry skin, itching, nail changes and suspicious lesions. Negative for color change, rash and unusual hair distribution.   Musculoskeletal: Negative for falls, joint pain, joint swelling, muscle cramps, muscle weakness and stiffness.   Gastrointestinal: Negative for constipation, diarrhea, nausea and vomiting.   Neurological: Positive for sensory change. Negative for brief paralysis, disturbances in coordination, focal weakness, numbness, paresthesias and tremors.           Objective:      Physical Exam   Constitutional: She is oriented to person, place, and time. She appears well-developed and well-nourished. She is cooperative. No distress.   Cardiovascular:   Pulses:       Popliteal pulses are 2+ on the right side, and 2+ on the left side.        Dorsalis pedis pulses are 2+ on the right side, and 2+ on the left side.        Posterior tibial pulses are 1+ on the right side, and 1+ on the left side.   capillary refill 3-4 seconds all toes/distal feet, all toes/both feet warm to touch. Negavie lower extremity edema bilateral.     Musculoskeletal:   Decreased stride, station of gait.  moderately propulsive toe off.  Increased angle and base of gait.    All toes without clubbing, cyanosis, or signs of ischemia.    Range of motion, stability, muscle strength and tone age and health appropriate normal bilateral feet and legs.     Lymphadenopathy:        Right: No inguinal adenopathy present.        Left: No inguinal adenopathy present.   Negative lymphadenopathy bilateral popliteal fossa and tarsal tunnel.   Neurological: She is alert and oriented to person, place, and time. She has normal strength. She is not disoriented. She displays no atrophy and no tremor. A sensory deficit is present. She exhibits normal muscle tone.  Coordination and gait normal.   Reflex Scores:       Patellar reflexes are 2+ on the right side and 2+ on the left side.       Achilles reflexes are 2+ on the right side and 2+ on the left side.  Decreased/absent vibratory sensation bilateral feet to 128Hz tuning fork.    Diminished/loss of protective sensation all toes bilateral to 10 gram monofilament.         Skin: Skin is warm, dry and intact. No abrasion, no bruising, no burn, no ecchymosis, no laceration, no lesion and no rash noted. She is not diaphoretic. No cyanosis or erythema. No pallor. Nails show no clubbing.   Dry scale with superficial flakes over an erythematous base plantar feet bilateral without ulceration, drainage, pus, tracking, fluctuance, malodor, or cardinal signs infection.    Otheriwse, Skin is normal age and health appropriate color, turgor, texture, and temperature bilateral lower extremities without ulceration, hyperpigmentation, discoloration, masses nodules or cords palpated.  No ecchymosis, erythema, edema, or cardinal signs of infection bilateral lower extremities.        Toenails 1st, 2nd, 3rd, 4th, 5th  bilateral are hypertrophic thickened 2-3 mm, dystrophic, discolored tanish brown with tan, gray crumbly subungual debris.  Tender to distal nail plate pressure, without periungual skin abnormality of each.               Assessment:       Encounter Diagnoses   Name Primary?    Onychomycosis due to dermatophyte Yes    Toe pain, bilateral     Tinea pedis of both feet     Diabetic polyneuropathy associated with type 2 diabetes mellitus     Pre-op testing          Plan:       Jeanette was seen today for nail problem.    Diagnoses and all orders for this visit:    Onychomycosis due to dermatophyte    Toe pain, bilateral    Tinea pedis of both feet    Diabetic polyneuropathy associated with type 2 diabetes mellitus    Pre-op testing  -     US Lower Extrem Arteries Bilat with DIDIER; Future    Other orders  -     ciclopirox (LOPROX) 0.77 %  Crea; Apply topically 2 (two) times daily.      I counseled the patient on her conditions, their implications and medical management.        - Shoe inspection. Diabetic Foot Education. Patient reminded of the importance of good nutrition and blood sugar control to help prevent podiatric complications of diabetes. Patient instructed on proper foot hygeine. We discussed wearing proper shoe gear, daily foot inspections, never walking without protective shoe gear, never putting sharp instruments to feet, routine podiatric visits at least annually.      - With patient's permission, nails were aggressively reduced and debrided x 10 to their soft tissue attachment mechanically, removing all offending nail and debris. Patient relates relief following the procedure. She will continue to monitor the areas daily, inspect her feet, wear protective shoe gear when ambulatory, moisturizer to maintain skin integrity and follow in this office p.r.n.      Rx loprox cream bid.

## 2019-07-23 ENCOUNTER — HOSPITAL ENCOUNTER (OUTPATIENT)
Dept: RADIOLOGY | Facility: CLINIC | Age: 84
Discharge: HOME | End: 2019-07-23
Attending: PODIATRIST
Payer: MEDICARE

## 2019-07-23 ENCOUNTER — TELEPHONE (OUTPATIENT)
Dept: PODIATRY | Facility: CLINIC | Age: 84
End: 2019-07-23

## 2019-07-23 DIAGNOSIS — Z01.818 PRE-OP TESTING: ICD-10-CM

## 2019-07-23 PROCEDURE — 93922 UPR/L XTREMITY ART 2 LEVELS: CPT | Mod: TC,PO

## 2019-07-23 PROCEDURE — 93922 US ARTERIAL LOWER EXTREMITY BILAT WITH ABI (XPD): ICD-10-PCS | Mod: 26,,, | Performed by: RADIOLOGY

## 2019-07-23 PROCEDURE — 93922 UPR/L XTREMITY ART 2 LEVELS: CPT | Mod: 26,,, | Performed by: RADIOLOGY

## 2019-07-23 PROCEDURE — 93925 US ARTERIAL LOWER EXTREMITY BILAT WITH ABI (XPD): ICD-10-PCS | Mod: 26,,, | Performed by: RADIOLOGY

## 2019-07-23 PROCEDURE — 93925 LOWER EXTREMITY STUDY: CPT | Mod: 26,,, | Performed by: RADIOLOGY

## 2019-07-24 NOTE — TELEPHONE ENCOUNTER
----- Message from Apolinar Diego DPM sent at 7/23/2019  4:10 PM CDT -----  Please help patient schedule a consult with vascular surgery.  She cannot get the nail procedure until they clear her - she is in no present danger, but they will have to let us know she has a reasonable chance of healing before we do her procedure.

## 2019-07-30 ENCOUNTER — HOSPITAL ENCOUNTER (EMERGENCY)
Facility: HOSPITAL | Age: 84
Discharge: HOME | End: 2019-07-30
Attending: EMERGENCY MEDICINE
Payer: MEDICARE

## 2019-07-30 VITALS
BODY MASS INDEX: 32.75 KG/M2 | SYSTOLIC BLOOD PRESSURE: 158 MMHG | HEIGHT: 58 IN | DIASTOLIC BLOOD PRESSURE: 111 MMHG | RESPIRATION RATE: 16 BRPM | WEIGHT: 156 LBS | HEART RATE: 65 BPM | OXYGEN SATURATION: 98 % | TEMPERATURE: 98 F

## 2019-07-30 DIAGNOSIS — S09.90XA INJURY OF HEAD, INITIAL ENCOUNTER: Primary | ICD-10-CM

## 2019-07-30 PROCEDURE — 99284 EMERGENCY DEPT VISIT MOD MDM: CPT

## 2019-07-30 PROCEDURE — 25000003 PHARM REV CODE 250: Performed by: EMERGENCY MEDICINE

## 2019-07-30 RX ORDER — ACETAMINOPHEN 500 MG
1000 TABLET ORAL
Status: COMPLETED | OUTPATIENT
Start: 2019-07-30 | End: 2019-07-30

## 2019-07-30 RX ADMIN — ACETAMINOPHEN 1000 MG: 500 TABLET, FILM COATED ORAL at 10:07

## 2019-07-30 NOTE — ED PROVIDER NOTES
Encounter Date: 7/30/2019       History     Chief Complaint   Patient presents with    Head Injury     85-year-old female presented emergency department after a fall.  Patient said she slipped in the shower and hit the back of her head.  Patient complains of pain in that area of injury. Denies any headache other than the localized pain.  Denies nausea or vomiting or chest pain or shortness of breath.  Denies neck pain or loss of consciousness.  Denies any weakness or numbness.        Review of patient's allergies indicates:   Allergen Reactions    Pcn [penicillins] Anaphylaxis     Other reaction(s): Anaphylaxis     Past Medical History:   Diagnosis Date    Diabetes mellitus type II     GERD (gastroesophageal reflux disease)     Hypertension      Past Surgical History:   Procedure Laterality Date    CATARACT EXTRACTION      CHOLECYSTECTOMY      ULTRASOUND, UPPER GI TRACT, ENDOSCOPIC N/A 8/2/2013    Performed by John Paul Haro MD at [facility] ENDO     Family History   Problem Relation Age of Onset    Cancer Mother     Heart disease Father      Social History     Tobacco Use    Smoking status: Never Smoker   Substance Use Topics    Alcohol use: No    Drug use: Not on file     Review of Systems   Constitutional: Negative.    HENT: Negative for congestion, dental problem, ear pain, nosebleeds, sinus pain and voice change.    Eyes: Negative.    Respiratory: Negative.    Cardiovascular: Negative.    Gastrointestinal: Negative.    Endocrine: Negative.    Genitourinary: Negative.    Musculoskeletal: Negative.    Skin: Negative.    Allergic/Immunologic: Negative.    Neurological: Negative.         Head injury with a scalp contusion and pain and hematoma   Hematological: Negative.    Psychiatric/Behavioral: Negative.    All other systems reviewed and are negative.      Physical Exam     Initial Vitals [07/30/19 1020]   BP Pulse Resp Temp SpO2   (!) 158/111 65 16 97.9 °F (36.6 °C) 98 %      MAP       --          Physical Exam    Nursing note and vitals reviewed.  Constitutional: She appears well-developed and well-nourished. She is not diaphoretic. No distress.   HENT:   Head: Normocephalic.   Mouth/Throat: Oropharynx is clear and moist.   Left parietal scalp hematoma without tenderness and swelling   Eyes: Pupils are equal, round, and reactive to light. Right eye exhibits no discharge.   Macular degeneration of eye at baseline with decreased vision without any acute changes.  Pterygium noted bilaterally   Neck: Normal range of motion. Neck supple. Thyromegaly present. Tracheal deviation present.   Cardiovascular: Normal rate, regular rhythm, normal heart sounds and intact distal pulses.   Pulmonary/Chest: Breath sounds normal. No respiratory distress. She has no wheezes.   Abdominal: Soft. Bowel sounds are normal. She exhibits no distension. There is no tenderness.   Musculoskeletal: Normal range of motion. She exhibits no edema or tenderness.   Lymphadenopathy:     She has cervical adenopathy.   Neurological: She is alert and oriented to person, place, and time. She has normal strength. No cranial nerve deficit or sensory deficit.   Skin: Skin is warm and dry. Capillary refill takes less than 2 seconds. No erythema. No pallor.   Psychiatric: She has a normal mood and affect. Thought content normal.         ED Course   Procedures  Labs Reviewed - No data to display       Imaging Results          CT Head Without Contrast (In process)                                    ED Course as of Jul 30 1234   Tue Jul 30, 2019   1228 CT Head Without Contrast [UM]   1228 Head CT unremarkable. Patient does have evidence of minor head injury. Head injury instructions given and discharged home with instructions and follow-up    [UM]      ED Course User Index  [UM] Silvio Gama MD     Clinical Impression:       ICD-10-CM ICD-9-CM   1. Injury of head, initial encounter S09.90XA 959.01                                Silvio Gama MD  07/30/19  1234

## 2019-07-30 NOTE — ED TRIAGE NOTES
Daughter states pt fell in the shower this AM and hit the toilet. Daughter states pt denies LOC, dizziness, weakness, blurred vision.

## 2019-08-15 ENCOUNTER — OFFICE VISIT (OUTPATIENT)
Dept: OPHTHALMOLOGY | Facility: CLINIC | Age: 84
End: 2019-08-15
Payer: MEDICARE

## 2019-08-15 DIAGNOSIS — H35.3221 EXUDATIVE AGE-RELATED MACULAR DEGENERATION OF LEFT EYE WITH ACTIVE CHOROIDAL NEOVASCULARIZATION: ICD-10-CM

## 2019-08-15 DIAGNOSIS — H35.3212 EXUDATIVE AGE-RELATED MACULAR DEGENERATION OF RIGHT EYE WITH INACTIVE CHOROIDAL NEOVASCULARIZATION: Primary | ICD-10-CM

## 2019-08-15 DIAGNOSIS — H35.033 HYPERTENSIVE RETINOPATHY, BILATERAL: ICD-10-CM

## 2019-08-15 DIAGNOSIS — H43.823 VITREOMACULAR ADHESION, BILATERAL: ICD-10-CM

## 2019-08-15 PROCEDURE — 92134 POSTERIOR SEGMENT OCT RETINA (OCULAR COHERENCE TOMOGRAPHY)-BOTH EYES: ICD-10-PCS | Mod: S$GLB,,, | Performed by: OPHTHALMOLOGY

## 2019-08-15 PROCEDURE — 92134 CPTRZ OPH DX IMG PST SGM RTA: CPT | Mod: S$GLB,,, | Performed by: OPHTHALMOLOGY

## 2019-08-15 PROCEDURE — 92014 PR EYE EXAM, EST PATIENT,COMPREHESV: ICD-10-PCS | Mod: S$GLB,,, | Performed by: OPHTHALMOLOGY

## 2019-08-15 PROCEDURE — 99999 PR PBB SHADOW E&M-EST. PATIENT-LVL III: CPT | Mod: PBBFAC,,, | Performed by: OPHTHALMOLOGY

## 2019-08-15 PROCEDURE — 99999 PR PBB SHADOW E&M-EST. PATIENT-LVL III: ICD-10-PCS | Mod: PBBFAC,,, | Performed by: OPHTHALMOLOGY

## 2019-08-15 PROCEDURE — 92226 PR SPECIAL EYE EXAM, SUBSEQUENT: CPT | Mod: RT,S$GLB,, | Performed by: OPHTHALMOLOGY

## 2019-08-15 PROCEDURE — 92226 PR SPECIAL EYE EXAM, SUBSEQUENT: ICD-10-PCS | Mod: RT,S$GLB,, | Performed by: OPHTHALMOLOGY

## 2019-08-15 PROCEDURE — 92014 COMPRE OPH EXAM EST PT 1/>: CPT | Mod: S$GLB,,, | Performed by: OPHTHALMOLOGY

## 2019-08-15 NOTE — PROGRESS NOTES
HPI     DLS: 02/21/2019    Patient states her vision may have decreased since her last visit. She states even though her vision is not very good but the little she has, she has noticed changes. She notes some flashes but not extreme.       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 HTN REt OU    3. NS OD - no Sx because of #1  PCIOL OS        6 months OCT

## 2019-09-25 ENCOUNTER — OFFICE VISIT (OUTPATIENT)
Dept: PULMONOLOGY | Facility: CLINIC | Age: 84
End: 2019-09-25
Payer: MEDICARE

## 2019-09-25 VITALS
OXYGEN SATURATION: 100 % | HEIGHT: 58 IN | WEIGHT: 157.75 LBS | HEART RATE: 68 BPM | BODY MASS INDEX: 33.11 KG/M2 | DIASTOLIC BLOOD PRESSURE: 74 MMHG | SYSTOLIC BLOOD PRESSURE: 171 MMHG

## 2019-09-25 DIAGNOSIS — R09.89 CHRONIC SINUS COMPLAINTS: ICD-10-CM

## 2019-09-25 DIAGNOSIS — J45.50 SEVERE PERSISTENT ASTHMA WITHOUT COMPLICATION: ICD-10-CM

## 2019-09-25 PROCEDURE — 99213 OFFICE O/P EST LOW 20 MIN: CPT | Mod: S$GLB,,, | Performed by: INTERNAL MEDICINE

## 2019-09-25 PROCEDURE — 99999 PR PBB SHADOW E&M-EST. PATIENT-LVL IV: CPT | Mod: PBBFAC,,, | Performed by: INTERNAL MEDICINE

## 2019-09-25 PROCEDURE — 99213 PR OFFICE/OUTPT VISIT, EST, LEVL III, 20-29 MIN: ICD-10-PCS | Mod: S$GLB,,, | Performed by: INTERNAL MEDICINE

## 2019-09-25 PROCEDURE — 1100F PTFALLS ASSESS-DOCD GE2>/YR: CPT | Mod: CPTII,S$GLB,, | Performed by: INTERNAL MEDICINE

## 2019-09-25 PROCEDURE — 99999 PR PBB SHADOW E&M-EST. PATIENT-LVL IV: ICD-10-PCS | Mod: PBBFAC,,, | Performed by: INTERNAL MEDICINE

## 2019-09-25 PROCEDURE — 3288F FALL RISK ASSESSMENT DOCD: CPT | Mod: CPTII,S$GLB,, | Performed by: INTERNAL MEDICINE

## 2019-09-25 PROCEDURE — 1100F PR PT FALLS ASSESS DOC 2+ FALLS/FALL W/INJURY/YR: ICD-10-PCS | Mod: CPTII,S$GLB,, | Performed by: INTERNAL MEDICINE

## 2019-09-25 PROCEDURE — 3288F PR FALLS RISK ASSESSMENT DOCUMENTED: ICD-10-PCS | Mod: CPTII,S$GLB,, | Performed by: INTERNAL MEDICINE

## 2019-09-25 RX ORDER — ALBUTEROL SULFATE 90 UG/1
AEROSOL, METERED RESPIRATORY (INHALATION)
Qty: 1 INHALER | Refills: 11 | Status: SHIPPED | OUTPATIENT
Start: 2019-09-25 | End: 2021-09-27 | Stop reason: SDUPTHER

## 2019-09-25 RX ORDER — MONTELUKAST SODIUM 10 MG/1
10 TABLET ORAL NIGHTLY
Qty: 30 TABLET | Refills: 11 | Status: SHIPPED | OUTPATIENT
Start: 2019-09-25 | End: 2021-09-27 | Stop reason: SDUPTHER

## 2019-09-25 RX ORDER — FLUTICASONE FUROATE AND VILANTEROL 200; 25 UG/1; UG/1
1 POWDER RESPIRATORY (INHALATION) DAILY
Qty: 1 EACH | Refills: 11 | Status: SHIPPED | OUTPATIENT
Start: 2019-09-25 | End: 2020-10-12 | Stop reason: SDUPTHER

## 2019-09-25 NOTE — PATIENT INSTRUCTIONS
singulair might be enough - controls sinuses and asthma,  Breathing test normal range.  Would continue singulair into future.    breo might not be needed?  Would use til April next yr- if no cough/short breath consider skipping.    Follow here as needed - refils with Dr Killian but call if any problems?

## 2019-09-25 NOTE — PROGRESS NOTES
"9/25/2019    Jeanette Evans  New Patient Consult    Chief Complaint   Patient presents with    Follow-up       HPI:   Sept 25, 2019-  No symptoms, sinuses good and no resp, uses breo and singulair , no rescue, pft nl range      April 23, 2019-some english speaking from [place], with daughter, never smoker, functions with no limits, has macular degeneration.  Pt was .  Has cough and wheezes and sob is minimal.  Seasonal variations with worse in fall and cold weather.  Sees Dr Killian - no steroid shots, pt's daughter had asthma. Pt had no problems til adult life, worsening last 5 yrs.  Pt forgets to use symbicort, uses prn resp rx mainly.  Prednisone use maybe 3times doc visits last yr.  No er visits nor admits.    Pt chr sinus drip and headaches.  Clear mucous.  Smell is good.not clearly cause lung problems?  Vague intermittent right upper abd or lower chest wall pain off and on  Yrs.    Patient Instructions   Chronic sinus- use Singulair daily and use flonase 1-2 daily if drainage  Persistent asthma - severe-- breathing will worsen with asthma and improve.  Should be normal between.  Common for chronic disease to be under appreciated.   Preventive- breo once daily and singulair daily   Rescue - use albuterol inhaler 1-3 as needed or nebulizer up to every 4 hours as needed   Action plan- if albuterol not working nor lasting, or having bad symptoms - take prednisone daily for 3 days - may repeat if not controlled.       - may use azithromycin if infection suspected - a pill daily for 3 days will last 10days  Best to treat asthma cough with asthma therapy.  Check chest xray with right side chest/upper abdominal pain ongoing last wk to years?  Check lung capacity on good day.  Seasonal use medications may be reasonable  The chief compliant  problem is new to me",   PFSH:  Past Medical History:   Diagnosis Date    Diabetes mellitus type II     GERD (gastroesophageal reflux disease)     " "Hypertension          Past Surgical History:   Procedure Laterality Date    CATARACT EXTRACTION      CHOLECYSTECTOMY       Social History     Tobacco Use    Smoking status: Never Smoker   Substance Use Topics    Alcohol use: No    Drug use: Not on file     Family History   Problem Relation Age of Onset    Cancer Mother     Heart disease Father      Review of patient's allergies indicates:   Allergen Reactions    Pcn [penicillins] Anaphylaxis     Other reaction(s): Anaphylaxis       Performance Status:The patient's activity level is no limits with regular activity.      Review of Systems:  a review of eleven systems covering constitutional, Eye, HEENT, Psych, Respiratory, Cardiac, GI, , Musculoskeletal, Endocrine, Dermatologic was negative except for pertinent findings as listed ABOVE and below:   pertinent positive as above, rest is good      Exam:Comprehensive exam done. BP (!) 171/74   Pulse 68   Ht 4' 10" (1.473 m)   Wt 71.6 kg (157 lb 11.8 oz)   LMP  (LMP Unknown)   SpO2 100%   BMI 32.97 kg/m²   Exam included Vitals as listed, and patient's appearance and affect and alertness and mood, oral exam for yeast and hygiene and pharynx lesions and Mallapatti (M) score, neck with inspection for jvd and masses and thyroid abnormalities and lymph nodes (supraclavicular and infraclavicular nodes and axillary also examined and noted if abn), chest exam included symmetry and effort and fremitus and percussion and auscultation, cardiac exam included rhythm and gallops and murmur and rubs and jvd and edema, abdominal exam for mass and hepatosplenomegaly and tenderness and hernias and bowel sounds, Musculoskeletal exam with muscle tone and posture and mobility/gait and  strength, and skin for rashes and cyanosis and pallor and turgor, extremity for clubbing.  Findings were normal except for pertinent findings listed below:   M, chest is symmetric, no distress, normal percussion, normal fremitus and good " normal breath sounds      Radiographs (ct chest and cxr) reviewed: view by direct vision  cxr 4/8 and 6/17 viewed left high dipahragm    Labs reviewed           PFT will be done and results to be reviewed       Plan:  Clinical impression is apparently straight forward and impression with management as below.     Jeanette was seen today for follow-up.    Diagnoses and all orders for this visit:    Severe persistent asthma without complication  -     montelukast (SINGULAIR) 10 mg tablet; Take 1 tablet (10 mg total) by mouth every evening.  -     fluticasone furoate-vilanterol (BREO ELLIPTA) 200-25 mcg/dose DsDv diskus inhaler; Inhale 1 puff into the lungs once daily. Controller  -     albuterol (PROVENTIL/VENTOLIN HFA) 90 mcg/actuation inhaler; 2 puffs every 4 hours as needed for cough, wheeze, or shortness of breath    Chronic sinus complaints  -     montelukast (SINGULAIR) 10 mg tablet; Take 1 tablet (10 mg total) by mouth every evening.        Follow up if symptoms worsen or fail to improve.    Discussed with patient above for education the following:      Patient Instructions   singulair might be enough - controls sinuses and asthma,  Breathing test normal range.  Would continue singulair into future.    breo might not be needed?  Would use til April next yr- if no cough/short breath consider skipping.    Follow here as needed - refils with Dr Killian but call if any problems?

## 2020-02-26 ENCOUNTER — OFFICE VISIT (OUTPATIENT)
Dept: PULMONOLOGY | Facility: CLINIC | Age: 85
End: 2020-02-26
Payer: MEDICARE

## 2020-02-26 ENCOUNTER — TELEPHONE (OUTPATIENT)
Dept: PULMONOLOGY | Facility: CLINIC | Age: 85
End: 2020-02-26

## 2020-02-26 VITALS
SYSTOLIC BLOOD PRESSURE: 171 MMHG | WEIGHT: 161.5 LBS | OXYGEN SATURATION: 97 % | DIASTOLIC BLOOD PRESSURE: 76 MMHG | HEART RATE: 71 BPM | BODY MASS INDEX: 33.75 KG/M2

## 2020-02-26 DIAGNOSIS — J45.51 SEVERE PERSISTENT ASTHMA WITH ACUTE EXACERBATION: Primary | ICD-10-CM

## 2020-02-26 PROBLEM — J44.1 COPD EXACERBATION: Status: RESOLVED | Noted: 2017-04-06 | Resolved: 2020-02-26

## 2020-02-26 PROCEDURE — 99999 PR PBB SHADOW E&M-EST. PATIENT-LVL IV: ICD-10-PCS | Mod: PBBFAC,,, | Performed by: INTERNAL MEDICINE

## 2020-02-26 PROCEDURE — 1101F PR PT FALLS ASSESS DOC 0-1 FALLS W/OUT INJ PAST YR: ICD-10-PCS | Mod: CPTII,S$GLB,, | Performed by: INTERNAL MEDICINE

## 2020-02-26 PROCEDURE — 99215 OFFICE O/P EST HI 40 MIN: CPT | Mod: S$GLB,,, | Performed by: INTERNAL MEDICINE

## 2020-02-26 PROCEDURE — 99999 PR PBB SHADOW E&M-EST. PATIENT-LVL IV: CPT | Mod: PBBFAC,,, | Performed by: INTERNAL MEDICINE

## 2020-02-26 PROCEDURE — 99215 PR OFFICE/OUTPT VISIT, EST, LEVL V, 40-54 MIN: ICD-10-PCS | Mod: S$GLB,,, | Performed by: INTERNAL MEDICINE

## 2020-02-26 PROCEDURE — 1159F MED LIST DOCD IN RCRD: CPT | Mod: S$GLB,,, | Performed by: INTERNAL MEDICINE

## 2020-02-26 PROCEDURE — 1159F PR MEDICATION LIST DOCUMENTED IN MEDICAL RECORD: ICD-10-PCS | Mod: S$GLB,,, | Performed by: INTERNAL MEDICINE

## 2020-02-26 PROCEDURE — 1125F PR PAIN SEVERITY QUANTIFIED, PAIN PRESENT: ICD-10-PCS | Mod: S$GLB,,, | Performed by: INTERNAL MEDICINE

## 2020-02-26 PROCEDURE — 1125F AMNT PAIN NOTED PAIN PRSNT: CPT | Mod: S$GLB,,, | Performed by: INTERNAL MEDICINE

## 2020-02-26 PROCEDURE — 1101F PT FALLS ASSESS-DOCD LE1/YR: CPT | Mod: CPTII,S$GLB,, | Performed by: INTERNAL MEDICINE

## 2020-02-26 RX ORDER — BENZONATATE 200 MG/1
200 CAPSULE ORAL 3 TIMES DAILY PRN
Qty: 30 CAPSULE | Refills: 1 | Status: SHIPPED | OUTPATIENT
Start: 2020-02-26 | End: 2020-03-07

## 2020-02-26 RX ORDER — ALBUTEROL SULFATE 0.83 MG/ML
2.5 SOLUTION RESPIRATORY (INHALATION) EVERY 6 HOURS PRN
Qty: 120 EACH | Refills: 11 | Status: SHIPPED | OUTPATIENT
Start: 2020-02-26 | End: 2021-02-25

## 2020-02-26 NOTE — PROGRESS NOTES
"2/26/2020    Jeanette Connie Evans  New Patient Consult    Chief Complaint   Patient presents with    Cough     for 8-10 days,productive, yellow        HPI:   02/26/2020- Hx given by daughter. coughing x 10 days, initially w/ green & brown phlegm.  Cough worse at night. A/w chest tightness and shortness of breath but now better.  Had contact w/ sick relative.  Cough is getting better now- less phlegm now dry cough. Taking "emergency kit" which is azithromycin, prednisone and albuterol inhaler. Reports nebulizer machine is broken. She has had flu shot and pneumonia shot (4 yrs ago). Last year did well w/o exacerbation, this is the first one in a long time.   Inhalers: breo (daily), albuterol (only when sick)    Sept 25, 2019-  singulair might be enough - controls sinuses and asthma,  Breathing test normal range.  Would continue singulair into future.  breo might not be needed?  Would use til April next yr- if no cough/short breath consider skipping.  Follow here as needed - refils with Dr Killian but call if any problems?  No symptoms, sinuses good and no resp, uses breo and singulair , no rescue, pft nl range      April 23, 2019-some english speaking from [place], with daughter, never smoker, functions with no limits, has macular degeneration.  Pt was .  Has cough and wheezes and sob is minimal.  Seasonal variations with worse in fall and cold weather.  Sees Dr Killian - no steroid shots, pt's daughter had asthma. Pt had no problems til adult life, worsening last 5 yrs.  Pt forgets to use symbicort, uses prn resp rx mainly.  Prednisone use maybe 3times doc visits last yr.  No er visits nor admits.    Pt chr sinus drip and headaches.  Clear mucous.  Smell is good.not clearly cause lung problems?  Vague intermittent right upper abd or lower chest wall pain off and on  Yrs.    Patient Instructions   Chronic sinus- use Singulair daily and use flonase 1-2 daily if drainage  Persistent asthma - " "severe-- breathing will worsen with asthma and improve.  Should be normal between.  Common for chronic disease to be under appreciated.   Preventive- breo once daily and singulair daily   Rescue - use albuterol inhaler 1-3 as needed or nebulizer up to every 4 hours as needed   Action plan- if albuterol not working nor lasting, or having bad symptoms - take prednisone daily for 3 days - may repeat if not controlled.       - may use azithromycin if infection suspected - a pill daily for 3 days will last 10days  Best to treat asthma cough with asthma therapy.  Check chest xray with right side chest/upper abdominal pain ongoing last wk to years?  Check lung capacity on good day.  Seasonal use medications may be reasonable  The chief compliant  problem is new to me",   PFSH:  Past Medical History:   Diagnosis Date    Diabetes mellitus type II     GERD (gastroesophageal reflux disease)     Hypertension          Past Surgical History:   Procedure Laterality Date    CATARACT EXTRACTION      CHOLECYSTECTOMY       Social History     Tobacco Use    Smoking status: Never Smoker   Substance Use Topics    Alcohol use: No    Drug use: Not on file     Family History   Problem Relation Age of Onset    Cancer Mother     Heart disease Father      Review of patient's allergies indicates:   Allergen Reactions    Pcn [penicillins] Anaphylaxis     Other reaction(s): Anaphylaxis       Performance Status:The patient's activity level is no limits with regular activity.      Review of Systems:  a review of eleven systems covering constitutional, Eye, HEENT, Psych, Respiratory, Cardiac, GI, , Musculoskeletal, Endocrine, Dermatologic was negative except for pertinent findings as listed ABOVE and below:    All negative with pertinent positives as above       Exam:Comprehensive exam done. BP (!) 171/76 (BP Location: Right arm, Patient Position: Sitting)   Pulse 71   Wt 73.2 kg (161 lb 7.8 oz)   LMP  (LMP Unknown)   SpO2 97% " Comment: on room air at rest  BMI 33.75 kg/m²   Exam included Vitals as listed, and patient's appearance and affect and alertness and mood, oral exam for yeast and hygiene and pharynx lesions and Mallapatti (M) score, neck with inspection for jvd and masses and thyroid abnormalities and lymph nodes (supraclavicular and infraclavicular nodes and axillary also examined and noted if abn), chest exam included symmetry and effort and fremitus and percussion and auscultation, cardiac exam included rhythm and gallops and murmur and rubs and jvd and edema, abdominal exam for mass and hepatosplenomegaly and tenderness and hernias and bowel sounds, Musculoskeletal exam with muscle tone and posture and mobility/gait and  strength, and skin for rashes and cyanosis and pallor and turgor, extremity for clubbing.  Findings were normal except for pertinent findings listed below:  M3  Lungs clear  1+ pitting edema L>R lower ext      Radiographs (ct chest and cxr) reviewed: view by direct vision    cxr 4/2019 elevated left high dipahragm    Labs none      PFT results reviewed- with no obstruction.  Restriction on lung volume testing      Plan:  Clinical impression is apparently straight forward and impression with management as below.     Jeanette was seen today for cough.    Diagnoses and all orders for this visit:    Severe persistent asthma with acute exacerbation  -     NEBULIZER FOR HOME USE  -     albuterol (PROVENTIL) 2.5 mg /3 mL (0.083 %) nebulizer solution; Take 3 mLs (2.5 mg total) by nebulization every 6 (six) hours as needed for Wheezing.  -     benzonatate (TESSALON) 200 MG capsule; Take 1 capsule (200 mg total) by mouth 3 (three) times daily as needed for Cough.        Follow up in about 3 months (around 5/26/2020).    Discussed with patient above for education the following:      Patient Instructions   Continue prednisone 1/2 tab a day for 3 more days then stop  Tessalon ordered for cough  Can use Mucinex for  congestion as needed  Continue breo inhaler  New nebulizer ordered- w/ albuterol- use as needed  No more antibiotics needed- probably viral infection, getting better  Cough can last 6-8 weeks after a cold, continue symptomatic treatments and should get better with time

## 2020-02-26 NOTE — PATIENT INSTRUCTIONS
Continue prednisone 1/2 tab a day for 3 more days then stop  Tessalon ordered for cough  Can use Mucinex for congestion as needed  Continue breo inhaler  New nebulizer ordered- w/ albuterol- use as needed  No more antibiotics needed- probably viral infection, getting better  Cough can last 6-8 weeks after a cold, continue symptomatic treatments and should get better with time

## 2020-03-20 ENCOUNTER — TELEPHONE (OUTPATIENT)
Dept: PULMONOLOGY | Facility: CLINIC | Age: 85
End: 2020-03-20

## 2020-07-14 ENCOUNTER — OFFICE VISIT (OUTPATIENT)
Dept: PODIATRY | Facility: CLINIC | Age: 85
End: 2020-07-14
Payer: MEDICARE

## 2020-07-14 VITALS — HEIGHT: 58 IN | BODY MASS INDEX: 33.8 KG/M2 | TEMPERATURE: 99 F | WEIGHT: 161 LBS

## 2020-07-14 DIAGNOSIS — R20.2 PARESTHESIA OF FOOT, BILATERAL: ICD-10-CM

## 2020-07-14 DIAGNOSIS — L84 CORN OR CALLUS: ICD-10-CM

## 2020-07-14 DIAGNOSIS — M20.41 HAMMER TOES OF BOTH FEET: ICD-10-CM

## 2020-07-14 DIAGNOSIS — E11.42 DIABETIC POLYNEUROPATHY ASSOCIATED WITH TYPE 2 DIABETES MELLITUS: Primary | ICD-10-CM

## 2020-07-14 DIAGNOSIS — M20.42 HAMMER TOES OF BOTH FEET: ICD-10-CM

## 2020-07-14 PROCEDURE — 1159F MED LIST DOCD IN RCRD: CPT | Mod: S$GLB,,, | Performed by: PODIATRIST

## 2020-07-14 PROCEDURE — 99999 PR PBB SHADOW E&M-EST. PATIENT-LVL III: ICD-10-PCS | Mod: PBBFAC,,, | Performed by: PODIATRIST

## 2020-07-14 PROCEDURE — 1126F AMNT PAIN NOTED NONE PRSNT: CPT | Mod: S$GLB,,, | Performed by: PODIATRIST

## 2020-07-14 PROCEDURE — 1101F PR PT FALLS ASSESS DOC 0-1 FALLS W/OUT INJ PAST YR: ICD-10-PCS | Mod: CPTII,S$GLB,, | Performed by: PODIATRIST

## 2020-07-14 PROCEDURE — 99214 PR OFFICE/OUTPT VISIT, EST, LEVL IV, 30-39 MIN: ICD-10-PCS | Mod: S$GLB,,, | Performed by: PODIATRIST

## 2020-07-14 PROCEDURE — 1101F PT FALLS ASSESS-DOCD LE1/YR: CPT | Mod: CPTII,S$GLB,, | Performed by: PODIATRIST

## 2020-07-14 PROCEDURE — 99214 OFFICE O/P EST MOD 30 MIN: CPT | Mod: S$GLB,,, | Performed by: PODIATRIST

## 2020-07-14 PROCEDURE — 99999 PR PBB SHADOW E&M-EST. PATIENT-LVL III: CPT | Mod: PBBFAC,,, | Performed by: PODIATRIST

## 2020-07-14 PROCEDURE — 1126F PR PAIN SEVERITY QUANTIFIED, NO PAIN PRESENT: ICD-10-PCS | Mod: S$GLB,,, | Performed by: PODIATRIST

## 2020-07-14 PROCEDURE — 1159F PR MEDICATION LIST DOCUMENTED IN MEDICAL RECORD: ICD-10-PCS | Mod: S$GLB,,, | Performed by: PODIATRIST

## 2020-07-15 NOTE — PROGRESS NOTES
Subjective:      Patient ID: Jeanette Evans is a 85 y.o. female.    Chief Complaint: Diabetes Mellitus (Mervin Christianson 5/30/20  A1C on 4/6/17) and Diabetic Foot Exam (nail care )    Jeanette is a 85 y.o. female who presents to the clinic for evaluation and treatment of diabetic feet. Jeanette has a past medical history of Diabetes mellitus type II, GERD (gastroesophageal reflux disease), and Hypertension. Patient does not speak English and relies on her daughter to serve as as .  Notes that her mother has thickened toenails that are difficult to trim.  Has attempted to trim on her own with difficulty.  Inquires as to how nail appearance can be improved.  Also, relates her mother has expressed symptoms of neuropathy.  Notes symptoms occur sporadically and are described as nocturnal burning.  She has not attempted to self treat.  Denies overt pain from the nails.      PCP: Mervin Killian MD    Date Last Seen by PCP: 5/20    Hemoglobin A1C   Date Value Ref Range Status   04/06/2017 5.9 4.5 - 6.2 % Final     Comment:     According to ADA guidelines, hemoglobin A1C <7.0% represents  optimal control in non-pregnant diabetic patients.  Different  metrics may apply to specific populations.   Standards of Medical Care in Diabetes - 2016.  For the purpose of screening for the presence of diabetes:  <5.7%     Consistent with the absence of diabetes  5.7-6.4%  Consistent with increasing risk for diabetes   (prediabetes)  >or=6.5%  Consistent with diabetes  Currently no consensus exists for use of hemoglobin A1C  for diagnosis of diabetes for children.             Past Medical History:   Diagnosis Date    Diabetes mellitus type II     GERD (gastroesophageal reflux disease)     Hypertension        Past Surgical History:   Procedure Laterality Date    CATARACT EXTRACTION      CHOLECYSTECTOMY         Family History   Problem Relation Age of Onset    Cancer Mother     Heart disease Father        Social History      Socioeconomic History    Marital status:      Spouse name: Not on file    Number of children: Not on file    Years of education: Not on file    Highest education level: Not on file   Occupational History    Not on file   Social Needs    Financial resource strain: Not on file    Food insecurity     Worry: Not on file     Inability: Not on file    Transportation needs     Medical: Not on file     Non-medical: Not on file   Tobacco Use    Smoking status: Never Smoker   Substance and Sexual Activity    Alcohol use: No    Drug use: Not on file    Sexual activity: Not on file   Lifestyle    Physical activity     Days per week: Not on file     Minutes per session: Not on file    Stress: Not on file   Relationships    Social connections     Talks on phone: Not on file     Gets together: Not on file     Attends Sikhism service: Not on file     Active member of club or organization: Not on file     Attends meetings of clubs or organizations: Not on file     Relationship status: Not on file   Other Topics Concern    Not on file   Social History Narrative    Not on file       Current Outpatient Medications   Medication Sig Dispense Refill    albuterol (PROVENTIL) 2.5 mg /3 mL (0.083 %) nebulizer solution Take 3 mLs (2.5 mg total) by nebulization every 6 (six) hours as needed for Wheezing. 120 each 11    albuterol (PROVENTIL/VENTOLIN HFA) 90 mcg/actuation inhaler 2 puffs every 4 hours as needed for cough, wheeze, or shortness of breath 1 Inhaler 11    albuterol-ipratropium 2.5mg-0.5mg/3mL (DUO-NEB) 0.5 mg-3 mg(2.5 mg base)/3 mL nebulizer solution       alendronate (FOSAMAX) 70 MG tablet TAKE 1 TABLET BY MOUTH EVERY WEEK      atorvastatin (LIPITOR) 10 MG tablet Take 10 mg by mouth.      ciclopirox (LOPROX) 0.77 % Crea Apply topically 2 (two) times daily. 90 g 2    cloNIDine (CATAPRES) 0.1 MG tablet TK 1 T PO QD  PRF HIGH BP      esomeprazole (NEXIUM) 40 MG capsule TK 1 C PO QD       fluticasone (FLONASE) 50 mcg/actuation nasal spray 1 spray (50 mcg total) by Each Nare route once daily. 1 Bottle 11    fluticasone furoate-vilanterol (BREO ELLIPTA) 200-25 mcg/dose DsDv diskus inhaler Inhale 1 puff into the lungs once daily. Controller 1 each 11    gabapentin (NEURONTIN) 300 MG capsule TAKE ONE CAPSULE BY MOUTH EVERY NIGHT AT BEDTIME FOR 10 DAYS, THEN TWICE DAILY      glimepiride (AMARYL) 2 MG tablet TAKE 1/2 TABLET BY MOUTH DAILY WITH FIRST MAIN MEAL OF THE DAY      latanoprost 0.005 % ophthalmic solution Place 1 drop into both eyes every evening.       losartan (COZAAR) 50 MG tablet TK 1 T PO BID      magnesium oxide (MAG-OX) 400 mg (241.3 mg magnesium) tablet 1 po q hs      metoprolol tartrate (LOPRESSOR) 25 MG tablet TK 1 T PO BID      montelukast (SINGULAIR) 10 mg tablet Take 1 tablet (10 mg total) by mouth every evening. 30 tablet 11    NEXIUM 40 mg capsule       salicylic acid (SALACYN) 6 % Crea Apply 1 application topically 2 (two) times daily. 454 g 11    VOLTAREN 1 % Gel   0    cetirizine (ZYRTEC) 10 MG tablet Take 1 tablet (10 mg total) by mouth once daily. 30 tablet 2     No current facility-administered medications for this visit.        Review of patient's allergies indicates:   Allergen Reactions    Pcn [penicillins] Anaphylaxis     Other reaction(s): Anaphylaxis         Review of Systems   Constitution: Negative for chills and fever.   Cardiovascular: Negative for claudication and leg swelling.   Skin: Positive for color change, dry skin and nail changes.   Musculoskeletal: Positive for muscle cramps. Negative for joint swelling, muscle weakness and myalgias.   Gastrointestinal: Negative for nausea and vomiting.   Neurological: Positive for paresthesias. Negative for numbness.   Psychiatric/Behavioral: Negative for altered mental status.           Objective:      Physical Exam  Constitutional:       Appearance: Normal appearance. She is not ill-appearing.    Cardiovascular:      Pulses:           Dorsalis pedis pulses are 2+ on the right side and 2+ on the left side.        Posterior tibial pulses are 2+ on the right side and 2+ on the left side.      Comments: CFT is < 3 seconds bilateral.  Pedal hair growth is decreased bilateral.  Varicosities noted bilateral.  No lower extremity edema noted bilateral.  Toes are warm to touch bilateral.    Musculoskeletal:         General: No tenderness or signs of injury.      Right lower leg: No edema.      Left lower leg: No edema.      Comments: Muscle strength 5/5 in all muscle groups bilateral.  No tenderness nor crepitation with ROM of foot/ankle joints bilateral.  No tenderness with palpation of bilateral foot and ankle.  Bilateral semi-reducible contracture of toes 2-5.     Skin:     General: Skin is warm and dry.      Capillary Refill: Capillary refill takes 2 to 3 seconds.      Findings: Lesion present. No bruising, ecchymosis, erythema, petechiae, rash or wound.      Comments: Pedal skin has normal turgor, temperature, and texture bilateral.  Toenails x 10 appear thickened by 2 mm, elongated by 4 mm, and discolored with subungual debris.  Hyperkeratotic lesions noted to the tips of toes 2-5 bilateral.   Examination of the skin reveals no evidence of significant maceration, rashes, open lesions, suspicious appearing nevi or other concerning lesions.    Neurological:      General: No focal deficit present.      Mental Status: She is alert.      Sensory: No sensory deficit.      Motor: Motor function is intact.      Coordination: Coordination is intact.      Comments: Protective sensation per [place] monofilament is intact bilateral. Light touch is intact bilateral.               Assessment:       Encounter Diagnoses   Name Primary?    Corn or callus     Hammer toes of both feet     Diabetic polyneuropathy associated with type 2 diabetes mellitus Yes    Paresthesia of foot, bilateral          Plan:       Jeanette  was seen today for diabetes mellitus and diabetic foot exam.    Diagnoses and all orders for this visit:    Diabetic polyneuropathy associated with type 2 diabetes mellitus    Corn or callus    Hammer toes of both feet    Paresthesia of foot, bilateral      I counseled the patient on her conditions, their implications and medical management.    Advised to apply amlactin to the tips of the toes.    Fitted and dispensed crest pads to offload bilateral hammertoes.    Discussed applying vicks vaporub to all toenails to begin treating onychomycosis.  To apply QD x 6 months.     Given both verbal and written information regarding alpha lipoic acid and B-complex vitamins.  Take as instructed in clinic.    Shoe inspection. Diabetic Foot Education. Patient reminded of the importance of good nutrition and blood sugar control to help prevent podiatric complications of diabetes. Patient instructed on proper foot hygeine. We discussed wearing proper shoe gear, daily foot inspections, never walking without protective shoe gear, never putting sharp instruments to feet    With patient's permission, nails were aggressively reduced and debrided x 10 to their soft tissue attachment mechanically and with electric , removing all offending nail and debris. Patient relates relief following the procedure.  This was performed as a courtesy with today's exam.  She will continue to monitor the areas daily, inspect her feet, wear protective shoe gear when ambulatory, moisturizer to maintain skin integrity and follow in this office in approximately 6 months, sooner p.r.n.    Follow up in about 6 months (around 1/14/2021).    Chirag Le DPM

## 2020-08-03 ENCOUNTER — OFFICE VISIT (OUTPATIENT)
Dept: OPHTHALMOLOGY | Facility: CLINIC | Age: 85
End: 2020-08-03
Payer: MEDICARE

## 2020-08-03 DIAGNOSIS — H35.3212 EXUDATIVE AGE-RELATED MACULAR DEGENERATION OF RIGHT EYE WITH INACTIVE CHOROIDAL NEOVASCULARIZATION: ICD-10-CM

## 2020-08-03 DIAGNOSIS — H35.3221 EXUDATIVE AGE-RELATED MACULAR DEGENERATION OF LEFT EYE WITH ACTIVE CHOROIDAL NEOVASCULARIZATION: Primary | ICD-10-CM

## 2020-08-03 DIAGNOSIS — H43.823 VITREOMACULAR ADHESION, BILATERAL: ICD-10-CM

## 2020-08-03 PROCEDURE — 92134 CPTRZ OPH DX IMG PST SGM RTA: CPT | Mod: S$GLB,,, | Performed by: OPHTHALMOLOGY

## 2020-08-03 PROCEDURE — 92202 PR OPHTHALMOSCOPY, EXT, W/DRAW OPTIC NERVE/MACULA, I&R, UNI/BI: ICD-10-PCS | Mod: S$GLB,,, | Performed by: OPHTHALMOLOGY

## 2020-08-03 PROCEDURE — 92134 POSTERIOR SEGMENT OCT RETINA (OCULAR COHERENCE TOMOGRAPHY)-BOTH EYES: ICD-10-PCS | Mod: S$GLB,,, | Performed by: OPHTHALMOLOGY

## 2020-08-03 PROCEDURE — 99999 PR PBB SHADOW E&M-EST. PATIENT-LVL IV: ICD-10-PCS | Mod: PBBFAC,,, | Performed by: OPHTHALMOLOGY

## 2020-08-03 PROCEDURE — 99999 PR PBB SHADOW E&M-EST. PATIENT-LVL IV: CPT | Mod: PBBFAC,,, | Performed by: OPHTHALMOLOGY

## 2020-08-03 PROCEDURE — 92014 COMPRE OPH EXAM EST PT 1/>: CPT | Mod: S$GLB,,, | Performed by: OPHTHALMOLOGY

## 2020-08-03 PROCEDURE — 92202 OPSCPY EXTND ON/MAC DRAW: CPT | Mod: S$GLB,,, | Performed by: OPHTHALMOLOGY

## 2020-08-03 PROCEDURE — 92014 PR EYE EXAM, EST PATIENT,COMPREHESV: ICD-10-PCS | Mod: S$GLB,,, | Performed by: OPHTHALMOLOGY

## 2020-08-03 NOTE — PROGRESS NOTES
HPI     DLS: 8/15/19    Patient states her vision is ok. OS fells like sand. 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 HTN REt OU    3. NS OD - no Sx because of #1  PCIOL OS        12 months OCT

## 2020-08-13 ENCOUNTER — HOSPITAL ENCOUNTER (OUTPATIENT)
Dept: RADIOLOGY | Facility: HOSPITAL | Age: 85
Discharge: HOME | End: 2020-08-13
Attending: OTOLARYNGOLOGY
Payer: MEDICARE

## 2020-08-13 DIAGNOSIS — J32.4 CHRONIC PANSINUSITIS: Primary | ICD-10-CM

## 2020-08-13 DIAGNOSIS — J32.4 CHRONIC PANSINUSITIS: ICD-10-CM

## 2020-08-13 PROCEDURE — 70220 X-RAY EXAM OF SINUSES: CPT | Mod: TC,PO

## 2020-08-27 DIAGNOSIS — H91.22 SUDDEN IDIOPATHIC HEARING LOSS OF LEFT EAR: Primary | ICD-10-CM

## 2020-08-27 DIAGNOSIS — H90.3 SENSORY HEARING LOSS, BILATERAL: ICD-10-CM

## 2020-09-01 ENCOUNTER — HOSPITAL ENCOUNTER (OUTPATIENT)
Dept: RADIOLOGY | Facility: HOSPITAL | Age: 85
Discharge: HOME | End: 2020-09-01
Attending: OTOLARYNGOLOGY
Payer: MEDICARE

## 2020-09-01 DIAGNOSIS — H91.22 SUDDEN IDIOPATHIC HEARING LOSS OF LEFT EAR: ICD-10-CM

## 2020-09-01 DIAGNOSIS — H90.3 SENSORY HEARING LOSS, BILATERAL: ICD-10-CM

## 2020-09-01 LAB
CREAT SERPL-MCNC: 1.2 MG/DL (ref 0.5–1.4)
SAMPLE: NORMAL

## 2020-09-09 ENCOUNTER — HOSPITAL ENCOUNTER (OUTPATIENT)
Dept: RADIOLOGY | Facility: HOSPITAL | Age: 85
Discharge: HOME | End: 2020-09-09
Attending: OTOLARYNGOLOGY
Payer: MEDICARE

## 2020-09-09 PROCEDURE — 70553 MRI BRAIN STEM W/O & W/DYE: CPT | Mod: TC,PO

## 2020-09-09 PROCEDURE — 25500020 PHARM REV CODE 255: Mod: PO | Performed by: OTOLARYNGOLOGY

## 2020-09-09 PROCEDURE — A9585 GADOBUTROL INJECTION: HCPCS | Mod: PO | Performed by: OTOLARYNGOLOGY

## 2020-09-09 RX ORDER — GADOBUTROL 604.72 MG/ML
7.5 INJECTION INTRAVENOUS
Status: COMPLETED | OUTPATIENT
Start: 2020-09-09 | End: 2020-09-09

## 2020-09-09 RX ADMIN — GADOBUTROL 7.5 ML: 604.72 INJECTION INTRAVENOUS at 08:09

## 2020-10-12 DIAGNOSIS — J45.50 SEVERE PERSISTENT ASTHMA WITHOUT COMPLICATION: ICD-10-CM

## 2020-10-12 RX ORDER — FLUTICASONE FUROATE AND VILANTEROL TRIFENATATE 200; 25 UG/1; UG/1
1 POWDER RESPIRATORY (INHALATION) DAILY
Qty: 1 EACH | Refills: 11 | Status: SHIPPED | OUTPATIENT
Start: 2020-10-12 | End: 2021-09-27 | Stop reason: SDUPTHER

## 2020-12-31 DIAGNOSIS — R10.31 RIGHT LOWER QUADRANT ABDOMINAL PAIN: Primary | ICD-10-CM

## 2021-01-05 DIAGNOSIS — R10.31 ABDOMINAL PAIN, RIGHT LOWER QUADRANT: Primary | ICD-10-CM

## 2021-01-06 ENCOUNTER — HOSPITAL ENCOUNTER (OUTPATIENT)
Dept: RADIOLOGY | Facility: HOSPITAL | Age: 86
Discharge: HOME | End: 2021-01-06
Attending: NURSE PRACTITIONER
Payer: MEDICARE

## 2021-01-06 DIAGNOSIS — R10.31 RIGHT LOWER QUADRANT ABDOMINAL PAIN: ICD-10-CM

## 2021-01-06 DIAGNOSIS — R10.31 ABDOMINAL PAIN, RIGHT LOWER QUADRANT: ICD-10-CM

## 2021-01-06 PROCEDURE — 73502 X-RAY EXAM HIP UNI 2-3 VIEWS: CPT | Mod: TC,PO,RT

## 2021-01-06 PROCEDURE — 74176 CT ABD & PELVIS W/O CONTRAST: CPT | Mod: TC,PO

## 2021-01-07 ENCOUNTER — IMMUNIZATION (OUTPATIENT)
Dept: FAMILY MEDICINE | Facility: CLINIC | Age: 86
End: 2021-01-07
Payer: MEDICARE

## 2021-01-07 DIAGNOSIS — Z23 NEED FOR VACCINATION: ICD-10-CM

## 2021-01-07 PROCEDURE — 91300 COVID-19, MRNA, LNP-S, PF, 30 MCG/0.3 ML DOSE VACCINE: CPT | Mod: PBBFAC | Performed by: INTERNAL MEDICINE

## 2021-01-28 ENCOUNTER — IMMUNIZATION (OUTPATIENT)
Dept: FAMILY MEDICINE | Facility: CLINIC | Age: 86
End: 2021-01-28
Payer: MEDICARE

## 2021-01-28 DIAGNOSIS — Z23 NEED FOR VACCINATION: Primary | ICD-10-CM

## 2021-01-28 PROCEDURE — 0002A COVID-19, MRNA, LNP-S, PF, 30 MCG/0.3 ML DOSE VACCINE: CPT | Mod: PBBFAC | Performed by: FAMILY MEDICINE

## 2021-01-28 PROCEDURE — 91300 COVID-19, MRNA, LNP-S, PF, 30 MCG/0.3 ML DOSE VACCINE: CPT | Mod: PBBFAC | Performed by: FAMILY MEDICINE

## 2021-02-23 ENCOUNTER — OFFICE VISIT (OUTPATIENT)
Dept: OPHTHALMOLOGY | Facility: CLINIC | Age: 86
End: 2021-02-23
Payer: MEDICARE

## 2021-02-23 DIAGNOSIS — H43.823 VITREOMACULAR ADHESION, BILATERAL: ICD-10-CM

## 2021-02-23 DIAGNOSIS — H35.3212 EXUDATIVE AGE-RELATED MACULAR DEGENERATION OF RIGHT EYE WITH INACTIVE CHOROIDAL NEOVASCULARIZATION: Primary | ICD-10-CM

## 2021-02-23 DIAGNOSIS — H35.033 HYPERTENSIVE RETINOPATHY, BILATERAL: ICD-10-CM

## 2021-02-23 DIAGNOSIS — H35.3221 EXUDATIVE AGE-RELATED MACULAR DEGENERATION OF LEFT EYE WITH ACTIVE CHOROIDAL NEOVASCULARIZATION: ICD-10-CM

## 2021-02-23 PROCEDURE — 99999 PR PBB SHADOW E&M-EST. PATIENT-LVL III: ICD-10-PCS | Mod: PBBFAC,,, | Performed by: OPHTHALMOLOGY

## 2021-02-23 PROCEDURE — 99999 PR PBB SHADOW E&M-EST. PATIENT-LVL III: CPT | Mod: PBBFAC,,, | Performed by: OPHTHALMOLOGY

## 2021-02-23 PROCEDURE — 92202 OPSCPY EXTND ON/MAC DRAW: CPT | Mod: S$GLB,,, | Performed by: OPHTHALMOLOGY

## 2021-02-23 PROCEDURE — 92014 COMPRE OPH EXAM EST PT 1/>: CPT | Mod: S$GLB,,, | Performed by: OPHTHALMOLOGY

## 2021-02-23 PROCEDURE — 1126F PR PAIN SEVERITY QUANTIFIED, NO PAIN PRESENT: ICD-10-PCS | Mod: S$GLB,,, | Performed by: OPHTHALMOLOGY

## 2021-02-23 PROCEDURE — 92202 PR OPHTHALMOSCOPY, EXT, W/DRAW OPTIC NERVE/MACULA, I&R, UNI/BI: ICD-10-PCS | Mod: S$GLB,,, | Performed by: OPHTHALMOLOGY

## 2021-02-23 PROCEDURE — 2023F DILAT RTA XM W/O RTNOPTHY: CPT | Mod: S$GLB,,, | Performed by: OPHTHALMOLOGY

## 2021-02-23 PROCEDURE — 92134 CPTRZ OPH DX IMG PST SGM RTA: CPT | Mod: S$GLB,,, | Performed by: OPHTHALMOLOGY

## 2021-02-23 PROCEDURE — 2023F PR DILATED RETINAL EXAM W/O EVID OF RETINOPATHY: ICD-10-PCS | Mod: S$GLB,,, | Performed by: OPHTHALMOLOGY

## 2021-02-23 PROCEDURE — 1101F PR PT FALLS ASSESS DOC 0-1 FALLS W/OUT INJ PAST YR: ICD-10-PCS | Mod: CPTII,S$GLB,, | Performed by: OPHTHALMOLOGY

## 2021-02-23 PROCEDURE — 92014 PR EYE EXAM, EST PATIENT,COMPREHESV: ICD-10-PCS | Mod: S$GLB,,, | Performed by: OPHTHALMOLOGY

## 2021-02-23 PROCEDURE — 1101F PT FALLS ASSESS-DOCD LE1/YR: CPT | Mod: CPTII,S$GLB,, | Performed by: OPHTHALMOLOGY

## 2021-02-23 PROCEDURE — 3288F FALL RISK ASSESSMENT DOCD: CPT | Mod: CPTII,S$GLB,, | Performed by: OPHTHALMOLOGY

## 2021-02-23 PROCEDURE — 1126F AMNT PAIN NOTED NONE PRSNT: CPT | Mod: S$GLB,,, | Performed by: OPHTHALMOLOGY

## 2021-02-23 PROCEDURE — 92134 POSTERIOR SEGMENT OCT RETINA (OCULAR COHERENCE TOMOGRAPHY)-BOTH EYES: ICD-10-PCS | Mod: S$GLB,,, | Performed by: OPHTHALMOLOGY

## 2021-02-23 PROCEDURE — 3288F PR FALLS RISK ASSESSMENT DOCUMENTED: ICD-10-PCS | Mod: CPTII,S$GLB,, | Performed by: OPHTHALMOLOGY

## 2021-03-16 DIAGNOSIS — M85.88 MASS OF HARD PALATE: ICD-10-CM

## 2021-03-16 DIAGNOSIS — J44.89 OBSTRUCTIVE CHRONIC BRONCHITIS WITHOUT EXACERBATION: Primary | ICD-10-CM

## 2021-03-16 DIAGNOSIS — M81.0 SENILE OSTEOPOROSIS: Primary | ICD-10-CM

## 2021-04-02 ENCOUNTER — HOSPITAL ENCOUNTER (OUTPATIENT)
Dept: RADIOLOGY | Facility: HOSPITAL | Age: 86
Discharge: HOME | End: 2021-04-02
Attending: NURSE PRACTITIONER
Payer: MEDICARE

## 2021-04-02 DIAGNOSIS — M81.0 SENILE OSTEOPOROSIS: ICD-10-CM

## 2021-04-02 DIAGNOSIS — M85.88 MASS OF HARD PALATE: ICD-10-CM

## 2021-04-02 DIAGNOSIS — J44.89 OBSTRUCTIVE CHRONIC BRONCHITIS WITHOUT EXACERBATION: ICD-10-CM

## 2021-04-02 PROCEDURE — 71046 X-RAY EXAM CHEST 2 VIEWS: CPT | Mod: TC,PO

## 2021-04-02 PROCEDURE — 77080 DXA BONE DENSITY AXIAL: CPT | Mod: TC,PO

## 2021-08-03 ENCOUNTER — TELEPHONE (OUTPATIENT)
Dept: PODIATRY | Facility: CLINIC | Age: 86
End: 2021-08-03

## 2021-08-10 ENCOUNTER — HOSPITAL ENCOUNTER (OUTPATIENT)
Dept: RADIOLOGY | Facility: HOSPITAL | Age: 86
Discharge: HOME | End: 2021-08-10
Attending: FAMILY MEDICINE
Payer: MEDICARE

## 2021-08-10 DIAGNOSIS — M79.672 LEFT FOOT PAIN: ICD-10-CM

## 2021-08-10 PROCEDURE — 73620 X-RAY EXAM OF FOOT: CPT | Mod: TC,PO,LT

## 2021-08-12 ENCOUNTER — OFFICE VISIT (OUTPATIENT)
Dept: PODIATRY | Facility: CLINIC | Age: 86
End: 2021-08-12
Payer: MEDICARE

## 2021-08-12 VITALS — HEIGHT: 58 IN | WEIGHT: 160.94 LBS | BODY MASS INDEX: 33.78 KG/M2

## 2021-08-12 DIAGNOSIS — E11.42 DIABETIC POLYNEUROPATHY ASSOCIATED WITH TYPE 2 DIABETES MELLITUS: ICD-10-CM

## 2021-08-12 DIAGNOSIS — M19.079 ARTHRITIS OF ANKLE JOINT: Primary | ICD-10-CM

## 2021-08-12 PROCEDURE — 1101F PR PT FALLS ASSESS DOC 0-1 FALLS W/OUT INJ PAST YR: ICD-10-PCS | Mod: CPTII,S$GLB,, | Performed by: PODIATRIST

## 2021-08-12 PROCEDURE — 99213 PR OFFICE/OUTPT VISIT, EST, LEVL III, 20-29 MIN: ICD-10-PCS | Mod: S$GLB,,, | Performed by: PODIATRIST

## 2021-08-12 PROCEDURE — 1159F MED LIST DOCD IN RCRD: CPT | Mod: CPTII,S$GLB,, | Performed by: PODIATRIST

## 2021-08-12 PROCEDURE — 1101F PT FALLS ASSESS-DOCD LE1/YR: CPT | Mod: CPTII,S$GLB,, | Performed by: PODIATRIST

## 2021-08-12 PROCEDURE — 99999 PR PBB SHADOW E&M-EST. PATIENT-LVL III: CPT | Mod: PBBFAC,,, | Performed by: PODIATRIST

## 2021-08-12 PROCEDURE — 3288F FALL RISK ASSESSMENT DOCD: CPT | Mod: CPTII,S$GLB,, | Performed by: PODIATRIST

## 2021-08-12 PROCEDURE — 99999 PR PBB SHADOW E&M-EST. PATIENT-LVL III: ICD-10-PCS | Mod: PBBFAC,,, | Performed by: PODIATRIST

## 2021-08-12 PROCEDURE — 99213 OFFICE O/P EST LOW 20 MIN: CPT | Mod: S$GLB,,, | Performed by: PODIATRIST

## 2021-08-12 PROCEDURE — 1159F PR MEDICATION LIST DOCUMENTED IN MEDICAL RECORD: ICD-10-PCS | Mod: CPTII,S$GLB,, | Performed by: PODIATRIST

## 2021-08-12 PROCEDURE — 1125F AMNT PAIN NOTED PAIN PRSNT: CPT | Mod: CPTII,S$GLB,, | Performed by: PODIATRIST

## 2021-08-12 PROCEDURE — 3288F PR FALLS RISK ASSESSMENT DOCUMENTED: ICD-10-PCS | Mod: CPTII,S$GLB,, | Performed by: PODIATRIST

## 2021-08-12 PROCEDURE — 1125F PR PAIN SEVERITY QUANTIFIED, PAIN PRESENT: ICD-10-PCS | Mod: CPTII,S$GLB,, | Performed by: PODIATRIST

## 2021-08-16 ENCOUNTER — PROCEDURE VISIT (OUTPATIENT)
Dept: OPHTHALMOLOGY | Facility: CLINIC | Age: 86
End: 2021-08-16
Payer: MEDICARE

## 2021-08-16 DIAGNOSIS — H35.033 HYPERTENSIVE RETINOPATHY, BILATERAL: ICD-10-CM

## 2021-08-16 DIAGNOSIS — H35.3221 EXUDATIVE AGE-RELATED MACULAR DEGENERATION OF LEFT EYE WITH ACTIVE CHOROIDAL NEOVASCULARIZATION: ICD-10-CM

## 2021-08-16 DIAGNOSIS — H11.001 PTERYGIUM, RIGHT: ICD-10-CM

## 2021-08-16 DIAGNOSIS — H35.3212 EXUDATIVE AGE-RELATED MACULAR DEGENERATION OF RIGHT EYE WITH INACTIVE CHOROIDAL NEOVASCULARIZATION: Primary | ICD-10-CM

## 2021-08-16 DIAGNOSIS — H43.823 VITREOMACULAR ADHESION, BILATERAL: ICD-10-CM

## 2021-08-16 PROCEDURE — 92014 PR EYE EXAM, EST PATIENT,COMPREHESV: ICD-10-PCS | Mod: S$GLB,,, | Performed by: OPHTHALMOLOGY

## 2021-08-16 PROCEDURE — 92201 OPSCPY EXTND RTA DRAW UNI/BI: CPT | Mod: S$GLB,,, | Performed by: OPHTHALMOLOGY

## 2021-08-16 PROCEDURE — 92201 PR OPHTHALMOSCOPY, EXT, W/RET DRAW/SCLERAL DEPR, I&R, UNI/BI: ICD-10-PCS | Mod: S$GLB,,, | Performed by: OPHTHALMOLOGY

## 2021-08-16 PROCEDURE — 92014 COMPRE OPH EXAM EST PT 1/>: CPT | Mod: S$GLB,,, | Performed by: OPHTHALMOLOGY

## 2021-08-16 RX ORDER — LATANOPROST 50 UG/ML
1 SOLUTION/ DROPS OPHTHALMIC NIGHTLY
Qty: 2.5 ML | Refills: 12 | Status: SHIPPED | OUTPATIENT
Start: 2021-08-16 | End: 2021-09-14 | Stop reason: SDUPTHER

## 2021-08-16 RX ORDER — GLIPIZIDE 5 MG/1
5 TABLET ORAL EVERY MORNING
COMMUNITY
Start: 2021-08-08 | End: 2022-05-06

## 2021-09-14 ENCOUNTER — OFFICE VISIT (OUTPATIENT)
Dept: OPHTHALMOLOGY | Facility: CLINIC | Age: 86
End: 2021-09-14
Payer: MEDICARE

## 2021-09-14 DIAGNOSIS — H43.823 VITREOMACULAR ADHESION, BILATERAL: ICD-10-CM

## 2021-09-14 DIAGNOSIS — H35.033 HYPERTENSIVE RETINOPATHY, BILATERAL: ICD-10-CM

## 2021-09-14 DIAGNOSIS — H11.001 PTERYGIUM, RIGHT: ICD-10-CM

## 2021-09-14 DIAGNOSIS — H35.3221 EXUDATIVE AGE-RELATED MACULAR DEGENERATION OF LEFT EYE WITH ACTIVE CHOROIDAL NEOVASCULARIZATION: ICD-10-CM

## 2021-09-14 DIAGNOSIS — H35.3212 EXUDATIVE AGE-RELATED MACULAR DEGENERATION OF RIGHT EYE WITH INACTIVE CHOROIDAL NEOVASCULARIZATION: Primary | ICD-10-CM

## 2021-09-14 PROCEDURE — 3288F FALL RISK ASSESSMENT DOCD: CPT | Mod: CPTII,S$GLB,, | Performed by: OPHTHALMOLOGY

## 2021-09-14 PROCEDURE — 1101F PR PT FALLS ASSESS DOC 0-1 FALLS W/OUT INJ PAST YR: ICD-10-PCS | Mod: CPTII,S$GLB,, | Performed by: OPHTHALMOLOGY

## 2021-09-14 PROCEDURE — 99214 PR OFFICE/OUTPT VISIT, EST, LEVL IV, 30-39 MIN: ICD-10-PCS | Mod: S$GLB,,, | Performed by: OPHTHALMOLOGY

## 2021-09-14 PROCEDURE — 1159F PR MEDICATION LIST DOCUMENTED IN MEDICAL RECORD: ICD-10-PCS | Mod: CPTII,S$GLB,, | Performed by: OPHTHALMOLOGY

## 2021-09-14 PROCEDURE — 1126F PR PAIN SEVERITY QUANTIFIED, NO PAIN PRESENT: ICD-10-PCS | Mod: CPTII,S$GLB,, | Performed by: OPHTHALMOLOGY

## 2021-09-14 PROCEDURE — 99999 PR PBB SHADOW E&M-EST. PATIENT-LVL III: ICD-10-PCS | Mod: PBBFAC,,, | Performed by: OPHTHALMOLOGY

## 2021-09-14 PROCEDURE — 92285 EXTERNAL OCULAR PHOTOGRAPHY: CPT | Mod: S$GLB,,, | Performed by: OPHTHALMOLOGY

## 2021-09-14 PROCEDURE — 99214 OFFICE O/P EST MOD 30 MIN: CPT | Mod: S$GLB,,, | Performed by: OPHTHALMOLOGY

## 2021-09-14 PROCEDURE — 1126F AMNT PAIN NOTED NONE PRSNT: CPT | Mod: CPTII,S$GLB,, | Performed by: OPHTHALMOLOGY

## 2021-09-14 PROCEDURE — 99999 PR PBB SHADOW E&M-EST. PATIENT-LVL III: CPT | Mod: PBBFAC,,, | Performed by: OPHTHALMOLOGY

## 2021-09-14 PROCEDURE — 92285 SLIT LAMP PHOTOGRAPHY - OU - BOTH EYES: ICD-10-PCS | Mod: S$GLB,,, | Performed by: OPHTHALMOLOGY

## 2021-09-14 PROCEDURE — 1159F MED LIST DOCD IN RCRD: CPT | Mod: CPTII,S$GLB,, | Performed by: OPHTHALMOLOGY

## 2021-09-14 PROCEDURE — 1101F PT FALLS ASSESS-DOCD LE1/YR: CPT | Mod: CPTII,S$GLB,, | Performed by: OPHTHALMOLOGY

## 2021-09-14 PROCEDURE — 3288F PR FALLS RISK ASSESSMENT DOCUMENTED: ICD-10-PCS | Mod: CPTII,S$GLB,, | Performed by: OPHTHALMOLOGY

## 2021-09-14 RX ORDER — LATANOPROST 50 UG/ML
1 SOLUTION/ DROPS OPHTHALMIC NIGHTLY
Qty: 2.5 ML | Refills: 12 | Status: SHIPPED | OUTPATIENT
Start: 2021-09-14 | End: 2022-10-11

## 2021-09-17 ENCOUNTER — TELEPHONE (OUTPATIENT)
Dept: PULMONOLOGY | Facility: CLINIC | Age: 86
End: 2021-09-17

## 2021-09-27 ENCOUNTER — LAB VISIT (OUTPATIENT)
Dept: LAB | Facility: HOSPITAL | Age: 86
End: 2021-09-27
Attending: INTERNAL MEDICINE
Payer: MEDICARE

## 2021-09-27 ENCOUNTER — OFFICE VISIT (OUTPATIENT)
Dept: PULMONOLOGY | Facility: CLINIC | Age: 86
End: 2021-09-27
Payer: MEDICARE

## 2021-09-27 VITALS
BODY MASS INDEX: 32.18 KG/M2 | RESPIRATION RATE: 16 BRPM | SYSTOLIC BLOOD PRESSURE: 136 MMHG | WEIGHT: 153.31 LBS | DIASTOLIC BLOOD PRESSURE: 74 MMHG | HEIGHT: 58 IN | OXYGEN SATURATION: 96 % | HEART RATE: 77 BPM

## 2021-09-27 DIAGNOSIS — J45.50 SEVERE PERSISTENT ASTHMA WITHOUT COMPLICATION: ICD-10-CM

## 2021-09-27 DIAGNOSIS — J45.51 SEVERE PERSISTENT ASTHMA WITH ACUTE EXACERBATION: Primary | ICD-10-CM

## 2021-09-27 DIAGNOSIS — R09.89 CHRONIC SINUS COMPLAINTS: ICD-10-CM

## 2021-09-27 DIAGNOSIS — Z71.85 VACCINE COUNSELING: ICD-10-CM

## 2021-09-27 PROCEDURE — 99999 PR PBB SHADOW E&M-EST. PATIENT-LVL III: ICD-10-PCS | Mod: PBBFAC,,, | Performed by: INTERNAL MEDICINE

## 2021-09-27 PROCEDURE — 99999 PR PBB SHADOW E&M-EST. PATIENT-LVL III: CPT | Mod: PBBFAC,,, | Performed by: INTERNAL MEDICINE

## 2021-09-27 PROCEDURE — 1101F PT FALLS ASSESS-DOCD LE1/YR: CPT | Mod: CPTII,S$GLB,, | Performed by: INTERNAL MEDICINE

## 2021-09-27 PROCEDURE — 3288F FALL RISK ASSESSMENT DOCD: CPT | Mod: CPTII,S$GLB,, | Performed by: INTERNAL MEDICINE

## 2021-09-27 PROCEDURE — 1101F PR PT FALLS ASSESS DOC 0-1 FALLS W/OUT INJ PAST YR: ICD-10-PCS | Mod: CPTII,S$GLB,, | Performed by: INTERNAL MEDICINE

## 2021-09-27 PROCEDURE — 3288F PR FALLS RISK ASSESSMENT DOCUMENTED: ICD-10-PCS | Mod: CPTII,S$GLB,, | Performed by: INTERNAL MEDICINE

## 2021-09-27 PROCEDURE — 3072F LOW RISK FOR RETINOPATHY: CPT | Mod: CPTII,S$GLB,, | Performed by: INTERNAL MEDICINE

## 2021-09-27 PROCEDURE — 3072F PR LOW RISK FOR RETINOPATHY: ICD-10-PCS | Mod: CPTII,S$GLB,, | Performed by: INTERNAL MEDICINE

## 2021-09-27 PROCEDURE — 99214 OFFICE O/P EST MOD 30 MIN: CPT | Mod: S$GLB,,, | Performed by: INTERNAL MEDICINE

## 2021-09-27 PROCEDURE — 36415 COLL VENOUS BLD VENIPUNCTURE: CPT | Performed by: INTERNAL MEDICINE

## 2021-09-27 PROCEDURE — 86769 SARS-COV-2 COVID-19 ANTIBODY: CPT | Performed by: INTERNAL MEDICINE

## 2021-09-27 PROCEDURE — 99214 PR OFFICE/OUTPT VISIT, EST, LEVL IV, 30-39 MIN: ICD-10-PCS | Mod: S$GLB,,, | Performed by: INTERNAL MEDICINE

## 2021-09-27 RX ORDER — BENZONATATE 200 MG/1
200 CAPSULE ORAL 3 TIMES DAILY PRN
Qty: 90 CAPSULE | Refills: 3 | Status: SHIPPED | OUTPATIENT
Start: 2021-09-27 | End: 2021-10-07

## 2021-09-27 RX ORDER — PREDNISONE 20 MG/1
20 TABLET ORAL DAILY
Qty: 3 TABLET | Refills: 3 | Status: SHIPPED | OUTPATIENT
Start: 2021-09-27 | End: 2021-09-30

## 2021-09-27 RX ORDER — MONTELUKAST SODIUM 10 MG/1
10 TABLET ORAL NIGHTLY
Qty: 30 TABLET | Refills: 11 | Status: SHIPPED | OUTPATIENT
Start: 2021-09-27

## 2021-09-27 RX ORDER — ALBUTEROL SULFATE 90 UG/1
AEROSOL, METERED RESPIRATORY (INHALATION)
Qty: 18 G | Refills: 11 | Status: SHIPPED | OUTPATIENT
Start: 2021-09-27

## 2021-09-27 RX ORDER — FLUTICASONE FUROATE AND VILANTEROL TRIFENATATE 200; 25 UG/1; UG/1
1 POWDER RESPIRATORY (INHALATION) DAILY
Qty: 1 EACH | Refills: 11 | Status: SHIPPED | OUTPATIENT
Start: 2021-09-27

## 2021-09-28 LAB
SARS-COV-2 IGG SERPL IA-ACNC: 473.5 AU/ML
SARS-COV-2 IGG SERPL QL IA: POSITIVE

## 2021-10-01 ENCOUNTER — PATIENT MESSAGE (OUTPATIENT)
Dept: PULMONOLOGY | Facility: CLINIC | Age: 86
End: 2021-10-01

## 2021-10-07 ENCOUNTER — IMMUNIZATION (OUTPATIENT)
Dept: PRIMARY CARE CLINIC | Facility: CLINIC | Age: 86
End: 2021-10-07
Payer: MEDICARE

## 2021-10-07 DIAGNOSIS — Z23 NEED FOR VACCINATION: Primary | ICD-10-CM

## 2021-10-07 PROCEDURE — 91300 COVID-19, MRNA, LNP-S, PF, 30 MCG/0.3 ML DOSE VACCINE: CPT | Mod: S$GLB,,, | Performed by: FAMILY MEDICINE

## 2021-10-07 PROCEDURE — 0003A COVID-19, MRNA, LNP-S, PF, 30 MCG/0.3 ML DOSE VACCINE: ICD-10-PCS | Mod: S$GLB,,, | Performed by: FAMILY MEDICINE

## 2021-10-07 PROCEDURE — 91300 COVID-19, MRNA, LNP-S, PF, 30 MCG/0.3 ML DOSE VACCINE: ICD-10-PCS | Mod: S$GLB,,, | Performed by: FAMILY MEDICINE

## 2021-10-07 PROCEDURE — 0003A COVID-19, MRNA, LNP-S, PF, 30 MCG/0.3 ML DOSE VACCINE: CPT | Mod: S$GLB,,, | Performed by: FAMILY MEDICINE

## 2022-01-27 ENCOUNTER — OFFICE VISIT (OUTPATIENT)
Dept: PULMONOLOGY | Facility: CLINIC | Age: 87
End: 2022-01-27
Payer: MEDICARE

## 2022-01-27 VITALS
HEIGHT: 58 IN | WEIGHT: 150.13 LBS | SYSTOLIC BLOOD PRESSURE: 136 MMHG | OXYGEN SATURATION: 99 % | RESPIRATION RATE: 16 BRPM | HEART RATE: 67 BPM | BODY MASS INDEX: 31.51 KG/M2 | DIASTOLIC BLOOD PRESSURE: 67 MMHG

## 2022-01-27 DIAGNOSIS — J45.20 MILD INTERMITTENT ASTHMA WITHOUT COMPLICATION: Primary | ICD-10-CM

## 2022-01-27 DIAGNOSIS — R09.89 CHRONIC SINUS COMPLAINTS: ICD-10-CM

## 2022-01-27 PROCEDURE — 1126F PR PAIN SEVERITY QUANTIFIED, NO PAIN PRESENT: ICD-10-PCS | Mod: CPTII,S$GLB,, | Performed by: INTERNAL MEDICINE

## 2022-01-27 PROCEDURE — 1159F MED LIST DOCD IN RCRD: CPT | Mod: CPTII,S$GLB,, | Performed by: INTERNAL MEDICINE

## 2022-01-27 PROCEDURE — 99999 PR PBB SHADOW E&M-EST. PATIENT-LVL V: ICD-10-PCS | Mod: PBBFAC,,, | Performed by: INTERNAL MEDICINE

## 2022-01-27 PROCEDURE — 1159F PR MEDICATION LIST DOCUMENTED IN MEDICAL RECORD: ICD-10-PCS | Mod: CPTII,S$GLB,, | Performed by: INTERNAL MEDICINE

## 2022-01-27 PROCEDURE — 99214 OFFICE O/P EST MOD 30 MIN: CPT | Mod: S$GLB,,, | Performed by: INTERNAL MEDICINE

## 2022-01-27 PROCEDURE — 99999 PR PBB SHADOW E&M-EST. PATIENT-LVL V: CPT | Mod: PBBFAC,,, | Performed by: INTERNAL MEDICINE

## 2022-01-27 PROCEDURE — 99214 PR OFFICE/OUTPT VISIT, EST, LEVL IV, 30-39 MIN: ICD-10-PCS | Mod: S$GLB,,, | Performed by: INTERNAL MEDICINE

## 2022-01-27 PROCEDURE — 1126F AMNT PAIN NOTED NONE PRSNT: CPT | Mod: CPTII,S$GLB,, | Performed by: INTERNAL MEDICINE

## 2022-01-27 RX ORDER — DUPILUMAB 300 MG/2ML
INJECTION, SOLUTION SUBCUTANEOUS
COMMUNITY
Start: 2022-01-20

## 2022-01-27 NOTE — PROGRESS NOTES
1/27/2022    Jeanette Evans  Follow up    Chief Complaint   Patient presents with    Follow-up    Cough     Post COVID       HPI:   01/27/2022- pt started dupixent per allergist (for nasal allergies) and doing well. Had covid and minimal symptoms but still went to  and got prednisone 5d treatment.  Continues on breo inhaler daily, not needing rescue inhaler. Not coughing.    09/27/2021-   Continue breo inhaler one puff daily  Albuterol inhaler as needed  Nebulizer as needed  Continue singulair  If flare up of asthma- take prednisone 20mg one pill daily for 3 days, repeat as needed  Tessalon as needed for cough  Check covid antibodies- then can decide whether you want to get 3rd covid shot  When flu shot available recommended to get it  pt here with daughter who assists w/ history. Coughing 2 wks, same as , now getting better. Took prednisone, steroid shot from pcp. covid negative  Uses breo every day. Needs more rescue inhaler- uses only when sick. Has nebulizer as well. Went almost 2 yrs since last exacerbation.  Uses flonase daily. singulair tabs nightly.  No night wakenings w/ asthma. Sometimes coughs at night. Better w/ tessalon. She stays very active and likes to cook. Traveled to [facility] June 2021.  She wants to get 3rd covid shot and flu shot soon      02/26/2020-   Continue prednisone 1/2 tab a day for 3 more days then stop  Tessalon ordered for cough  Can use Mucinex for congestion as needed  Continue breo inhaler  New nebulizer ordered- w/ albuterol- use as needed  No more antibiotics needed- probably viral infection, getting better  Cough can last 6-8 weeks after a cold, continue symptomatic treatments and should get better with time  Hx given by daughter. coughing x 10 days, initially w/ green & brown phlegm.  Cough worse at night. A/w chest tightness and shortness of breath but now better.  Had contact w/ sick relative.  Cough is getting better now- less phlegm now dry cough. Taking  ""emergency kit" which is azithromycin, prednisone and albuterol inhaler. Reports nebulizer machine is broken. She has had flu shot and pneumonia shot (4 yrs ago). Last year did well w/o exacerbation, this is the first one in a long time.   Inhalers: breo (daily), albuterol (only when sick)    Sept 25, 2019-  singulair might be enough - controls sinuses and asthma,  Breathing test normal range.  Would continue singulair into future.  breo might not be needed?  Would use til April next yr- if no cough/short breath consider skipping.  Follow here as needed - refils with Dr Killian but call if any problems?  No symptoms, sinuses good and no resp, uses breo and singulair , no rescue, pft nl range      April 23, 2019-some english speaking from [place], with daughter, never smoker, functions with no limits, has macular degeneration.  Pt was .  Has cough and wheezes and sob is minimal.  Seasonal variations with worse in fall and cold weather.  Sees Dr Killian - no steroid shots, pt's daughter had asthma. Pt had no problems til adult life, worsening last 5 yrs.  Pt forgets to use symbicort, uses prn resp rx mainly.  Prednisone use maybe 3times doc visits last yr.  No er visits nor admits.    Pt chr sinus drip and headaches.  Clear mucous.  Smell is good.not clearly cause lung problems?  Vague intermittent right upper abd or lower chest wall pain off and on  Yrs.    Patient Instructions   Chronic sinus- use Singulair daily and use flonase 1-2 daily if drainage  Persistent asthma - severe-- breathing will worsen with asthma and improve.  Should be normal between.  Common for chronic disease to be under appreciated.   Preventive- breo once daily and singulair daily   Rescue - use albuterol inhaler 1-3 as needed or nebulizer up to every 4 hours as needed   Action plan- if albuterol not working nor lasting, or having bad symptoms - take prednisone daily for 3 days - may repeat if not controlled.       - " "may use azithromycin if infection suspected - a pill daily for 3 days will last 10days  Best to treat asthma cough with asthma therapy.  Check chest xray with right side chest/upper abdominal pain ongoing last wk to years?  Check lung capacity on good day.  Seasonal use medications may be reasonable  The chief compliant  problem is varies w/ instability at times  PFSH:  Past Medical History:   Diagnosis Date    Diabetes mellitus type II     GERD (gastroesophageal reflux disease)     Hypertension          Past Surgical History:   Procedure Laterality Date    CATARACT EXTRACTION      CHOLECYSTECTOMY       Social History     Tobacco Use    Smoking status: Never Smoker    Smokeless tobacco: Never Used   Substance Use Topics    Alcohol use: No     Family History   Problem Relation Age of Onset    Cancer Mother     Heart disease Father     Amblyopia Neg Hx     Blindness Neg Hx     Cataracts Neg Hx     Glaucoma Neg Hx     Macular degeneration Neg Hx     Retinal detachment Neg Hx     Strabismus Neg Hx      Review of patient's allergies indicates:   Allergen Reactions    Pcn [penicillins] Anaphylaxis     Other reaction(s): Anaphylaxis       Performance Status:The patient's activity level is no limits with regular activity.      Review of Systems:  a review of eleven systems covering constitutional, Eye, HEENT, Psych, Respiratory, Cardiac, GI, , Musculoskeletal, Endocrine, Dermatologic was negative except for pertinent findings as listed ABOVE and below:    All negative with pertinent positives as above       Exam:Comprehensive exam done. /67 (BP Location: Right arm, Patient Position: Sitting, BP Method: Large (Automatic))   Pulse 67   Resp 16   Ht 4' 10" (1.473 m)   Wt 68.1 kg (150 lb 2.1 oz)   LMP  (LMP Unknown)   SpO2 99% Comment: on room air at rest  BMI 31.38 kg/m²   Exam included Vitals as listed, and patient's appearance and affect and alertness and mood, oral exam for yeast and hygiene " and pharynx lesions and Mallapatti (M) score, neck with inspection for jvd and masses and thyroid abnormalities and lymph nodes (supraclavicular and infraclavicular nodes and axillary also examined and noted if abn), chest exam included symmetry and effort and fremitus and percussion and auscultation, cardiac exam included rhythm and gallops and murmur and rubs and jvd and edema, abdominal exam for mass and hepatosplenomegaly and tenderness and hernias and bowel sounds, Musculoskeletal exam with muscle tone and posture and mobility/gait and  strength, and skin for rashes and cyanosis and pallor and turgor, extremity for clubbing.  Findings were normal except for pertinent findings listed below:  M3  Lungs clear  No edema      Radiographs (ct chest and cxr) reviewed: view by direct vision   CXR 4/2021- elevated L hemidiaphragm   cxr 4/2019 elevated left high dipahragm    Labs none      PFT results reviewed- with no obstruction.  Restriction on lung volume testing      Plan:  Clinical impression is apparently straight forward and impression with management as below. Asthma, allergies stable with recent mild COVID 19 infection    Jeanette was seen today for follow-up and cough.    Diagnoses and all orders for this visit:    Mild intermittent asthma without complication    Chronic sinus complaints        No follow-ups on file.    Discussed with patient above for education the following:      Patient Instructions   Continue inhaler regimen, nebulizer as needed  Continue singulair  Allergist follow up

## 2022-04-04 ENCOUNTER — OFFICE VISIT (OUTPATIENT)
Dept: OPHTHALMOLOGY | Facility: CLINIC | Age: 87
End: 2022-04-04
Payer: MEDICARE

## 2022-04-04 DIAGNOSIS — H43.823 VITREOMACULAR ADHESION, BILATERAL: ICD-10-CM

## 2022-04-04 DIAGNOSIS — H35.033 HYPERTENSIVE RETINOPATHY, BILATERAL: ICD-10-CM

## 2022-04-04 DIAGNOSIS — H35.3212 EXUDATIVE AGE-RELATED MACULAR DEGENERATION OF RIGHT EYE WITH INACTIVE CHOROIDAL NEOVASCULARIZATION: ICD-10-CM

## 2022-04-04 DIAGNOSIS — H35.3221 EXUDATIVE AGE-RELATED MACULAR DEGENERATION OF LEFT EYE WITH ACTIVE CHOROIDAL NEOVASCULARIZATION: Primary | ICD-10-CM

## 2022-04-04 PROCEDURE — 92014 PR EYE EXAM, EST PATIENT,COMPREHESV: ICD-10-PCS | Mod: S$GLB,,, | Performed by: OPHTHALMOLOGY

## 2022-04-04 PROCEDURE — 1101F PT FALLS ASSESS-DOCD LE1/YR: CPT | Mod: CPTII,S$GLB,, | Performed by: OPHTHALMOLOGY

## 2022-04-04 PROCEDURE — 1160F PR REVIEW ALL MEDS BY PRESCRIBER/CLIN PHARMACIST DOCUMENTED: ICD-10-PCS | Mod: CPTII,S$GLB,, | Performed by: OPHTHALMOLOGY

## 2022-04-04 PROCEDURE — 3288F FALL RISK ASSESSMENT DOCD: CPT | Mod: CPTII,S$GLB,, | Performed by: OPHTHALMOLOGY

## 2022-04-04 PROCEDURE — 92134 POSTERIOR SEGMENT OCT RETINA (OCULAR COHERENCE TOMOGRAPHY)-BOTH EYES: ICD-10-PCS | Mod: S$GLB,,, | Performed by: OPHTHALMOLOGY

## 2022-04-04 PROCEDURE — 99999 PR PBB SHADOW E&M-EST. PATIENT-LVL IV: ICD-10-PCS | Mod: PBBFAC,,, | Performed by: OPHTHALMOLOGY

## 2022-04-04 PROCEDURE — 1126F PR PAIN SEVERITY QUANTIFIED, NO PAIN PRESENT: ICD-10-PCS | Mod: CPTII,S$GLB,, | Performed by: OPHTHALMOLOGY

## 2022-04-04 PROCEDURE — 2023F PR DILATED RETINAL EXAM W/O EVID OF RETINOPATHY: ICD-10-PCS | Mod: CPTII,S$GLB,, | Performed by: OPHTHALMOLOGY

## 2022-04-04 PROCEDURE — 99999 PR PBB SHADOW E&M-EST. PATIENT-LVL IV: CPT | Mod: PBBFAC,,, | Performed by: OPHTHALMOLOGY

## 2022-04-04 PROCEDURE — 1159F MED LIST DOCD IN RCRD: CPT | Mod: CPTII,S$GLB,, | Performed by: OPHTHALMOLOGY

## 2022-04-04 PROCEDURE — 92134 CPTRZ OPH DX IMG PST SGM RTA: CPT | Mod: S$GLB,,, | Performed by: OPHTHALMOLOGY

## 2022-04-04 PROCEDURE — 2023F DILAT RTA XM W/O RTNOPTHY: CPT | Mod: CPTII,S$GLB,, | Performed by: OPHTHALMOLOGY

## 2022-04-04 PROCEDURE — 92201 PR OPHTHALMOSCOPY, EXT, W/RET DRAW/SCLERAL DEPR, I&R, UNI/BI: ICD-10-PCS | Mod: S$GLB,,, | Performed by: OPHTHALMOLOGY

## 2022-04-04 PROCEDURE — 1126F AMNT PAIN NOTED NONE PRSNT: CPT | Mod: CPTII,S$GLB,, | Performed by: OPHTHALMOLOGY

## 2022-04-04 PROCEDURE — 92201 OPSCPY EXTND RTA DRAW UNI/BI: CPT | Mod: S$GLB,,, | Performed by: OPHTHALMOLOGY

## 2022-04-04 PROCEDURE — 92014 COMPRE OPH EXAM EST PT 1/>: CPT | Mod: S$GLB,,, | Performed by: OPHTHALMOLOGY

## 2022-04-04 PROCEDURE — 1159F PR MEDICATION LIST DOCUMENTED IN MEDICAL RECORD: ICD-10-PCS | Mod: CPTII,S$GLB,, | Performed by: OPHTHALMOLOGY

## 2022-04-04 PROCEDURE — 1160F RVW MEDS BY RX/DR IN RCRD: CPT | Mod: CPTII,S$GLB,, | Performed by: OPHTHALMOLOGY

## 2022-04-04 PROCEDURE — 1101F PR PT FALLS ASSESS DOC 0-1 FALLS W/OUT INJ PAST YR: ICD-10-PCS | Mod: CPTII,S$GLB,, | Performed by: OPHTHALMOLOGY

## 2022-04-04 PROCEDURE — 3288F PR FALLS RISK ASSESSMENT DOCUMENTED: ICD-10-PCS | Mod: CPTII,S$GLB,, | Performed by: OPHTHALMOLOGY

## 2022-04-04 NOTE — PROGRESS NOTES
HPI     DLS  8/16/2021    Patient her today with concerns of fluctuating VA ou. No pain. No f/f.    Latanoprost ou qd    Last edited by Edilia Rai on 4/4/2022 10:15 AM. (History)        HPI     DLS: 02/23/21 Dr Davidson       Patient c/o blurry VA and mild pain in the left eye. No other ocular   complaints.      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 HTN RET OU    3. NS OD - no Sx because of #1  PCIOL OS    4. Pterygia OD  Increased vascularity with gelatinous appearance - Had eval with Dr. Calero - low risk for neoplasia    5. ?POAG in past  Was on Xalatan  Had stopped  IOP ok , can hold IOP gtts for now.  If IOP increases, can resume down the road        12 months OCT

## 2022-04-15 NOTE — PLAN OF CARE
Problem: Patient Care Overview  Goal: Plan of Care Review  Outcome: Ongoing (interventions implemented as appropriate)  Aerosol txs Q4 hrs with Acapella, Breo not given per patient refused       Wound RN Consult: I am asked to see this patient for pressure injury of coccyx and buttocks.     Chadd Mclaughlin is at high risk of pressure injury. Due to severity of illness and intubation he has been in bed for an extended period of time during this admission. TAPS system in place with offloading wedges. Incontinence of both urine and stool increase risk of skin breakdown. The wounds on his coccyx and \"kissing\" wounds on bilateral buttocks are caused by multiple factors including pressure, moisture, friction/sheer.     Barrier cream with zinc will provide protection from moisture and help prevent friction and sheer. Continued use of TAPS system with wedges for Q2 hour turns will help prevent breakdown from pressure.     Admit Date: 4/7/2022  Admit Diagnosis(es):   Acute respiratory failure, unspecified whether with hypoxia or hypercapnia (CMS/[facility]) [J96.00]      History of Present Illness: (per oncology note)   Chadd Mclaughlin is a 57 year old male. Past medical history significant for cerebral palsy, hypertension, and hyperlipidemia.     Admitted April 7th with respiratory distress.  He was initially managed with BiPAP but ultimately intubated, now extubated April 15th.  He has been managed with Infectious Disease for diffuse alveolar hemorrhage in questionable interstitial pneumonia.  Currently on cefepime. His imaging also identified pulmonary embolism.  CT angiogram April 8th with acute pulmonary emboli in the right lung with an overall small PE burden.  Elevated RV to LV ratio suggestive of right heart strain.  Bilateral lower lobe and peripheral predominant airspace opacities suspicious for atypical pneumonia.       He has been initiated on anticoagulation.  Due to his respiratory failure has also been on pulse dose steroids since April 9th and continues on 30 mg Solu-Medrol daily.  Procalcitonin over this time as reduced from 5.4 on April 8th to 0.4 on April 13th.  Hematology was consulted for leukocytosis.     Since  April 7th, patient has had ANC of 21.8 which has remained elevated.  On April 14th ANC was 35.5 .  He has mild anemia.  No thrombocytopenia.               Past Medical History:   Diagnosis Date   • Cerebral palsy (CMS/HCC)     L sided spasticity, mild   • Colon polyps 2/19/20116   • Essential hypertension 8/29/2017   • Gout attack 2015   • History of seizure disorder     last seizure age 6 yr old   • Hypertriglyceridemia without hypercholesterolemia 6/6/2017    , HDL 22   • Ureteral calculi 2000,2003,2009     Past Surgical History:   Procedure Laterality Date   • Colonoscopy w biopsy  2/19/2016    Tubular adenoma- next exam due in 5 years- Dr Lew   • Colonoscopy w biopsy  02/25/2021    Tubular Adenoma Polyps, Hemorrhoids - next exam due in 5 years- Dr Lew   • Cystourethroscopy /lithotripsy  2009    L sided stone       Labs:     Albumin (g/dL)   Date Value   04/10/2022 1.6 (L)     Glucose (mg/dL)   Date Value   04/15/2022 169 (H)     Hemoglobin A1C (%)   Date Value   02/24/2022 5.5       Assessment    Baseline activity: unknown at time of assessment  Orientation: A&Ox3-4  Independent at home: Yes  Admit Bashir Score: 12  Current Bashir Score: 10  Is patient at high risk for pressure injury development? Yes  BMI: Body mass index is 25.46 kg/m².         Wounds  Multiple, scattered wounds of coccyx, bilateral buttocks measured together 4.8 cm x 2 cm x 0.1 cm. Slough, pink areas in each, periwound erythema, blanchable.     Overview coccyx, bilateral buttocks      Coccyx, bilateral buttocks      Patient Education: explained use of barrier cream.       Wound Care RN Recommendations:  1. Calazime barrier cream to buttocks,three times a day and as needed after incontinence episodes. Do not rub all of cream off each time, just wipe gently and apply new thick layer of cream. This will protect against incontinence, but also friction/shearing.   2. Aggressive offloading to ensure wound does not worsen. Reposition every  2 hours and as needed at a minimum. Use patron to assist with repositioning and transfers.  3. Use chair cushion at all times while up in chair.  4. Place pillows under ankles or bilateral prevalon boots to off-load heels at all times while in bed.  5. Generalized moisturizer to dry skin daily and as needed  6. Encourage protein intake to promote wound healing per dietician recommendations.    Recommended Devices: Heel off loading with pillow(s)       Thank you for allowing me to participate in the patient's care.    Time Spent for Consult: 22 minutes with patient

## 2022-05-05 PROCEDURE — 99285 EMERGENCY DEPT VISIT HI MDM: CPT

## 2022-05-06 ENCOUNTER — HOSPITAL ENCOUNTER (OUTPATIENT)
Facility: HOSPITAL | Age: 87
Discharge: HOME | End: 2022-05-06
Attending: EMERGENCY MEDICINE | Admitting: INTERNAL MEDICINE
Payer: MEDICARE

## 2022-05-06 ENCOUNTER — CLINICAL SUPPORT (OUTPATIENT)
Dept: CARDIOLOGY | Facility: HOSPITAL | Age: 87
End: 2022-05-06
Payer: MEDICARE

## 2022-05-06 VITALS
HEIGHT: 58 IN | RESPIRATION RATE: 18 BRPM | SYSTOLIC BLOOD PRESSURE: 182 MMHG | BODY MASS INDEX: 32.21 KG/M2 | TEMPERATURE: 98 F | OXYGEN SATURATION: 98 % | DIASTOLIC BLOOD PRESSURE: 76 MMHG | WEIGHT: 153.44 LBS | HEART RATE: 87 BPM

## 2022-05-06 VITALS — HEIGHT: 58 IN | BODY MASS INDEX: 32.12 KG/M2 | WEIGHT: 153 LBS

## 2022-05-06 DIAGNOSIS — J18.9 PNEUMONIA OF RIGHT UPPER LOBE DUE TO INFECTIOUS ORGANISM: Primary | ICD-10-CM

## 2022-05-06 DIAGNOSIS — R05.9 COUGH: Primary | ICD-10-CM

## 2022-05-06 DIAGNOSIS — R42 DIZZINESS: ICD-10-CM

## 2022-05-06 DIAGNOSIS — R42 VERTIGO: ICD-10-CM

## 2022-05-06 DIAGNOSIS — I16.1 HYPERTENSIVE EMERGENCY: ICD-10-CM

## 2022-05-06 DIAGNOSIS — R07.9 CHEST PAIN: ICD-10-CM

## 2022-05-06 PROBLEM — N39.0 UTI (URINARY TRACT INFECTION): Status: ACTIVE | Noted: 2022-05-06

## 2022-05-06 PROBLEM — N39.0 UTI (URINARY TRACT INFECTION): Status: RESOLVED | Noted: 2022-05-06 | Resolved: 2022-05-06

## 2022-05-06 LAB
ALBUMIN SERPL BCP-MCNC: 3.8 G/DL (ref 3.5–5.2)
ALP SERPL-CCNC: 74 U/L (ref 55–135)
ALT SERPL W/O P-5'-P-CCNC: 29 U/L (ref 10–44)
ANION GAP SERPL CALC-SCNC: 13 MMOL/L (ref 8–16)
APTT PPP: 29.2 SEC (ref 23.3–35.1)
AST SERPL-CCNC: 34 U/L (ref 10–40)
AV INDEX (PROSTH): 0.62
AV MEAN GRADIENT: 7 MMHG
AV VALVE AREA: 1.82 CM2
BACTERIA #/AREA URNS HPF: NEGATIVE /HPF
BASOPHILS # BLD AUTO: 0.06 K/UL (ref 0–0.2)
BASOPHILS NFR BLD: 0.5 % (ref 0–1.9)
BILIRUB SERPL-MCNC: 0.7 MG/DL (ref 0.1–1)
BILIRUB UR QL STRIP: NEGATIVE
BNP SERPL-MCNC: 150 PG/ML (ref 0–99)
BSA FOR ECHO PROCEDURE: 1.69 M2
BUN SERPL-MCNC: 23 MG/DL (ref 8–23)
CALCIUM SERPL-MCNC: 9.2 MG/DL (ref 8.7–10.5)
CHLORIDE SERPL-SCNC: 103 MMOL/L (ref 95–110)
CHOLEST SERPL-MCNC: 131 MG/DL (ref 120–199)
CHOLEST/HDLC SERPL: 2.7 {RATIO} (ref 2–5)
CLARITY UR: CLEAR
CO2 SERPL-SCNC: 24 MMOL/L (ref 23–29)
COLOR UR: YELLOW
CREAT SERPL-MCNC: 1.3 MG/DL (ref 0.5–1.4)
CREAT SERPL-MCNC: 1.4 MG/DL (ref 0.5–1.4)
CV ECHO LV RWT: 0.41 CM
DIFFERENTIAL METHOD: ABNORMAL
DOP CALC AO VTI: 41.83 CM
DOP CALC LVOT AREA: 2.9 CM2
DOP CALC LVOT DIAMETER: 1.93 CM
DOP CALC LVOT PEAK VEL: 102.18 M/S
DOP CALC LVOT STROKE VOLUME: 76.23 CM3
DOP CALCLVOT PEAK VEL VTI: 26.07 CM
E WAVE DECELERATION TIME: 297.1 MSEC
E/A RATIO: 0.7
E/E' RATIO: 14.77 M/S
ECHO LV POSTERIOR WALL: 0.81 CM (ref 0.6–1.1)
EJECTION FRACTION: 65 %
EOSINOPHIL # BLD AUTO: 0.2 K/UL (ref 0–0.5)
EOSINOPHIL NFR BLD: 1.7 % (ref 0–8)
ERYTHROCYTE [DISTWIDTH] IN BLOOD BY AUTOMATED COUNT: 15.5 % (ref 11.5–14.5)
EST. GFR  (AFRICAN AMERICAN): 42.6 ML/MIN/1.73 M^2
EST. GFR  (NON AFRICAN AMERICAN): 37 ML/MIN/1.73 M^2
ESTIMATED AVG GLUCOSE: 140 MG/DL (ref 68–131)
FRACTIONAL SHORTENING: 32 % (ref 28–44)
GLUCOSE SERPL-MCNC: 109 MG/DL (ref 70–110)
GLUCOSE SERPL-MCNC: 137 MG/DL (ref 70–110)
GLUCOSE SERPL-MCNC: 146 MG/DL (ref 70–110)
GLUCOSE UR QL STRIP: NEGATIVE
HBA1C MFR BLD: 6.5 % (ref 4.5–6.2)
HCT VFR BLD AUTO: 34.6 % (ref 37–48.5)
HDLC SERPL-MCNC: 49 MG/DL (ref 40–75)
HDLC SERPL: 37.4 % (ref 20–50)
HGB BLD-MCNC: 11.2 G/DL (ref 12–16)
HGB UR QL STRIP: NEGATIVE
HYALINE CASTS #/AREA URNS LPF: 9 /LPF
IMM GRANULOCYTES # BLD AUTO: 0.09 K/UL (ref 0–0.04)
IMM GRANULOCYTES NFR BLD AUTO: 0.7 % (ref 0–0.5)
INR PPP: 1.1
INTERVENTRICULAR SEPTUM: 0.94 CM (ref 0.6–1.1)
KETONES UR QL STRIP: ABNORMAL
LDLC SERPL CALC-MCNC: 72.4 MG/DL (ref 63–159)
LEFT ATRIUM SIZE: 3.7 CM
LEFT INTERNAL DIMENSION IN SYSTOLE: 2.7 CM (ref 2.1–4)
LEFT VENTRICLE MASS INDEX: 64 G/M2
LEFT VENTRICULAR INTERNAL DIMENSION IN DIASTOLE: 3.97 CM (ref 3.5–6)
LEFT VENTRICULAR MASS: 104.25 G
LEUKOCYTE ESTERASE UR QL STRIP: ABNORMAL
LIPASE SERPL-CCNC: 45 U/L (ref 4–60)
LV LATERAL E/E' RATIO: 19.2 M/S
LV SEPTAL E/E' RATIO: 12 M/S
LYMPHOCYTES # BLD AUTO: 2.2 K/UL (ref 1–4.8)
LYMPHOCYTES NFR BLD: 18.1 % (ref 18–48)
MAGNESIUM SERPL-MCNC: 2.1 MG/DL (ref 1.6–2.6)
MCH RBC QN AUTO: 25.2 PG (ref 27–31)
MCHC RBC AUTO-ENTMCNC: 32.4 G/DL (ref 32–36)
MCV RBC AUTO: 78 FL (ref 82–98)
MICROSCOPIC COMMENT: ABNORMAL
MONOCYTES # BLD AUTO: 1 K/UL (ref 0.3–1)
MONOCYTES NFR BLD: 8.3 % (ref 4–15)
MV PEAK A VEL: 1.38 M/S
MV PEAK E VEL: 0.96 M/S
NEUTROPHILS # BLD AUTO: 8.5 K/UL (ref 1.8–7.7)
NEUTROPHILS NFR BLD: 70.7 % (ref 38–73)
NITRITE UR QL STRIP: NEGATIVE
NONHDLC SERPL-MCNC: 82 MG/DL
NRBC BLD-RTO: 0 /100 WBC
PH UR STRIP: 6 [PH] (ref 5–8)
PLATELET # BLD AUTO: 322 K/UL (ref 150–450)
PMV BLD AUTO: 9.7 FL (ref 9.2–12.9)
POTASSIUM SERPL-SCNC: 4.2 MMOL/L (ref 3.5–5.1)
PROCALCITONIN SERPL IA-MCNC: <0.05 NG/ML (ref 0–0.5)
PROT SERPL-MCNC: 7.4 G/DL (ref 6–8.4)
PROT UR QL STRIP: NEGATIVE
PROTHROMBIN TIME: 13.3 SEC (ref 11.4–13.7)
RBC # BLD AUTO: 4.44 M/UL (ref 4–5.4)
RBC #/AREA URNS HPF: 4 /HPF (ref 0–4)
SAMPLE: NORMAL
SARS-COV-2 RDRP RESP QL NAA+PROBE: NEGATIVE
SODIUM SERPL-SCNC: 140 MMOL/L (ref 136–145)
SP GR UR STRIP: >1.03 (ref 1–1.03)
SQUAMOUS #/AREA URNS HPF: 1 /HPF
TDI LATERAL: 0.05 M/S
TDI SEPTAL: 0.08 M/S
TDI: 0.07 M/S
TRICUSPID ANNULAR PLANE SYSTOLIC EXCURSION: 200 CM
TRIGL SERPL-MCNC: 48 MG/DL (ref 30–150)
TROPONIN I SERPL DL<=0.01 NG/ML-MCNC: <0.03 NG/ML
TSH SERPL DL<=0.005 MIU/L-ACNC: 1.61 UIU/ML (ref 0.34–5.6)
URN SPEC COLLECT METH UR: ABNORMAL
UROBILINOGEN UR STRIP-ACNC: NEGATIVE EU/DL
WBC # BLD AUTO: 12.03 K/UL (ref 3.9–12.7)
WBC #/AREA URNS HPF: >100 /HPF (ref 0–5)

## 2022-05-06 PROCEDURE — 25500020 PHARM REV CODE 255: Performed by: EMERGENCY MEDICINE

## 2022-05-06 PROCEDURE — 97116 GAIT TRAINING THERAPY: CPT

## 2022-05-06 PROCEDURE — 93005 ELECTROCARDIOGRAM TRACING: CPT | Performed by: GENERAL PRACTICE

## 2022-05-06 PROCEDURE — 80061 LIPID PANEL: CPT | Performed by: NURSE PRACTITIONER

## 2022-05-06 PROCEDURE — 93010 ELECTROCARDIOGRAM REPORT: CPT | Mod: ,,, | Performed by: GENERAL PRACTICE

## 2022-05-06 PROCEDURE — 83880 ASSAY OF NATRIURETIC PEPTIDE: CPT | Performed by: EMERGENCY MEDICINE

## 2022-05-06 PROCEDURE — 82962 GLUCOSE BLOOD TEST: CPT

## 2022-05-06 PROCEDURE — 84443 ASSAY THYROID STIM HORMONE: CPT | Performed by: NURSE PRACTITIONER

## 2022-05-06 PROCEDURE — 63600175 PHARM REV CODE 636 W HCPCS: Performed by: EMERGENCY MEDICINE

## 2022-05-06 PROCEDURE — 87147 CULTURE TYPE IMMUNOLOGIC: CPT | Performed by: EMERGENCY MEDICINE

## 2022-05-06 PROCEDURE — 83690 ASSAY OF LIPASE: CPT | Performed by: EMERGENCY MEDICINE

## 2022-05-06 PROCEDURE — 93306 ECHO (CUPID ONLY): ICD-10-PCS | Mod: 26,,, | Performed by: INTERNAL MEDICINE

## 2022-05-06 PROCEDURE — 25000003 PHARM REV CODE 250: Performed by: EMERGENCY MEDICINE

## 2022-05-06 PROCEDURE — 92610 EVALUATE SWALLOWING FUNCTION: CPT

## 2022-05-06 PROCEDURE — 84145 PROCALCITONIN (PCT): CPT | Performed by: EMERGENCY MEDICINE

## 2022-05-06 PROCEDURE — 25000003 PHARM REV CODE 250: Performed by: INTERNAL MEDICINE

## 2022-05-06 PROCEDURE — 93306 TTE W/DOPPLER COMPLETE: CPT

## 2022-05-06 PROCEDURE — 80053 COMPREHEN METABOLIC PANEL: CPT | Performed by: STUDENT IN AN ORGANIZED HEALTH CARE EDUCATION/TRAINING PROGRAM

## 2022-05-06 PROCEDURE — 93306 TTE W/DOPPLER COMPLETE: CPT | Mod: 26,,, | Performed by: INTERNAL MEDICINE

## 2022-05-06 PROCEDURE — G0378 HOSPITAL OBSERVATION PER HR: HCPCS

## 2022-05-06 PROCEDURE — 96375 TX/PRO/DX INJ NEW DRUG ADDON: CPT | Mod: 59

## 2022-05-06 PROCEDURE — 87086 URINE CULTURE/COLONY COUNT: CPT | Performed by: EMERGENCY MEDICINE

## 2022-05-06 PROCEDURE — 85610 PROTHROMBIN TIME: CPT | Performed by: STUDENT IN AN ORGANIZED HEALTH CARE EDUCATION/TRAINING PROGRAM

## 2022-05-06 PROCEDURE — 83735 ASSAY OF MAGNESIUM: CPT | Performed by: EMERGENCY MEDICINE

## 2022-05-06 PROCEDURE — 85730 THROMBOPLASTIN TIME PARTIAL: CPT | Performed by: EMERGENCY MEDICINE

## 2022-05-06 PROCEDURE — 84484 ASSAY OF TROPONIN QUANT: CPT | Performed by: STUDENT IN AN ORGANIZED HEALTH CARE EDUCATION/TRAINING PROGRAM

## 2022-05-06 PROCEDURE — 85025 COMPLETE CBC W/AUTO DIFF WBC: CPT | Performed by: STUDENT IN AN ORGANIZED HEALTH CARE EDUCATION/TRAINING PROGRAM

## 2022-05-06 PROCEDURE — 25000003 PHARM REV CODE 250: Performed by: NURSE PRACTITIONER

## 2022-05-06 PROCEDURE — 63600175 PHARM REV CODE 636 W HCPCS: Performed by: STUDENT IN AN ORGANIZED HEALTH CARE EDUCATION/TRAINING PROGRAM

## 2022-05-06 PROCEDURE — 93010 EKG 12-LEAD: ICD-10-PCS | Mod: ,,, | Performed by: GENERAL PRACTICE

## 2022-05-06 PROCEDURE — 36415 COLL VENOUS BLD VENIPUNCTURE: CPT | Performed by: EMERGENCY MEDICINE

## 2022-05-06 PROCEDURE — 97161 PT EVAL LOW COMPLEX 20 MIN: CPT

## 2022-05-06 PROCEDURE — 83036 HEMOGLOBIN GLYCOSYLATED A1C: CPT | Performed by: NURSE PRACTITIONER

## 2022-05-06 PROCEDURE — 87040 BLOOD CULTURE FOR BACTERIA: CPT | Mod: 59 | Performed by: EMERGENCY MEDICINE

## 2022-05-06 PROCEDURE — 81001 URINALYSIS AUTO W/SCOPE: CPT | Performed by: EMERGENCY MEDICINE

## 2022-05-06 PROCEDURE — U0002 COVID-19 LAB TEST NON-CDC: HCPCS | Performed by: EMERGENCY MEDICINE

## 2022-05-06 PROCEDURE — 97535 SELF CARE MNGMENT TRAINING: CPT

## 2022-05-06 PROCEDURE — 97165 OT EVAL LOW COMPLEX 30 MIN: CPT

## 2022-05-06 PROCEDURE — 96365 THER/PROPH/DIAG IV INF INIT: CPT | Mod: 59

## 2022-05-06 RX ORDER — HYDROCODONE POLISTIREX AND CHLORPHENIRAMINE POLISTIREX 10; 8 MG/5ML; MG/5ML
2.5 SUSPENSION, EXTENDED RELEASE ORAL EVERY 12 HOURS PRN
Qty: 70 ML | Refills: 0 | Status: SHIPPED | OUTPATIENT
Start: 2022-05-06 | End: 2022-05-12

## 2022-05-06 RX ORDER — ENOXAPARIN SODIUM 100 MG/ML
40 INJECTION SUBCUTANEOUS EVERY 24 HOURS
Status: DISCONTINUED | OUTPATIENT
Start: 2022-05-06 | End: 2022-05-06

## 2022-05-06 RX ORDER — PROCHLORPERAZINE EDISYLATE 5 MG/ML
5 INJECTION INTRAMUSCULAR; INTRAVENOUS EVERY 6 HOURS PRN
Status: DISCONTINUED | OUTPATIENT
Start: 2022-05-06 | End: 2022-05-06 | Stop reason: HOSPADM

## 2022-05-06 RX ORDER — LEVOFLOXACIN 5 MG/ML
750 INJECTION, SOLUTION INTRAVENOUS
Status: COMPLETED | OUTPATIENT
Start: 2022-05-06 | End: 2022-05-06

## 2022-05-06 RX ORDER — SODIUM CHLORIDE 0.9 % (FLUSH) 0.9 %
10 SYRINGE (ML) INJECTION
Status: DISCONTINUED | OUTPATIENT
Start: 2022-05-06 | End: 2022-05-06 | Stop reason: HOSPADM

## 2022-05-06 RX ORDER — LABETALOL HYDROCHLORIDE 5 MG/ML
10 INJECTION, SOLUTION INTRAVENOUS
Status: DISCONTINUED | OUTPATIENT
Start: 2022-05-06 | End: 2022-05-06 | Stop reason: HOSPADM

## 2022-05-06 RX ORDER — AMLODIPINE BESYLATE 5 MG/1
10 TABLET ORAL DAILY
Status: DISCONTINUED | OUTPATIENT
Start: 2022-05-06 | End: 2022-05-06

## 2022-05-06 RX ORDER — BISACODYL 10 MG
10 SUPPOSITORY, RECTAL RECTAL DAILY PRN
Status: DISCONTINUED | OUTPATIENT
Start: 2022-05-06 | End: 2022-05-06 | Stop reason: HOSPADM

## 2022-05-06 RX ORDER — LOSARTAN POTASSIUM 50 MG/1
50 TABLET ORAL DAILY
Status: DISCONTINUED | OUTPATIENT
Start: 2022-05-06 | End: 2022-05-06 | Stop reason: HOSPADM

## 2022-05-06 RX ORDER — MECLIZINE HYDROCHLORIDE 25 MG/1
25 TABLET ORAL 2 TIMES DAILY
Qty: 10 TABLET | Refills: 0 | Status: SHIPPED | OUTPATIENT
Start: 2022-05-06 | End: 2022-05-11

## 2022-05-06 RX ORDER — GLUCAGON 1 MG
1 KIT INJECTION
Status: DISCONTINUED | OUTPATIENT
Start: 2022-05-06 | End: 2022-05-06 | Stop reason: HOSPADM

## 2022-05-06 RX ORDER — IBUPROFEN 200 MG
24 TABLET ORAL
Status: DISCONTINUED | OUTPATIENT
Start: 2022-05-06 | End: 2022-05-06 | Stop reason: HOSPADM

## 2022-05-06 RX ORDER — ASPIRIN 81 MG/1
81 TABLET ORAL DAILY
Status: DISCONTINUED | OUTPATIENT
Start: 2022-05-06 | End: 2022-05-06 | Stop reason: HOSPADM

## 2022-05-06 RX ORDER — ENOXAPARIN SODIUM 100 MG/ML
30 INJECTION SUBCUTANEOUS EVERY 24 HOURS
Status: DISCONTINUED | OUTPATIENT
Start: 2022-05-06 | End: 2022-05-06 | Stop reason: HOSPADM

## 2022-05-06 RX ORDER — INSULIN ASPART 100 [IU]/ML
0-5 INJECTION, SOLUTION INTRAVENOUS; SUBCUTANEOUS
Status: DISCONTINUED | OUTPATIENT
Start: 2022-05-06 | End: 2022-05-06 | Stop reason: HOSPADM

## 2022-05-06 RX ORDER — ATORVASTATIN CALCIUM 40 MG/1
40 TABLET, FILM COATED ORAL DAILY
Status: DISCONTINUED | OUTPATIENT
Start: 2022-05-06 | End: 2022-05-06 | Stop reason: HOSPADM

## 2022-05-06 RX ORDER — MECLIZINE HCL 12.5 MG 12.5 MG/1
25 TABLET ORAL
Status: COMPLETED | OUTPATIENT
Start: 2022-05-06 | End: 2022-05-06

## 2022-05-06 RX ORDER — METOPROLOL TARTRATE 25 MG/1
25 TABLET, FILM COATED ORAL ONCE
Status: COMPLETED | OUTPATIENT
Start: 2022-05-06 | End: 2022-05-06

## 2022-05-06 RX ORDER — IBUPROFEN 200 MG
16 TABLET ORAL
Status: DISCONTINUED | OUTPATIENT
Start: 2022-05-06 | End: 2022-05-06 | Stop reason: HOSPADM

## 2022-05-06 RX ORDER — LEVOFLOXACIN 5 MG/ML
750 INJECTION, SOLUTION INTRAVENOUS
Status: DISCONTINUED | OUTPATIENT
Start: 2022-05-08 | End: 2022-05-06

## 2022-05-06 RX ORDER — ASPIRIN 81 MG/1
81 TABLET ORAL DAILY
Refills: 0
Start: 2022-05-07 | End: 2023-05-07

## 2022-05-06 RX ORDER — ONDANSETRON 2 MG/ML
4 INJECTION INTRAMUSCULAR; INTRAVENOUS
Status: COMPLETED | OUTPATIENT
Start: 2022-05-06 | End: 2022-05-06

## 2022-05-06 RX ADMIN — LOSARTAN POTASSIUM 50 MG: 50 TABLET, FILM COATED ORAL at 02:05

## 2022-05-06 RX ADMIN — METOPROLOL TARTRATE 25 MG: 25 TABLET, FILM COATED ORAL at 02:05

## 2022-05-06 RX ADMIN — MECLIZINE HYDROCHLORIDE 25 MG: 12.5 TABLET ORAL at 01:05

## 2022-05-06 RX ADMIN — ONDANSETRON 4 MG: 2 INJECTION INTRAMUSCULAR; INTRAVENOUS at 01:05

## 2022-05-06 RX ADMIN — ASPIRIN 81 MG: 81 TABLET, DELAYED RELEASE ORAL at 09:05

## 2022-05-06 RX ADMIN — LEVOFLOXACIN 750 MG: 750 INJECTION, SOLUTION INTRAVENOUS at 04:05

## 2022-05-06 RX ADMIN — IOHEXOL 100 ML: 350 INJECTION, SOLUTION INTRAVENOUS at 12:05

## 2022-05-06 NOTE — PROGRESS NOTES
Pharmacist Renal Dose Adjustment Note    Jeanette Evans is a 87 y.o. female being treated with Enoxaparin 40 mg SQ once daily    Patient Data:    Vital Signs (Most Recent):  Temp: 97.7 °F (36.5 °C) (05/06/22 0002)  Pulse: 88 (05/06/22 0430)  Resp: 16 (05/06/22 0430)  BP: (!) 166/76 (05/06/22 0030)  SpO2: 98 % (05/06/22 0430)   Vital Signs (72h Range):  Temp:  [97.7 °F (36.5 °C)]   Pulse:  []   Resp:  [14-20]   BP: (166-172)/(76-89)   SpO2:  [96 %-99 %]      Recent Labs   Lab 05/06/22  0043   CREATININE 1.3       Estimated Creatinine Clearance: 27 mL/min (based on SCr of 1.3 mg/dL).  Body mass index is 31.98 kg/m².      Enoxaparin 40 mg SQ once daily will be changed to Enoxaparin 30 mg SQ once daily per pharmacy protocol.     Pharmacist's Name: Mya Kent  Pharmacist's Extension: [phone]

## 2022-05-06 NOTE — PLAN OF CARE
Medicare Outpatient Observation Notice was signed, explained and given to patient/caregiver on 05/06/2022 at 9:26am     addressed any questions or concerns.    Medicare Outpatient Observation Notice will be scanned into patient's medical record

## 2022-05-06 NOTE — PLAN OF CARE
05/06/22 0600   Patient Assessment/Suction   Level of Consciousness (AVPU) alert   Respiratory Effort Unlabored   Expansion/Accessory Muscles/Retractions expansion symmetric   All Lung Fields Breath Sounds coarse   Rhythm/Pattern, Respiratory depth regular;pattern regular   Cough Frequency infrequent   Cough Type good;nonproductive   PRE-TX-O2   O2 Device (Oxygen Therapy) room air

## 2022-05-06 NOTE — PROGRESS NOTES
Pharmacist Renal Adjustment Note    Jeanette Evans is a 87 y.o. female being treated with Levofloxacin 750 mg IV every 24 hours.    Patient Data:    Vital Signs (Most Recent):  Temp: 97.7 °F (36.5 °C) (05/06/22 0002)  Pulse: 88 (05/06/22 0430)  Resp: 16 (05/06/22 0430)  BP: (!) 166/76 (05/06/22 0030)  SpO2: 98 % (05/06/22 0430)   Vital Signs (72h Range):  Temp:  [97.7 °F (36.5 °C)]   Pulse:  []   Resp:  [14-20]   BP: (166-172)/(76-89)   SpO2:  [96 %-99 %]      Recent Labs   Lab 05/06/22 0043   CREATININE 1.3     Serum creatinine: 1.3 mg/dL 05/06/22 0043  Estimated creatinine clearance: 27 mL/min    Levofloxacin 750 mg IV every 24 hours will be changed to Levofloxacin 750 mg IV every 48 hours per pharmacy protocol.     Pharmacist's Name: May Kent  Pharmacist's Extension: [phone]

## 2022-05-06 NOTE — PT/OT/SLP EVAL
Speech Language Pathology Evaluation  Bedside Swallow    Patient Name:  Jeanette Evans   MRN:  [id number]  Admitting Diagnosis: Dizziness    Recommendations:                 General Recommendations:    · No follow up indicated     Diet recommendations:  1) regular solids  2) thin liquid   Aspiration Precautions: Standard aspiration precautions   General Precautions: Standard,    Communication strategies:       History:       Per EMR:   87-year-old female with a history of diabetes, GERD, hypertension brought in by EMS with dizziness.  History is provided by the patient's daughter as the patient is distressed and she is Occitan-speaking only.  The patient has had a nonproductive cough for the past 2-3 days.  She saw her primary care physician who ordered a COVID test.  This was negative.  The patient was prescribed Tessalon Perles.  She has been taking this medication.  Tonight she has an albuterol inhaler and immediately had sudden onset of dizziness that she describes as the world spinning around her.  She has had multiple episodes of nonbloody, nonbilious emesis.  Her daughter checked her blood pressure and noted that it was extremely elevated with systolic blood pressure in the 200s.  She gave her a p.r.n. clonidine with the patient's symptoms persisted so she called 911 for transport to the hospital.  The patient typically gets dizzy when her blood pressure is elevated but never this severe.  The patient denies any other focal neurological deficits such as acute visual changes (she has macular degeneration), numbness, weakness or speech deficits.    Past Medical History:   Diagnosis Date    Diabetes mellitus type II     GERD (gastroesophageal reflux disease)     Hypertension        Past Surgical History:   Procedure Laterality Date    CATARACT EXTRACTION      CHOLECYSTECTOMY         Imaging Results          X-Ray Chest AP Portable (Final result)  Result time 05/06/22 06:48:44    Final result by  Yuri Llamas MD (05/06/22 06:48:44)                 Narrative:    Reason: hypertension    FINDINGS:    PA and lateral chest with comparison chest x-ray April 2, 2021 show normal cardiomediastinal silhouette.  There is prominence of vascular structures of the lungs. No confluent airspace opacity. Pulmonary vasculature is normal. No acute osseous abnormality.    IMPRESSION:    No acute cardiopulmonary abnormality.    Electronically signed by:  Yuri Llamas DO  5/6/2022 6:48 AM CDT Workstation: [id number]                             CTA Head and Neck (xpd) (Final result)  Result time 05/06/22 01:33:09    Final result by Tegan Robin MD (05/06/22 01:33:09)                 Narrative:    EXAM:  CT Angiography Head and Neck With Intravenous Contrast    CLINICAL HISTORY:  The patient is 87 years old and is Female; Dizziness, non-specific    TECHNIQUE:  Axial computed tomographic angiography images of the head and neck with intravenous contrast.  Measurements of carotid stenosis were determined utilizing NASCET criteria.  Sagittal and coronal reformatted images were created and reviewed.  This CT exam was performed using one or more of the following dose reduction techniques:  automated exposure control, adjustment of the mA and/or kV according to patient size, and/or use of iterative reconstruction technique.  MIP reconstructed images were created and reviewed.    COMPARISON:  No relevant prior studies available.    FINDINGS:  HEAD:  RIGHT ANTERIOR CEREBRAL ARTERY:  No occlusion or significant stenosis.  No aneurysm.  RIGHT MIDDLE CEREBRAL ARTERY:  No occlusion or significant stenosis.  No aneurysm.  RIGHT POSTERIOR CEREBRAL ARTERY:  There is persistent fetal origin of the right posterior cerebral artery. No significant stenosis or occlusion.  RIGHT INTRACRANIAL INTERNAL CAROTID ARTERY:  Unremarkable.  No significant stenosis.  No dissection or occlusion.  RIGHT INTRACRANIAL VERTEBRAL ARTERY:  Unremarkable.  No  significant stenosis.  No dissection or occlusion.    LEFT ANTERIOR CEREBRAL ARTERY:  No occlusion or significant stenosis.  No aneurysm.  LEFT MIDDLE CEREBRAL ARTERY:  No occlusion or significant stenosis.  No aneurysm.  LEFT POSTERIOR CEREBRAL ARTERY:  There is persistent fetal origin of the left posterior cerebral artery. No significant stenosis or occlusion.  LEFT INTRACRANIAL INTERNAL CAROTID ARTERY:  Unremarkable.  No significant stenosis.  No dissection or occlusion.  LEFT INTRACRANIAL VERTEBRAL ARTERY:  Unremarkable.  No significant stenosis.  No dissection or occlusion.    BASILAR ARTERY:  No occlusion or significant stenosis.  No aneurysm.    NECK:  RIGHT COMMON CAROTID ARTERY:  No significant stenosis.  No dissection or occlusion.  RIGHT EXTRACRANIAL INTERNAL CAROTID ARTERY:  Unremarkable.  No significant stenosis.  No dissection or occlusion.  RIGHT EXTERNAL CAROTID ARTERY:  Unremarkable.  No occlusion.  RIGHT EXTRACRANIAL VERTEBRAL ARTERY:  Unremarkable.  No significant stenosis.  No dissection or occlusion.    LEFT COMMON CAROTID ARTERY:  No significant stenosis.  No dissection or occlusion.  LEFT EXTRACRANIAL INTERNAL CAROTID ARTERY:  Unremarkable.  No significant stenosis.  No dissection or occlusion.  LEFT EXTERNAL CAROTID ARTERY:  Unremarkable.  No occlusion.  LEFT EXTRACRANIAL VERTEBRAL ARTERY:  There is atherosclerotic calcification at the origin of the left vertebral artery, with mild associated narrowing of the vertebral artery at this level. No occlusion.    HEAD and NECK:  BONES/JOINTS:  No acute fracture.  No dislocation.  SOFT TISSUES:  Unremarkable.  No mass.    CAROTID STENOSIS REFERENCE USING NASCET CRITERIA:  % ICA stenosis = (1 - narrowest ICA diameter/diameter of distal cervical ICA) x 100.  Mild - <50% stenosis.  Moderate - 50-69% stenosis.  Severe - 70-94% stenosis.  Near occlusion - 95-99% stenosis.  Occluded - 100% stenosis.    IMPRESSION:  No significant stenosis or occlusion  within the arterial vasculature of the head and neck.    Electronically signed by:  Tegan Robin MD  5/6/2022 1:33 AM CDT Workstation: [id number]                             CT Head Without Contrast (Final result)  Result time 05/06/22 01:33:08    Final result by Thomas Biggs MD (05/06/22 01:33:08)                 Narrative:    EXAM:CT HEAD WITHOUT CONTRAST 5/6/2022 12:41 AM CDT    CLINICAL INDICATION: Mental status change, unknown cause    COMPARISON: July 30, 2019    TECHNIQUE: Axial CT images of the head are obtained from the skull base to the vertex without IV contrast. Axial, sagittal, and coronal images are interpreted.    This exam was performed according to our departmental dose-optimization protocol, which includes automated exposure control, adjustment of the mA and/or kV according to patient size and/or use of iterative reconstruction technique.    FINDINGS:    CORTEX: Age-appropriate brain atrophy  There is no evidence of cerebral edema, mass, mass effect, hemorrhage, or recent cortical infarct. The gray-white distinction is maintained.    WHITE MATTER: Periventricular deep and subcortical white matter disease is similar to the previous study but we have orbital.    BASAL GANGLIA: Basal ganglia are intact.    VENTRICLES: Ventricles are normal in size and configuration.    POSTERIOR FOSSA: The brain stem and cerebellum are within normal limits.  No mass, mass effect, hemorrhage or recent cortical infarct is present.  The foramen magnum is normal.    SKULL: No acute skull abnormality is seen.  No lytic or sclerotic lesions.    SCALP:Normal    ORBITS, VISUALIZED PARANASAL SINUSES AND MASTOIDS: Paranasal sinuses are mostly clear but for some mucoperiosteal thickening within the left maxillary sinus The mastoid air cells are clear. No orbital pathology.    IMPRESSION:  1.  Normal non contrast CT of the head for age.    Standardized Report:  [id number].    Electronically signed by:  Thomas Biggs MD   5/6/2022 1:33 AM CDT Workstation: [id number]                                Subjective     Pt alert, hungry   Pt promotes near resolution of symptoms   Patient goals: none stated      Pain/Comfort:  Pain Rating 1: 0/10    Respiratory Status: Room air    Objective:     Oral Musculature Evaluation  Oral Musculature: WNL  Dentition: present and adequate  Secretion Management: adequate  Mucosal Quality: good  Mandibular Strength and Mobility: WNL (able to palpate masseter bilaterally)  Oral Labial Strength and Mobility: WNL  Lingual Strength and Mobility: WNL  Velar Elevation: WNL (uvual midline, symmetrical elevation)  Volitional Cough: Present with good effort  Volitional Swallow: Hyolaryngeal lift present to digital palpation  Voice Prior to PO Intake: clear in nature, average intensity  Oral Musculature Comments: face is symmetrical at rest and during smile; tongue protrudes midline w/ equal lateralization. bilateral and equal sensation to forehead, cheek & jaw    Bedside Swallow Eval:   Consistencies Assessed:  · Thin liquid: ice chip x1, teaspoon x1, 3oz via consecutive cup sips, self regulated straw   · Solid: 1/4 cracker x4    Oral Phase:   · Intact labial seal to cup edge and for spoon stripping   · Adequate oral containment without anterior spillage across consistencies   · Timely mastication with visible rotary chew   · No evidence of appreciable oral residue .    Pharyngeal Phase:   · No overt signs or symptoms of aspiration or airway threat  · Clear vocal quality maintained   · Completed uninterrupted drinking of 3 oz water with no overt s/s of aspiration (i.e., immediate coughing or choking), passing 3oz water challenge. Risk of aspiration not indicated although cannot be ruled out at bedside.   · Multiple swallows not observed across consistencies trialled.   · Globus sensation not promoted     Compensatory Strategies  · None     Cognitive Communication ( used): Alert and oriented x4. Able to  follow commands within unstructured context. Fluent and appropriate at the conversational level. Recalls recent medical events and procedures.     Assessment:     Jeanette Evans is a 87 y.o. female with an SLP diagnosis of no s/s of oropharyngeal dysphagia, aphasia, motor speech impairment or cognitive communication impairment.      Plan:     · Plan of Care reviewed with:  patient, family   · SLP Follow-Up:  No       Discharge recommendations:  home     Time Tracking:     SLP Treatment Date:   05/06/22  Speech Start Time:  1130  Speech Stop Time:  1143     Speech Total Time (min):  13 min    Billable Minutes: Eval Swallow and Oral Function 13    05/06/2022

## 2022-05-06 NOTE — PLAN OF CARE
[facility]  Initial Discharge Assessment       Primary Care Provider: Mervin Killian MD    Admission Diagnosis: Pneumonia of right upper lobe due to infectious organism [J18.9]    Admission Date: 5/6/2022  Expected Discharge Date: 5/6/2022     SW met with pt at bedside, daughters and spouse present and Michelle simmons answered discharge assessment questions, verified PCP, pharmacy and information on facesheet.  Pt lives with daughter and spouse and independent with ADLs and ambulation.  No HH, DME, dialysis and doesn't take coumadin.  Daughter will provide transportation home.  No needs identified at this time.    Discharge Barriers Identified: None    Payor: [employer] / Plan: [employer] / Product Type: Medicare Advantage /     Extended Emergency Contact Information  Primary Emergency Contact: Ashby,Myra  Address: [address] [address]  Home Phone: [phone]  Mobile Phone: [phone]  Relation: Daughter  Preferred language: English   needed? No  Secondary Emergency Contact: Jenny Bell  Mobile Phone: [phone]  Relation: Daughter  Preferred language: English   needed? No    Discharge Plan A: Home  Discharge Plan B: Home      [id number] [address] AT [facility] & [address] [address]  Phone: [phone] Fax: [phone]      Initial Assessment (most recent)     Adult Discharge Assessment - 05/06/22 1342        Discharge Assessment    Assessment Type Discharge Planning Assessment     Confirmed/corrected address, phone number and insurance Yes     Confirmed Demographics Correct on Facesheet     Source of Information family     Communicated ORIN with patient/caregiver No     Lives With child(paris), adult;spouse     Do you expect to return to your current living situation? Yes     Prior to hospitilization cognitive  status: Alert/Oriented     Current cognitive status: Unable to Assess     Walking or Climbing Stairs Difficulty none     Dressing/Bathing Difficulty none     Home Accessibility wheelchair accessible     Home Layout Able to live on 1st floor     Equipment Currently Used at Home none     Readmission within 30 days? No     Patient currently being followed by outpatient case management? No     Do you currently have service(s) that help you manage your care at home? No     Do you take prescription medications? No     Do you have prescription coverage? Yes     Coverage PHN     Do you have any problems affording any of your prescribed medications? No     Is the patient taking medications as prescribed? yes     Who is going to help you get home at discharge? daughter, Michelle     How do you get to doctors appointments? family or friend will provide     Are you on dialysis? No     Do you take coumadin? No     Discharge Plan A Home     Discharge Plan B Home     DME Needed Upon Discharge  none     Discharge Plan discussed with: Patient     Discharge Barriers Identified None

## 2022-05-06 NOTE — NURSING
Discharge instruction reviewed with daughter at bedside, written instruction provided, questions answered, belongings returned. Pt to home accompanied by family for self-care.

## 2022-05-06 NOTE — ASSESSMENT & PLAN NOTE
With nausea and vomiting  Concern for posterior circulation stroke  Stroke protocol  Neuro checks q 4 hours  Pulse oximetry q.4 hours  Cardiac monitor for arrhythmia  Aspiration precautions  Fall precautions  NPO til passing nursing swallow assessment  Permissive hypertension  Labetalol  IV p.r.n. for systolic blood pressure above 220 or diastolic blood pressure above 100  Anti-lipidemics with atorvastatin 40 mg daily  Anti-thrombotic with aspirin 81 mg daily  Labs:  CMP, magnesium, CBC daily  Consult and treat:  PT, OT, ST, dietitian, case management  Consult Neurology  Imaging: MRI brain, echocardiogram with bubble study

## 2022-05-06 NOTE — ED PROVIDER NOTES
Encounter Date: 5/5/2022       History     Chief Complaint   Patient presents with    Hypertension    Fatigue     X 2-3 hrs    Nausea     87-year-old female with a history of diabetes, GERD, hypertension brought in by EMS with dizziness.  History is provided by the patient's daughter as the patient is distressed and she is Divehi-speaking only.  The patient has had a nonproductive cough for the past 2-3 days.  She saw her primary care physician who ordered a COVID test.  This was negative.  The patient was prescribed Tessalon Perles.  She has been taking this medication.  Tonight she has an albuterol inhaler and immediately had sudden onset of dizziness that she describes as the world spinning around her.  She has had multiple episodes of nonbloody, nonbilious emesis.  Her daughter checked her blood pressure and noted that it was extremely elevated with systolic blood pressure in the 200s.  She gave her a p.r.n. clonidine with the patient's symptoms persisted so she called 911 for transport to the hospital.  The patient typically gets dizzy when her blood pressure is elevated but never this severe.  The patient denies any other focal neurological deficits such as acute visual changes (she has macular degeneration), numbness, weakness or speech deficits.        Review of patient's allergies indicates:   Allergen Reactions    Pcn [penicillins] Anaphylaxis     Other reaction(s): Anaphylaxis     Past Medical History:   Diagnosis Date    Diabetes mellitus type II     GERD (gastroesophageal reflux disease)     Hypertension      Past Surgical History:   Procedure Laterality Date    CATARACT EXTRACTION      CHOLECYSTECTOMY       Family History   Problem Relation Age of Onset    Cancer Mother     Heart disease Father     Amblyopia Neg Hx     Blindness Neg Hx     Cataracts Neg Hx     Glaucoma Neg Hx     Macular degeneration Neg Hx     Retinal detachment Neg Hx     Strabismus Neg Hx      Social History      Tobacco Use    Smoking status: Never Smoker    Smokeless tobacco: Never Used   Substance Use Topics    Alcohol use: No     Review of Systems   Constitutional: Negative for fever.   HENT: Negative for sore throat.    Respiratory: Positive for cough. Negative for shortness of breath.    Cardiovascular: Negative for chest pain.   Gastrointestinal: Positive for nausea and vomiting. Negative for abdominal pain.   Genitourinary: Negative for dysuria.   Musculoskeletal: Negative for back pain.   Skin: Negative for rash.   Neurological: Positive for dizziness. Negative for seizures, speech difficulty, weakness, light-headedness and numbness.   Hematological: Does not bruise/bleed easily.   Psychiatric/Behavioral: Negative for confusion.   All other systems reviewed and are negative.      Physical Exam     Initial Vitals [05/06/22 0002]   BP Pulse Resp Temp SpO2   (!) 172/89 94 18 97.7 °F (36.5 °C) 98 %      MAP       --         Physical Exam    Nursing note and vitals reviewed.  Constitutional: She appears well-developed and well-nourished. She appears distressed.   HENT:   Head: Normocephalic and atraumatic.   Eyes: Pupils are equal, round, and reactive to light.   Rotary nystagmus   Neck: Neck supple.   Normal range of motion.  Cardiovascular: Normal rate, regular rhythm, normal heart sounds and intact distal pulses.   No murmur heard.  Pulmonary/Chest: Breath sounds normal. She has no wheezes. She has no rhonchi. She has no rales.   Abdominal: Abdomen is soft. There is no abdominal tenderness. There is no rebound and no guarding.   Musculoskeletal:         General: No edema. Normal range of motion.      Cervical back: Normal range of motion and neck supple.     Neurological: She is alert and oriented to person, place, and time. She has normal strength. No cranial nerve deficit or sensory deficit. GCS score is 15. GCS eye subscore is 4. GCS verbal subscore is 5. GCS motor subscore is 6.   5/5 strength in all 4  extremities worse with sensation intact diffusely   Skin: Skin is warm and dry. Capillary refill takes less than 2 seconds.   Psychiatric: She has a normal mood and affect.         ED Course   Procedures  Labs Reviewed   CBC W/ AUTO DIFFERENTIAL - Abnormal; Notable for the following components:       Result Value    Hemoglobin 11.2 (*)     Hematocrit 34.6 (*)     MCV 78 (*)     MCH 25.2 (*)     RDW 15.5 (*)     Immature Granulocytes 0.7 (*)     Gran # (ANC) 8.5 (*)     Immature Grans (Abs) 0.09 (*)     All other components within normal limits   COMPREHENSIVE METABOLIC PANEL - Abnormal; Notable for the following components:    Glucose 146 (*)     eGFR if  42.6 (*)     eGFR if non  37.0 (*)     All other components within normal limits   B-TYPE NATRIURETIC PEPTIDE - Abnormal; Notable for the following components:     (*)     All other components within normal limits   URINALYSIS, REFLEX TO URINE CULTURE - Abnormal; Notable for the following components:    Specific Gravity, UA >1.030 (*)     Ketones, UA Trace (*)     Leukocytes, UA 3+ (*)     All other components within normal limits    Narrative:     Specimen Source->Urine   URINALYSIS MICROSCOPIC - Abnormal; Notable for the following components:    WBC, UA >100 (*)     Hyaline Casts, UA 9 (*)     All other components within normal limits    Narrative:     Specimen Source->Urine   POCT GLUCOSE - Abnormal; Notable for the following components:    POC Glucose 137 (*)     All other components within normal limits   TROPONIN I   PROTIME-INR   MAGNESIUM   LIPASE   SARS-COV-2 RNA AMPLIFICATION, QUAL   APTT   LIPID PANEL    Narrative:     Fasting   TSH    Narrative:     Fasting   ISTAT CREATININE     EKG Readings: (Independently Interpreted)   Initial Reading: No STEMI.   EKG is normal sinus rhythm at a rate of 81 beats per minute with no ST elevation or depression, normal intervals, flat T-waves in lead 3, no STEMI     ECG Results           EKG 12-lead (In process)  Result time 05/07/22 08:43:58    In process by Interface, Lab In [facility] (05/07/22 08:43:58)                 Narrative:    Test Reason : R07.9,    Vent. Rate : 081 BPM     Atrial Rate : 081 BPM     P-R Int : 132 ms          QRS Dur : 076 ms      QT Int : 366 ms       P-R-T Axes : 042 048 031 degrees     QTc Int : 425 ms    Normal sinus rhythm  Normal ECG  When compared with ECG of 06-APR-2017 14:45,  No significant change was found    Referred By: [email or web address]   SELF           Confirmed By:                             Imaging Results          X-Ray Chest AP Portable (Final result)  Result time 05/06/22 06:48:44    Final result by Yuri Llamas MD (05/06/22 06:48:44)                 Narrative:    Reason: hypertension    FINDINGS:    PA and lateral chest with comparison chest x-ray April 2, 2021 show normal cardiomediastinal silhouette.  There is prominence of vascular structures of the lungs. No confluent airspace opacity. Pulmonary vasculature is normal. No acute osseous abnormality.    IMPRESSION:    No acute cardiopulmonary abnormality.    Electronically signed by:  Yuri Llamas DO  5/6/2022 6:48 AM CDT Workstation: [id number]                             CTA Head and Neck (xpd) (Final result)  Result time 05/06/22 01:33:09    Final result by Tegan Robin MD (05/06/22 01:33:09)                 Narrative:    EXAM:  CT Angiography Head and Neck With Intravenous Contrast    CLINICAL HISTORY:  The patient is 87 years old and is Female; Dizziness, non-specific    TECHNIQUE:  Axial computed tomographic angiography images of the head and neck with intravenous contrast.  Measurements of carotid stenosis were determined utilizing NASCET criteria.  Sagittal and coronal reformatted images were created and reviewed.  This CT exam was performed using one or more of the following dose reduction techniques:  automated exposure control, adjustment of the mA and/or kV according to patient size,  and/or use of iterative reconstruction technique.  MIP reconstructed images were created and reviewed.    COMPARISON:  No relevant prior studies available.    FINDINGS:  HEAD:  RIGHT ANTERIOR CEREBRAL ARTERY:  No occlusion or significant stenosis.  No aneurysm.  RIGHT MIDDLE CEREBRAL ARTERY:  No occlusion or significant stenosis.  No aneurysm.  RIGHT POSTERIOR CEREBRAL ARTERY:  There is persistent fetal origin of the right posterior cerebral artery. No significant stenosis or occlusion.  RIGHT INTRACRANIAL INTERNAL CAROTID ARTERY:  Unremarkable.  No significant stenosis.  No dissection or occlusion.  RIGHT INTRACRANIAL VERTEBRAL ARTERY:  Unremarkable.  No significant stenosis.  No dissection or occlusion.    LEFT ANTERIOR CEREBRAL ARTERY:  No occlusion or significant stenosis.  No aneurysm.  LEFT MIDDLE CEREBRAL ARTERY:  No occlusion or significant stenosis.  No aneurysm.  LEFT POSTERIOR CEREBRAL ARTERY:  There is persistent fetal origin of the left posterior cerebral artery. No significant stenosis or occlusion.  LEFT INTRACRANIAL INTERNAL CAROTID ARTERY:  Unremarkable.  No significant stenosis.  No dissection or occlusion.  LEFT INTRACRANIAL VERTEBRAL ARTERY:  Unremarkable.  No significant stenosis.  No dissection or occlusion.    BASILAR ARTERY:  No occlusion or significant stenosis.  No aneurysm.    NECK:  RIGHT COMMON CAROTID ARTERY:  No significant stenosis.  No dissection or occlusion.  RIGHT EXTRACRANIAL INTERNAL CAROTID ARTERY:  Unremarkable.  No significant stenosis.  No dissection or occlusion.  RIGHT EXTERNAL CAROTID ARTERY:  Unremarkable.  No occlusion.  RIGHT EXTRACRANIAL VERTEBRAL ARTERY:  Unremarkable.  No significant stenosis.  No dissection or occlusion.    LEFT COMMON CAROTID ARTERY:  No significant stenosis.  No dissection or occlusion.  LEFT EXTRACRANIAL INTERNAL CAROTID ARTERY:  Unremarkable.  No significant stenosis.  No dissection or occlusion.  LEFT EXTERNAL CAROTID ARTERY:  Unremarkable.   No occlusion.  LEFT EXTRACRANIAL VERTEBRAL ARTERY:  There is atherosclerotic calcification at the origin of the left vertebral artery, with mild associated narrowing of the vertebral artery at this level. No occlusion.    HEAD and NECK:  BONES/JOINTS:  No acute fracture.  No dislocation.  SOFT TISSUES:  Unremarkable.  No mass.    CAROTID STENOSIS REFERENCE USING NASCET CRITERIA:  % ICA stenosis = (1 - narrowest ICA diameter/diameter of distal cervical ICA) x 100.  Mild - <50% stenosis.  Moderate - 50-69% stenosis.  Severe - 70-94% stenosis.  Near occlusion - 95-99% stenosis.  Occluded - 100% stenosis.    IMPRESSION:  No significant stenosis or occlusion within the arterial vasculature of the head and neck.    Electronically signed by:  Tegan Robin MD  5/6/2022 1:33 AM CDT Workstation: [id number]                             CT Head Without Contrast (Final result)  Result time 05/06/22 01:33:08    Final result by Thmoas Biggs MD (05/06/22 01:33:08)                 Narrative:    EXAM:CT HEAD WITHOUT CONTRAST 5/6/2022 12:41 AM CDT    CLINICAL INDICATION: Mental status change, unknown cause    COMPARISON: July 30, 2019    TECHNIQUE: Axial CT images of the head are obtained from the skull base to the vertex without IV contrast. Axial, sagittal, and coronal images are interpreted.    This exam was performed according to our departmental dose-optimization protocol, which includes automated exposure control, adjustment of the mA and/or kV according to patient size and/or use of iterative reconstruction technique.    FINDINGS:    CORTEX: Age-appropriate brain atrophy  There is no evidence of cerebral edema, mass, mass effect, hemorrhage, or recent cortical infarct. The gray-white distinction is maintained.    WHITE MATTER: Periventricular deep and subcortical white matter disease is similar to the previous study but we have orbital.    BASAL GANGLIA: Basal ganglia are intact.    VENTRICLES: Ventricles are normal in size and  configuration.    POSTERIOR FOSSA: The brain stem and cerebellum are within normal limits.  No mass, mass effect, hemorrhage or recent cortical infarct is present.  The foramen magnum is normal.    SKULL: No acute skull abnormality is seen.  No lytic or sclerotic lesions.    SCALP:Normal    ORBITS, VISUALIZED PARANASAL SINUSES AND MASTOIDS: Paranasal sinuses are mostly clear but for some mucoperiosteal thickening within the left maxillary sinus The mastoid air cells are clear. No orbital pathology.    IMPRESSION:  1.  Normal non contrast CT of the head for age.    Standardized Report:  [id number].    Electronically signed by:  Thomas Biggs MD  5/6/2022 1:33 AM CDT Workstation: [id number]                               Medications   ondansetron injection 4 mg (4 mg Intravenous Given 5/6/22 0133)   meclizine tablet 25 mg (25 mg Oral Given 5/6/22 0133)   iohexoL (OMNIPAQUE 350) injection 100 mL (100 mLs Intravenous Given 5/6/22 0056)   levoFLOXacin 750 mg/150 mL IVPB 750 mg (0 mg Intravenous Stopped 5/6/22 0625)   metoprolol tartrate (LOPRESSOR) tablet 25 mg (25 mg Oral Given 5/6/22 1401)     Medical Decision Making:   ED Management:  87-year-old female brought in by EMS with acute onset of dizziness that she describes the world spinning around her.  Differential includes peripheral versus central vertigo.  Given the patient's clinical exam and her risk factors a workup was obtained.  Imaging is overall unremarkable.  Patient was given Zofran and meclizine.  Her symptoms have improved significantly however she does still remain mildly ataxic.  Additionally her x-ray is concerning for developing pneumonia.  Will cover with antibiotics and admit for further symptomatic control.    Yahaira Drake MD  Emergency Medicine  05/08/2022 3:10 AM                        Clinical Impression:   Final diagnoses:  [R07.9] Chest pain  [R42] Vertigo  [I16.1] Hypertensive emergency  [J18.9] Pneumonia of right upper lobe due to  infectious organism (Primary)          ED Disposition Condition    Observation               Yahaira Drake MD  05/08/22 3913

## 2022-05-06 NOTE — PT/OT/SLP EVAL
Physical Therapy Evaluation and Discharge Note    Patient Name:  Jeanette Evans   MRN:  [id number]    Recommendations:     Discharge Recommendations:  home   Discharge Equipment Recommendations: none   Barriers to discharge: None    Assessment:     Jeanette Evans is a 87 y.o. female admitted with a medical diagnosis of Dizziness. .  At this time, patient is functioning ather  prior level of function and does not require further acute PT services.     Recent Surgery: * No surgery found *      Plan:     During this hospitalization, patient does not require further acute PT services.  Please re-consult if situation changes.      Subjective     Chief Complaint: None reported. No dizziness during PT eval  Patient/Family Comments/goals:  Home with her    Pain/Comfort:  ·      Patients cultural, spiritual, Druze conflicts given the current situation:      Living Environment:  Pt lives with her  in a  1 story house w/o entry steps  Prior to admission, patients level of function was independent .  Equipment used at home: none.  DME owned (not currently used): none.  Upon discharge, patient will have assistance from her  and children.    Objective:     Communicated with nurse prior to session.  Patient found seated at EOB with telemetry, peripheral IV upon PT entry to room.    General Precautions: Standard, fall   Orthopedic Precautions:    Braces:     Respiratory Status: Room air    Exams:  · RLE ROM: WFL  · RLE Strength: WFL  · LLE ROM: WFL  · LLE Strength: WFL    Functional Mobility:  · Transfers:     · Sit to Stand:  contact guard assistance with no AD  · Gait: 200 ft no AD SBA  · Balance: unsupportrd sitting balance ,SBA for standing balance    AM-PAC 6 CLICK MOBILITY  Total Score:        Therapeutic Activities and Exercises:  Pt presented seated at EOB. She speaks on;ly [place]er and had  her children and grendchildren to translate for her. She was  asked if she was dizzy with sitting  and  standing ,and pt reported no dizziness.    AM-PAC 6 CLICK MOBILITY  Total Score:      Patient left up in chair with all lines intact, call button in reach and family members present.    GOALS:   Multidisciplinary Problems     Physical Therapy Goals     Not on file                History:     Past Medical History:   Diagnosis Date    Diabetes mellitus type II     GERD (gastroesophageal reflux disease)     Hypertension        Past Surgical History:   Procedure Laterality Date    CATARACT EXTRACTION      CHOLECYSTECTOMY         Time Tracking:     PT Received On: 05/06/22  PT Start Time: 0941     PT Stop Time: 0954  PT Total Time (min): 13 min     Billable Minutes: Evaluation 5 minutes and Gait Training 8 minutes      05/06/2022

## 2022-05-06 NOTE — PT/OT/SLP EVAL
Occupational Therapy   Evaluation and Discharge Note    Name: Jeanette Evans  MRN: [id number]  Admitting Diagnosis:  Dizziness   Recent Surgery: * No surgery found *      Recommendations:     Discharge Recommendations: home  Discharge Equipment Recommendations:  none  Barriers to discharge:  None    Assessment:     Jeanette Evans is a 87 y.o. female with a medical diagnosis of Dizziness. At this time, patient is functioning at their prior level of function and does not require further acute OT services.     Plan:     During this hospitalization, patient does not require further acute OT services.  Please re-consult if situation changes.    · Plan of Care Reviewed with: patient, daughter, family    Subjective     Chief Complaint: Resolving dizziness  Patient/Family Comments/goals: To go home.    Occupational Profile:  Living Environment: lives with spouse and family in a 1 story home, no steps to enter  Previous level of function: supervision for standing ADL and IADL activity such as cooking secondary to central vision loss.   Roles and Routines: limited   Equipment Used at home:  none  Assistance upon Discharge: spouse and family    Pain/Comfort:  · Pain Rating 1: 0/10  · Pain Rating Post-Intervention 1: 0/10    Patients cultural, spiritual, Shinto conflicts given the current situation: no    Objective:     Communicated with: nurse prior to session.  Patient found sitting edge of bed with telemetry, peripheral IV upon OT entry to room.    General Precautions: Standard,  (None)   Orthopedic Precautions:N/A   Braces: N/A  Respiratory Status: Room air     Occupational Performance:    Bed Mobility:    · Patient completed Scooting/Bridging with stand by assistance  · Patient completed Supine to Sit with stand by assistance    Functional Mobility/Transfers:  · Patient completed Sit <> Stand Transfer with stand by assistance  with  hand-held assist   · Patient completed Toilet Transfer Step Transfer  technique with stand by assistance with  hand-held assist  · Functional Mobility: ambulated 20 feet in the hospital room and bathroom with stand by assistance.    Activities of Daily Living:  · Grooming: stand by assistance to wash hands standing at sink.  · Lower Body Dressing: minimum assistance to don/doff socks sitting EOB. Patient reports relying on spouse for assistance at home.    Cognitive/Visual Perceptual:  Cognitive/Psychosocial Skills:     -       Oriented to: Person, Place and Situation   -       Follows Commands/attention:Follows multistep  commands  -       Communication: Speaks Bengali. Family present and intrepreted throughout session.  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    Physical Exam:  Balance:    -       Sitting/Standing: Stand by Assist  Upper Extremity Range of Motion:     -       Right Upper Extremity: WFL  -       Left Upper Extremity: WFL  Upper Extremity Strength:    -       Right Upper Extremity: WFL  -       Left Upper Extremity: WFL   Strength:    -       Right Upper Extremity: WFL  -       Left Upper Extremity: WFL  Fine Motor Coordination:    -       Intact    AMPAC 6 Click ADL:  AMPAC Total Score: 24    Treatment & Education:  Patient reports no dizziness, ambulating in the room, performed sitting/standing ADL activity with stand by assistance. Has very good family support at home.  Education:    Patient left sitting edge of bed with Physical Therpay in room at end of session.    GOALS:   Multidisciplinary Problems     Occupational Therapy Goals     Not on file                History:     Past Medical History:   Diagnosis Date    Diabetes mellitus type II     GERD (gastroesophageal reflux disease)     Hypertension        Past Surgical  History:   Procedure Laterality Date    CATARACT EXTRACTION      CHOLECYSTECTOMY         Time Tracking:     OT Date of Treatment: 05/06/22  OT Start Time: 0927  OT Stop Time: 0945  OT Total Time (min): 18 min    Billable Minutes:Evaluation 5  Self Care/Home Management 13    5/6/2022

## 2022-05-06 NOTE — ASSESSMENT & PLAN NOTE
According to her daughter, she has been off oral hypoglycemic pain agents per physician instruction  Monitor blood glucose  Low-dose sliding scale insulin

## 2022-05-06 NOTE — SUBJECTIVE & OBJECTIVE
Past Medical History:   Diagnosis Date    Diabetes mellitus type II     GERD (gastroesophageal reflux disease)     Hypertension        Past Surgical History:   Procedure Laterality Date    CATARACT EXTRACTION      CHOLECYSTECTOMY         Review of patient's allergies indicates:   Allergen Reactions    Pcn [penicillins] Anaphylaxis     Other reaction(s): Anaphylaxis       No current facility-administered medications on file prior to encounter.     Current Outpatient Medications on File Prior to Encounter   Medication Sig    albuterol (PROVENTIL/VENTOLIN HFA) 90 mcg/actuation inhaler 2 puffs every 4 hours as needed for cough, wheeze, or shortness of breath    albuterol-ipratropium 2.5mg-0.5mg/3mL (DUO-NEB) 0.5 mg-3 mg(2.5 mg base)/3 mL nebulizer solution     alendronate (FOSAMAX) 70 MG tablet TAKE 1 TABLET BY MOUTH EVERY WEEK    atorvastatin (LIPITOR) 10 MG tablet Take 10 mg by mouth.    cetirizine (ZYRTEC) 10 MG tablet Take 1 tablet (10 mg total) by mouth once daily.    ciclopirox (LOPROX) 0.77 % Crea Apply topically 2 (two) times daily.    cloNIDine (CATAPRES) 0.1 MG tablet TK 1 T PO QD  PRF HIGH BP    DUPIXENT  mg/2 mL PnIj Inject into the skin.    esomeprazole (NEXIUM) 40 MG capsule TK 1 C PO QD    fluticasone (FLONASE) 50 mcg/actuation nasal spray 1 spray (50 mcg total) by Each Nare route once daily.    fluticasone furoate-vilanteroL (BREO ELLIPTA) 200-25 mcg/dose DsDv diskus inhaler Inhale 1 puff into the lungs once daily. Controller    gabapentin (NEURONTIN) 300 MG capsule TAKE ONE CAPSULE BY MOUTH EVERY NIGHT AT BEDTIME FOR 10 DAYS, THEN TWICE DAILY    glimepiride (AMARYL) 2 MG tablet TAKE 1/2 TABLET BY MOUTH DAILY WITH FIRST MAIN MEAL OF THE DAY    glipiZIDE (GLUCOTROL) 5 MG tablet Take 5 mg by mouth every morning.    latanoprost 0.005 % ophthalmic solution Place 1 drop into both eyes every evening.    losartan (COZAAR) 50 MG tablet TK 1 T PO BID    magnesium oxide (MAG-OX) 400 mg (241.3 mg magnesium)  tablet 1 po q hs    metoprolol tartrate (LOPRESSOR) 25 MG tablet TK 1 T PO BID    montelukast (SINGULAIR) 10 mg tablet Take 1 tablet (10 mg total) by mouth every evening.    NEXIUM 40 mg capsule     salicylic acid (SALACYN) 6 % Crea Apply 1 application topically 2 (two) times daily.    VOLTAREN 1 % Gel      Family History       Problem Relation (Age of Onset)    Cancer Mother    Heart disease Father          Tobacco Use    Smoking status: Never Smoker    Smokeless tobacco: Never Used   Substance and Sexual Activity    Alcohol use: No    Drug use: Not on file    Sexual activity: Not on file     Review of Systems   Constitutional:  Positive for fatigue. Negative for fever.   All other systems reviewed and are negative.  Objective:     Vital Signs (Most Recent):  Temp: 97.7 °F (36.5 °C) (05/06/22 0002)  Pulse: 101 (05/06/22 0130)  Resp: 15 (05/06/22 0130)  BP: (!) 166/76 (05/06/22 0030)  SpO2: 97 % (05/06/22 0130)   Vital Signs (24h Range):  Temp:  [97.7 °F (36.5 °C)] 97.7 °F (36.5 °C)  Pulse:  [] 101  Resp:  [15-18] 15  SpO2:  [97 %-98 %] 97 %  BP: (166-172)/(76-89) 166/76        There is no height or weight on file to calculate BMI.    Physical Exam  Constitutional:       Appearance: She is not toxic-appearing or diaphoretic.   HENT:      Head: Normocephalic and atraumatic.      Right Ear: External ear normal.      Left Ear: External ear normal.      Nose: Nose normal.      Mouth/Throat:      Mouth: Mucous membranes are moist.   Eyes:      Conjunctiva/sclera: Conjunctivae normal.   Cardiovascular:      Rate and Rhythm: Normal rate and regular rhythm.      Pulses: Normal pulses.      Heart sounds: Normal heart sounds.   Pulmonary:      Comments: Bronchial breath sounds  Abdominal:      General: Bowel sounds are normal.      Palpations: Abdomen is soft.   Genitourinary:     General: Normal vulva.   Musculoskeletal:      Cervical back: Normal range of motion and neck supple.      Right lower leg: No edema.       Left lower leg: No edema.   Skin:     General: Skin is warm and dry.      Capillary Refill: Capillary refill takes less than 2 seconds.   Neurological:      General: No focal deficit present.      Mental Status: She is alert and oriented to person, place, and time.   Psychiatric:         Mood and Affect: Mood normal.         Behavior: Behavior normal.           Significant Labs: All pertinent labs within the past 24 hours have been reviewed.  CBC:   Recent Labs   Lab 05/06/22 0043   WBC 12.03   HGB 11.2*   HCT 34.6*        CMP:   Recent Labs   Lab 05/06/22 0043      K 4.2      CO2 24   *   BUN 23   CREATININE 1.3   CALCIUM 9.2   PROT 7.4   ALBUMIN 3.8   BILITOT 0.7   ALKPHOS 74   AST 34   ALT 29   ANIONGAP 13   EGFRNONAA 37.0*     Cardiac Markers:   Recent Labs   Lab 05/06/22 0043   *     Coagulation:   Recent Labs   Lab 05/06/22 0043   INR 1.1   APTT 29.2     Magnesium:   Recent Labs   Lab 05/06/22 0043   MG 2.1     Troponin:   Recent Labs   Lab 05/06/22 0043   TROPONINI <0.030       Urine Studies:   Recent Labs   Lab 05/06/22  0157   COLORU Yellow   APPEARANCEUA Clear   PHUR 6.0   SPECGRAV >1.030*   PROTEINUA Negative   GLUCUA Negative   KETONESU Trace*   BILIRUBINUA Negative   OCCULTUA Negative   NITRITE Negative   UROBILINOGEN Negative   LEUKOCYTESUR 3+*   RBCUA 4   WBCUA >100*   BACTERIA Negative   SQUAMEPITHEL 1   HYALINECASTS 9*       Significant Imaging: I have reviewed all pertinent imaging results/findings within the past 24 hours.  CT Head Without Contrast    Result Date: 5/6/2022  EXAM:CT HEAD WITHOUT CONTRAST 5/6/2022 12:41 AM CDT CLINICAL INDICATION: Mental status change, unknown cause COMPARISON: July 30, 2019 TECHNIQUE: Axial CT images of the head are obtained from the skull base to the vertex without IV contrast. Axial, sagittal, and coronal images are interpreted. This exam was performed according to our departmental dose-optimization protocol, which includes  automated exposure control, adjustment of the mA and/or kV according to patient size and/or use of iterative reconstruction technique. FINDINGS: CORTEX: Age-appropriate brain atrophy There is no evidence of cerebral edema, mass, mass effect, hemorrhage, or recent cortical infarct. The gray-white distinction is maintained. WHITE MATTER: Periventricular deep and subcortical white matter disease is similar to the previous study but we have orbital. BASAL GANGLIA: Basal ganglia are intact. VENTRICLES: Ventricles are normal in size and configuration. POSTERIOR FOSSA: The brain stem and cerebellum are within normal limits.  No mass, mass effect, hemorrhage or recent cortical infarct is present.  The foramen magnum is normal. SKULL: No acute skull abnormality is seen.  No lytic or sclerotic lesions. SCALP:Normal ORBITS, VISUALIZED PARANASAL SINUSES AND MASTOIDS: Paranasal sinuses are mostly clear but for some mucoperiosteal thickening within the left maxillary sinus The mastoid air cells are clear. No orbital pathology. IMPRESSION: 1.  Normal non contrast CT of the head for age. Standardized Report:  [id number]. Electronically signed by:  Thomas Biggs MD  5/6/2022 1:33 AM CDT Workstation: [id number]    CTA Head and Neck (xpd)    Result Date: 5/6/2022  EXAM: CT Angiography Head and Neck With Intravenous Contrast CLINICAL HISTORY: The patient is 87 years old and is Female; Dizziness, non-specific TECHNIQUE: Axial computed tomographic angiography images of the head and neck with intravenous contrast.  Measurements of carotid stenosis were determined utilizing NASCET criteria.  Sagittal and coronal reformatted images were created and reviewed.  This CT exam was performed using one or more of the following dose reduction techniques:  automated exposure control, adjustment of the mA and/or kV according to patient size, and/or use of iterative reconstruction technique. MIP reconstructed images were created and reviewed.  COMPARISON: No relevant prior studies available. FINDINGS: HEAD: RIGHT ANTERIOR CEREBRAL ARTERY:  No occlusion or significant stenosis.  No aneurysm. RIGHT MIDDLE CEREBRAL ARTERY:  No occlusion or significant stenosis.  No aneurysm. RIGHT POSTERIOR CEREBRAL ARTERY:  There is persistent fetal origin of the right posterior cerebral artery. No significant stenosis or occlusion. RIGHT INTRACRANIAL INTERNAL CAROTID ARTERY:  Unremarkable.  No significant stenosis.  No dissection or occlusion. RIGHT INTRACRANIAL VERTEBRAL ARTERY:  Unremarkable.  No significant stenosis.  No dissection or occlusion. LEFT ANTERIOR CEREBRAL ARTERY:  No occlusion or significant stenosis.  No aneurysm. LEFT MIDDLE CEREBRAL ARTERY:  No occlusion or significant stenosis.  No aneurysm. LEFT POSTERIOR CEREBRAL ARTERY:  There is persistent fetal origin of the left posterior cerebral artery. No significant stenosis or occlusion. LEFT INTRACRANIAL INTERNAL CAROTID ARTERY:  Unremarkable.  No significant stenosis.  No dissection or occlusion. LEFT INTRACRANIAL VERTEBRAL ARTERY:  Unremarkable.  No significant stenosis.  No dissection or occlusion. BASILAR ARTERY:  No occlusion or significant stenosis.  No aneurysm. NECK: RIGHT COMMON CAROTID ARTERY:  No significant stenosis.  No dissection or occlusion. RIGHT EXTRACRANIAL INTERNAL CAROTID ARTERY:  Unremarkable.  No significant stenosis.  No dissection or occlusion. RIGHT EXTERNAL CAROTID ARTERY:  Unremarkable.  No occlusion. RIGHT EXTRACRANIAL VERTEBRAL ARTERY:  Unremarkable.  No significant stenosis.  No dissection or occlusion. LEFT COMMON CAROTID ARTERY:  No significant stenosis.  No dissection or occlusion. LEFT EXTRACRANIAL INTERNAL CAROTID ARTERY:  Unremarkable.  No significant stenosis.  No dissection or occlusion. LEFT EXTERNAL CAROTID ARTERY:  Unremarkable.  No occlusion. LEFT EXTRACRANIAL VERTEBRAL ARTERY:  There is atherosclerotic calcification at the origin of the left vertebral artery, with  mild associated narrowing of the vertebral artery at this level. No occlusion. HEAD and NECK: BONES/JOINTS:  No acute fracture.  No dislocation. SOFT TISSUES:  Unremarkable.  No mass. CAROTID STENOSIS REFERENCE USING NASCET CRITERIA: % ICA stenosis = (1 - narrowest ICA diameter/diameter of distal cervical ICA) x 100. Mild - <50% stenosis. Moderate - 50-69% stenosis. Severe - 70-94% stenosis. Near occlusion - 95-99% stenosis. Occluded - 100% stenosis. IMPRESSION: No significant stenosis or occlusion within the arterial vasculature of the head and neck. Electronically signed by:  Tegan Robin MD  5/6/2022 1:33 AM CDT Workstation: [id number]

## 2022-05-06 NOTE — PLAN OF CARE
05/06/22 1430   Final Note   Assessment Type Final Discharge Note   Anticipated Discharge Disposition Home   Hospital Resources/Appts/Education Provided Appointments scheduled and added to AVS   Patient cleared for discharge from case management standpoint.    Follow up appointments scheduled and added to AVS.    Chart and discharge orders reviewed.  Patient discharged home with no further case management needs.

## 2022-05-06 NOTE — HPI
87-year-old nonsmoker  female with history of diabetes mellitus (now diet controlled), GERD, hypertension  Presented to the emergency department via EMS with dizziness  Information is provided by her daughter at bedside as the patient speaks [place] only.  Reports having nonproductive cough for the past few days.  Tested negative for COVID-19.    Has been complaining of intermittent headache  Had sudden onset of dizziness after using albuterol inhaler, prior to arrival.  Had nausea with multiple episodes of vomiting.  Noted to have high blood pressure with systolic in the 200s. Given clonidine by her daughter, prior to arrival  Denies focal deficits, numbness, visual changes.    Afebrile  WBC 12.03  CMP unremarkable  UA showed negative nitrite, 3+ leukocyte, > 100 WBC, 9 hyaline casts  CT head negative  CTA head and neck:  No significant stenosis or occlusion within arterial vasculature of the head and neck  Chest imaging:  Right upper lobe infiltrates

## 2022-05-06 NOTE — H&P
[facility] - Emergency Dept  [facility] Medicine  History & Physical  Face to face encounter: 5/6/2022    Patient Name: Jeanette Evans  MRN: [id number]  Patient Class: OP- Observation  Admission Date: 5/6/2022  Attending Physician: Gold Chance MD  Primary Care Provider: Mervin Killian MD         Patient information was obtained from patient, relative(s), past medical records and ER records.     Subjective:     Principal Problem:Dizziness    Chief Complaint:   Chief Complaint   Patient presents with    Hypertension    Fatigue     X 2-3 hrs    Nausea        HPI: 87-year-old nonsmoker  female with history of diabetes mellitus (now diet controlled), GERD, hypertension  Presented to the emergency department via EMS with dizziness  Information is provided by her daughter at bedside as the patient speaks Ukrainian only.  Reports having nonproductive cough for the past few days.  Tested negative for COVID-19.    Has been complaining of intermittent headache  Had sudden onset of dizziness after using albuterol inhaler, prior to arrival.  Had nausea with multiple episodes of vomiting.  Noted to have high blood pressure with systolic in the 200s. Given clonidine by her daughter, prior to arrival  Denies focal deficits, numbness, visual changes.    Afebrile  WBC 12.03  CMP unremarkable  UA showed negative nitrite, 3+ leukocyte, > 100 WBC, 9 hyaline casts  CT head negative  CTA head and neck:  No significant stenosis or occlusion within arterial vasculature of the head and neck  Chest imaging:  Right upper lobe infiltrates      Past Medical History:   Diagnosis Date    Diabetes mellitus type II     GERD (gastroesophageal reflux disease)     Hypertension        Past Surgical History:   Procedure Laterality Date    CATARACT EXTRACTION      CHOLECYSTECTOMY         Review of patient's allergies indicates:   Allergen Reactions    Pcn [penicillins] Anaphylaxis     Other reaction(s): Anaphylaxis       No  current facility-administered medications on file prior to encounter.     Current Outpatient Medications on File Prior to Encounter   Medication Sig    albuterol (PROVENTIL/VENTOLIN HFA) 90 mcg/actuation inhaler 2 puffs every 4 hours as needed for cough, wheeze, or shortness of breath    albuterol-ipratropium 2.5mg-0.5mg/3mL (DUO-NEB) 0.5 mg-3 mg(2.5 mg base)/3 mL nebulizer solution     alendronate (FOSAMAX) 70 MG tablet TAKE 1 TABLET BY MOUTH EVERY WEEK    atorvastatin (LIPITOR) 10 MG tablet Take 10 mg by mouth.    cetirizine (ZYRTEC) 10 MG tablet Take 1 tablet (10 mg total) by mouth once daily.    ciclopirox (LOPROX) 0.77 % Crea Apply topically 2 (two) times daily.    cloNIDine (CATAPRES) 0.1 MG tablet TK 1 T PO QD  PRF HIGH BP    DUPIXENT  mg/2 mL PnIj Inject into the skin.    esomeprazole (NEXIUM) 40 MG capsule TK 1 C PO QD    fluticasone (FLONASE) 50 mcg/actuation nasal spray 1 spray (50 mcg total) by Each Nare route once daily.    fluticasone furoate-vilanteroL (BREO ELLIPTA) 200-25 mcg/dose DsDv diskus inhaler Inhale 1 puff into the lungs once daily. Controller    gabapentin (NEURONTIN) 300 MG capsule TAKE ONE CAPSULE BY MOUTH EVERY NIGHT AT BEDTIME FOR 10 DAYS, THEN TWICE DAILY    glimepiride (AMARYL) 2 MG tablet TAKE 1/2 TABLET BY MOUTH DAILY WITH FIRST MAIN MEAL OF THE DAY    glipiZIDE (GLUCOTROL) 5 MG tablet Take 5 mg by mouth every morning.    latanoprost 0.005 % ophthalmic solution Place 1 drop into both eyes every evening.    losartan (COZAAR) 50 MG tablet TK 1 T PO BID    magnesium oxide (MAG-OX) 400 mg (241.3 mg magnesium) tablet 1 po q hs    metoprolol tartrate (LOPRESSOR) 25 MG tablet TK 1 T PO BID    montelukast (SINGULAIR) 10 mg tablet Take 1 tablet (10 mg total) by mouth every evening.    NEXIUM 40 mg capsule     salicylic acid (SALACYN) 6 % Crea Apply 1 application topically 2 (two) times daily.    VOLTAREN 1 % Gel      Family History       Problem Relation (Age of  Onset)    Cancer Mother    Heart disease Father          Tobacco Use    Smoking status: Never Smoker    Smokeless tobacco: Never Used   Substance and Sexual Activity    Alcohol use: No    Drug use: Not on file    Sexual activity: Not on file     Review of Systems   Constitutional:  Positive for fatigue. Negative for fever.   All other systems reviewed and are negative.  Objective:     Vital Signs (Most Recent):  Temp: 97.7 °F (36.5 °C) (05/06/22 0002)  Pulse: 101 (05/06/22 0130)  Resp: 15 (05/06/22 0130)  BP: (!) 166/76 (05/06/22 0030)  SpO2: 97 % (05/06/22 0130)   Vital Signs (24h Range):  Temp:  [97.7 °F (36.5 °C)] 97.7 °F (36.5 °C)  Pulse:  [] 101  Resp:  [15-18] 15  SpO2:  [97 %-98 %] 97 %  BP: (166-172)/(76-89) 166/76        There is no height or weight on file to calculate BMI.    Physical Exam  Constitutional:       Appearance: She is not toxic-appearing or diaphoretic.   HENT:      Head: Normocephalic and atraumatic.      Right Ear: External ear normal.      Left Ear: External ear normal.      Nose: Nose normal.      Mouth/Throat:      Mouth: Mucous membranes are moist.   Eyes:      Conjunctiva/sclera: Conjunctivae normal.   Cardiovascular:      Rate and Rhythm: Normal rate and regular rhythm.      Pulses: Normal pulses.      Heart sounds: Normal heart sounds.   Pulmonary:      Comments: Bronchial breath sounds  Abdominal:      General: Bowel sounds are normal.      Palpations: Abdomen is soft.   Genitourinary:     General: Normal vulva.   Musculoskeletal:      Cervical back: Normal range of motion and neck supple.      Right lower leg: No edema.      Left lower leg: No edema.   Skin:     General: Skin is warm and dry.      Capillary Refill: Capillary refill takes less than 2 seconds.   Neurological:      General: No focal deficit present.      Mental Status: She is alert and oriented to person, place, and time.   Psychiatric:         Mood and Affect: Mood normal.         Behavior: Behavior  normal.           Significant Labs: All pertinent labs within the past 24 hours have been reviewed.  CBC:   Recent Labs   Lab 05/06/22 0043   WBC 12.03   HGB 11.2*   HCT 34.6*        CMP:   Recent Labs   Lab 05/06/22 0043      K 4.2      CO2 24   *   BUN 23   CREATININE 1.3   CALCIUM 9.2   PROT 7.4   ALBUMIN 3.8   BILITOT 0.7   ALKPHOS 74   AST 34   ALT 29   ANIONGAP 13   EGFRNONAA 37.0*     Cardiac Markers:   Recent Labs   Lab 05/06/22 0043   *     Coagulation:   Recent Labs   Lab 05/06/22 0043   INR 1.1   APTT 29.2     Magnesium:   Recent Labs   Lab 05/06/22 0043   MG 2.1     Troponin:   Recent Labs   Lab 05/06/22 0043   TROPONINI <0.030       Urine Studies:   Recent Labs   Lab 05/06/22 0157   COLORU Yellow   APPEARANCEUA Clear   PHUR 6.0   SPECGRAV >1.030*   PROTEINUA Negative   GLUCUA Negative   KETONESU Trace*   BILIRUBINUA Negative   OCCULTUA Negative   NITRITE Negative   UROBILINOGEN Negative   LEUKOCYTESUR 3+*   RBCUA 4   WBCUA >100*   BACTERIA Negative   SQUAMEPITHEL 1   HYALINECASTS 9*       Significant Imaging: I have reviewed all pertinent imaging results/findings within the past 24 hours.  CT Head Without Contrast    Result Date: 5/6/2022  EXAM:CT HEAD WITHOUT CONTRAST 5/6/2022 12:41 AM CDT CLINICAL INDICATION: Mental status change, unknown cause COMPARISON: July 30, 2019 TECHNIQUE: Axial CT images of the head are obtained from the skull base to the vertex without IV contrast. Axial, sagittal, and coronal images are interpreted. This exam was performed according to our departmental dose-optimization protocol, which includes automated exposure control, adjustment of the mA and/or kV according to patient size and/or use of iterative reconstruction technique. FINDINGS: CORTEX: Age-appropriate brain atrophy There is no evidence of cerebral edema, mass, mass effect, hemorrhage, or recent cortical infarct. The gray-white distinction is maintained. WHITE MATTER:  Periventricular deep and subcortical white matter disease is similar to the previous study but we have orbital. BASAL GANGLIA: Basal ganglia are intact. VENTRICLES: Ventricles are normal in size and configuration. POSTERIOR FOSSA: The brain stem and cerebellum are within normal limits.  No mass, mass effect, hemorrhage or recent cortical infarct is present.  The foramen magnum is normal. SKULL: No acute skull abnormality is seen.  No lytic or sclerotic lesions. SCALP:Normal ORBITS, VISUALIZED PARANASAL SINUSES AND MASTOIDS: Paranasal sinuses are mostly clear but for some mucoperiosteal thickening within the left maxillary sinus The mastoid air cells are clear. No orbital pathology. IMPRESSION: 1.  Normal non contrast CT of the head for age. Standardized Report:  [id number]. Electronically signed by:  Thomas Biggs MD  5/6/2022 1:33 AM CDT Workstation: [id number]    CTA Head and Neck (xpd)    Result Date: 5/6/2022  EXAM: CT Angiography Head and Neck With Intravenous Contrast CLINICAL HISTORY: The patient is 87 years old and is Female; Dizziness, non-specific TECHNIQUE: Axial computed tomographic angiography images of the head and neck with intravenous contrast.  Measurements of carotid stenosis were determined utilizing NASCET criteria.  Sagittal and coronal reformatted images were created and reviewed.  This CT exam was performed using one or more of the following dose reduction techniques:  automated exposure control, adjustment of the mA and/or kV according to patient size, and/or use of iterative reconstruction technique. MIP reconstructed images were created and reviewed. COMPARISON: No relevant prior studies available. FINDINGS: HEAD: RIGHT ANTERIOR CEREBRAL ARTERY:  No occlusion or significant stenosis.  No aneurysm. RIGHT MIDDLE CEREBRAL ARTERY:  No occlusion or significant stenosis.  No aneurysm. RIGHT POSTERIOR CEREBRAL ARTERY:  There is persistent fetal origin of the right posterior cerebral artery.  No significant stenosis or occlusion. RIGHT INTRACRANIAL INTERNAL CAROTID ARTERY:  Unremarkable.  No significant stenosis.  No dissection or occlusion. RIGHT INTRACRANIAL VERTEBRAL ARTERY:  Unremarkable.  No significant stenosis.  No dissection or occlusion. LEFT ANTERIOR CEREBRAL ARTERY:  No occlusion or significant stenosis.  No aneurysm. LEFT MIDDLE CEREBRAL ARTERY:  No occlusion or significant stenosis.  No aneurysm. LEFT POSTERIOR CEREBRAL ARTERY:  There is persistent fetal origin of the left posterior cerebral artery. No significant stenosis or occlusion. LEFT INTRACRANIAL INTERNAL CAROTID ARTERY:  Unremarkable.  No significant stenosis.  No dissection or occlusion. LEFT INTRACRANIAL VERTEBRAL ARTERY:  Unremarkable.  No significant stenosis.  No dissection or occlusion. BASILAR ARTERY:  No occlusion or significant stenosis.  No aneurysm. NECK: RIGHT COMMON CAROTID ARTERY:  No significant stenosis.  No dissection or occlusion. RIGHT EXTRACRANIAL INTERNAL CAROTID ARTERY:  Unremarkable.  No significant stenosis.  No dissection or occlusion. RIGHT EXTERNAL CAROTID ARTERY:  Unremarkable.  No occlusion. RIGHT EXTRACRANIAL VERTEBRAL ARTERY:  Unremarkable.  No significant stenosis.  No dissection or occlusion. LEFT COMMON CAROTID ARTERY:  No significant stenosis.  No dissection or occlusion. LEFT EXTRACRANIAL INTERNAL CAROTID ARTERY:  Unremarkable.  No significant stenosis.  No dissection or occlusion. LEFT EXTERNAL CAROTID ARTERY:  Unremarkable.  No occlusion. LEFT EXTRACRANIAL VERTEBRAL ARTERY:  There is atherosclerotic calcification at the origin of the left vertebral artery, with mild associated narrowing of the vertebral artery at this level. No occlusion. HEAD and NECK: BONES/JOINTS:  No acute fracture.  No dislocation. SOFT TISSUES:  Unremarkable.  No mass. CAROTID STENOSIS REFERENCE USING NASCET CRITERIA: % ICA stenosis = (1 - narrowest ICA diameter/diameter of distal cervical ICA) x 100. Mild - <50% stenosis.  Moderate - 50-69% stenosis. Severe - 70-94% stenosis. Near occlusion - 95-99% stenosis. Occluded - 100% stenosis. IMPRESSION: No significant stenosis or occlusion within the arterial vasculature of the head and neck. Electronically signed by:  Tegan Robin MD  5/6/2022 1:33 AM CDT Workstation: [id number]       Assessment/Plan:     * Dizziness  With nausea and vomiting  Concern for posterior circulation stroke  Stroke protocol  Neuro checks q 4 hours  Pulse oximetry q.4 hours  Cardiac monitor for arrhythmia  Aspiration precautions  Fall precautions  NPO til passing nursing swallow assessment  Permissive hypertension  Labetalol  IV p.r.n. for systolic blood pressure above 220 or diastolic blood pressure above 100  Anti-lipidemics with atorvastatin 40 mg daily  Anti-thrombotic with aspirin 81 mg daily  Labs:  CMP, magnesium, CBC daily  Consult and treat:  PT, OT, ST, dietitian, case management  Consult Neurology  Imaging: MRI brain, echocardiogram with bubble study          Hypertension  Chronic medical condition  Permissive hypertension for now  Labetalol IV p.r.n.        Pneumonia  IV Levaquin started in the ED, continue  Follow blood culture      UTI (urinary tract infection)  IV Levaquin started in the ED, continue  Follow urine culture      Diabetes mellitus type II  According to her daughter, she has been off oral hypoglycemic pain agents per physician instruction  Monitor blood glucose  Low-dose sliding scale insulin      VTE Risk Mitigation (From admission, onward)         Ordered     enoxaparin injection 30 mg  Daily         05/06/22 0444     IP VTE HIGH RISK PATIENT  Once         05/06/22 0431     Place sequential compression device  Until discontinued         05/06/22 0431                   CHLOE Foster  Department of Hospital Medicine   [facility] - Emergency Dept

## 2022-05-07 NOTE — DISCHARGE SUMMARY
[facility] Medicine  Discharge Summary      Patient Name: Jeanette Evans  MRN: [id number]  Patient Class: OP- Observation  Admission Date: 5/6/2022  Hospital Length of Stay: 0 days  Discharge Date and Time:  05/06/2022 8:55 PM  Attending Physician: No att. providers found   Discharging Provider: Buck Dempsey MD  Primary Care Provider: Mervin Killian MD      HPI:   87-year-old nonsmoker  female with history of diabetes mellitus (now diet controlled), GERD, hypertension  Presented to the emergency department via EMS with dizziness  Information is provided by her daughter at bedside as the patient speaks Chinese only.  Reports having nonproductive cough for the past few days.  Tested negative for COVID-19.    Has been complaining of intermittent headache  Had sudden onset of dizziness after using albuterol inhaler, prior to arrival.  Had nausea with multiple episodes of vomiting.  Noted to have high blood pressure with systolic in the 200s. Given clonidine by her daughter, prior to arrival  Denies focal deficits, numbness, visual changes.    Afebrile  WBC 12.03  CMP unremarkable  UA showed negative nitrite, 3+ leukocyte, > 100 WBC, 9 hyaline casts  CT head negative  CTA head and neck:  No significant stenosis or occlusion within arterial vasculature of the head and neck  Chest imaging:  Right upper lobe infiltrates      * No surgery found *      Hospital Course:   Patient admitted with possible TIA/CVA  CT brain and CTA were WNL  Was unable to obtain MRI because pt was anxious and Claustrophobic  Pt recently suffered from viral illness and still has post viral cough syndrome  Pt already is taking steroids and benzos prescribed by PCP for possible ? Vestibular neuronitis  Meclizine was added to regime along with tussionex for incessant cough  Pt was instructed to keep taking scheduled ARB/Bbs along with PRN basis Clonidine'  Later pt was discharged to home        Goals of Care  Treatment Preferences:  Code Status: Full Code      Consults:   Consults (From admission, onward)        Status Ordering Provider     IP consult to case management/social work  Once        Provider:  (Not yet assigned)    REYMUNDO Kearns          No new Assessment & Plan notes have been filed under this hospital service since the last note was generated.  Service: Hospital Medicine    Final Active Diagnoses:    Diagnosis Date Noted POA    Hypertension [I10] 04/06/2017 Yes    Diabetes mellitus type II [E11.9] 04/06/2017 Yes      Problems Resolved During this Admission:    Diagnosis Date Noted Date Resolved POA    PRINCIPAL PROBLEM:  Dizziness [R42] 05/06/2022 05/06/2022 Yes    Pneumonia [J18.9] 05/06/2022 05/06/2022 Yes    UTI (urinary tract infection) [N39.0] 05/06/2022 05/06/2022 Yes       Discharged Condition: good    Disposition: Home or Self Care    Follow Up:   Follow-up Information     Mervin Killian MD Follow up on 5/10/2022.    Specialty: Family Medicine  Why: Valarie de seguimiento a las 2:30 de la tarde  Contact information:  [address] [facility] [address]  [place] [address]  [phone]                       Patient Instructions:      Diet Cardiac       Significant Diagnostic Studies: Labs:   CMP   Recent Labs   Lab 05/06/22 0043      K 4.2      CO2 24   *   BUN 23   CREATININE 1.3   CALCIUM 9.2   PROT 7.4   ALBUMIN 3.8   BILITOT 0.7   ALKPHOS 74   AST 34   ALT 29   ANIONGAP 13   ESTGFRAFRICA 42.6*   EGFRNONAA 37.0*    and CBC   Recent Labs   Lab 05/06/22 0043   WBC 12.03   HGB 11.2*   HCT 34.6*          Pending Diagnostic Studies:     None         Medications:  Reconciled Home Medications:      Medication List      START taking these medications    aspirin 81 MG EC tablet  Commonly known as: ECOTRIN  Take 1 tablet (81 mg total) by mouth once daily.  Start taking on: May 7, 2022     hydrocodone-chlorpheniramine 10-8 mg/5 mL suspension  Commonly known as: TUSSIONEX  Take 2.5  mLs by mouth every 12 (twelve) hours as needed for Cough.     meclizine 25 mg tablet  Commonly known as: ANTIVERT  Take 1 tablet (25 mg total) by mouth 2 (two) times a day. for 10 doses        CHANGE how you take these medications    esomeprazole 40 MG capsule  Commonly known as: NEXIUM  TK 1 C PO QD  What changed: Another medication with the same name was removed. Continue taking this medication, and follow the directions you see here.        CONTINUE taking these medications    albuterol 90 mcg/actuation inhaler  Commonly known as: PROVENTIL/VENTOLIN HFA  2 puffs every 4 hours as needed for cough, wheeze, or shortness of breath     albuterol-ipratropium 2.5 mg-0.5 mg/3 mL nebulizer solution  Commonly known as: DUO-NEB     alendronate 70 MG tablet  Commonly known as: FOSAMAX  TAKE 1 TABLET BY MOUTH EVERY WEEK     atorvastatin 10 MG tablet  Commonly known as: LIPITOR  Take 10 mg by mouth.     BREO ELLIPTA 200-25 mcg/dose Dsdv diskus inhaler  Generic drug: fluticasone furoate-vilanteroL  Inhale 1 puff into the lungs once daily. Controller     cloNIDine 0.1 MG tablet  Commonly known as: CATAPRES  TK 1 T PO QD  PRF HIGH BP     DUPIXENT  mg/2 mL Pnij  Generic drug: dupilumab  Inject into the skin.     latanoprost 0.005 % ophthalmic solution  Place 1 drop into both eyes every evening.     losartan 50 MG tablet  Commonly known as: COZAAR  TK 1 T PO BID     magnesium oxide 400 mg (241.3 mg magnesium) tablet  Commonly known as: MAG-OX  1 po q hs     metoprolol tartrate 25 MG tablet  Commonly known as: LOPRESSOR  TK 1 T PO BID     montelukast 10 mg tablet  Commonly known as: SINGULAIR  Take 1 tablet (10 mg total) by mouth every evening.        STOP taking these medications    cetirizine 10 MG tablet  Commonly known as: ZYRTEC     ciclopirox 0.77 % Crea  Commonly known as: LOPROX     fluticasone propionate 50 mcg/actuation nasal spray  Commonly known as: FLONASE     gabapentin 300 MG capsule  Commonly known as:  NEURONTIN     glimepiride 2 MG tablet  Commonly known as: AMARYL     glipiZIDE 5 MG tablet  Commonly known as: GLUCOTROL     salicylic acid 6 % Crea  Commonly known as: SALACYN     VOLTAREN 1 % Gel  Generic drug: diclofenac sodium            Indwelling Lines/Drains at time of discharge:   Lines/Drains/Airways     None               Physical Exam  Cardiovascular:      Rate and Rhythm: Normal rate.   Neurological:      Mental Status: She is alert and oriented to person, place, and time.       Time spent on the discharge of patient: 25  minutes         Buck Dempsey MD  Department of Hospital Medicine  [facility]

## 2022-05-07 NOTE — HOSPITAL COURSE
Patient admitted with possible TIA/CVA  CT brain and CTA were WNL  Was unable to obtain MRI because pt was anxious and Claustrophobic  Pt recently suffered from viral illness and still has post viral cough syndrome  Pt already is taking steroids and benzos prescribed by PCP for possible ? Vestibular neuronitis  Meclizine was added to regime along with tussionex for incessant cough  Pt was instructed to keep taking scheduled ARB/Bbs along with PRN basis Clonidine'  Later pt was discharged to home

## 2022-05-08 LAB
BACTERIA BLD CULT: ABNORMAL
BACTERIA UR CULT: NORMAL
BACTERIA UR CULT: NORMAL

## 2022-05-11 LAB — BACTERIA BLD CULT: NORMAL

## 2022-06-01 ENCOUNTER — OFFICE VISIT (OUTPATIENT)
Dept: PULMONOLOGY | Facility: CLINIC | Age: 87
End: 2022-06-01
Payer: MEDICARE

## 2022-06-01 VITALS
RESPIRATION RATE: 18 BRPM | OXYGEN SATURATION: 97 % | WEIGHT: 150.69 LBS | HEIGHT: 58 IN | SYSTOLIC BLOOD PRESSURE: 107 MMHG | HEART RATE: 73 BPM | DIASTOLIC BLOOD PRESSURE: 64 MMHG | BODY MASS INDEX: 31.63 KG/M2

## 2022-06-01 DIAGNOSIS — J45.20 MILD INTERMITTENT ASTHMA WITHOUT COMPLICATION: Primary | ICD-10-CM

## 2022-06-01 DIAGNOSIS — R09.89 CHRONIC SINUS COMPLAINTS: ICD-10-CM

## 2022-06-01 DIAGNOSIS — H91.92 HEARING LOSS OF LEFT EAR, UNSPECIFIED HEARING LOSS TYPE: ICD-10-CM

## 2022-06-01 PROCEDURE — 1159F PR MEDICATION LIST DOCUMENTED IN MEDICAL RECORD: ICD-10-PCS | Mod: CPTII,S$GLB,, | Performed by: INTERNAL MEDICINE

## 2022-06-01 PROCEDURE — 1159F MED LIST DOCD IN RCRD: CPT | Mod: CPTII,S$GLB,, | Performed by: INTERNAL MEDICINE

## 2022-06-01 PROCEDURE — 99999 PR PBB SHADOW E&M-EST. PATIENT-LVL V: ICD-10-PCS | Mod: PBBFAC,,, | Performed by: INTERNAL MEDICINE

## 2022-06-01 PROCEDURE — 3288F FALL RISK ASSESSMENT DOCD: CPT | Mod: CPTII,S$GLB,, | Performed by: INTERNAL MEDICINE

## 2022-06-01 PROCEDURE — 1101F PR PT FALLS ASSESS DOC 0-1 FALLS W/OUT INJ PAST YR: ICD-10-PCS | Mod: CPTII,S$GLB,, | Performed by: INTERNAL MEDICINE

## 2022-06-01 PROCEDURE — 3288F PR FALLS RISK ASSESSMENT DOCUMENTED: ICD-10-PCS | Mod: CPTII,S$GLB,, | Performed by: INTERNAL MEDICINE

## 2022-06-01 PROCEDURE — 99214 PR OFFICE/OUTPT VISIT, EST, LEVL IV, 30-39 MIN: ICD-10-PCS | Mod: S$GLB,,, | Performed by: INTERNAL MEDICINE

## 2022-06-01 PROCEDURE — 99214 OFFICE O/P EST MOD 30 MIN: CPT | Mod: S$GLB,,, | Performed by: INTERNAL MEDICINE

## 2022-06-01 PROCEDURE — 1126F AMNT PAIN NOTED NONE PRSNT: CPT | Mod: CPTII,S$GLB,, | Performed by: INTERNAL MEDICINE

## 2022-06-01 PROCEDURE — 99999 PR PBB SHADOW E&M-EST. PATIENT-LVL V: CPT | Mod: PBBFAC,,, | Performed by: INTERNAL MEDICINE

## 2022-06-01 PROCEDURE — 1126F PR PAIN SEVERITY QUANTIFIED, NO PAIN PRESENT: ICD-10-PCS | Mod: CPTII,S$GLB,, | Performed by: INTERNAL MEDICINE

## 2022-06-01 PROCEDURE — 1101F PT FALLS ASSESS-DOCD LE1/YR: CPT | Mod: CPTII,S$GLB,, | Performed by: INTERNAL MEDICINE

## 2022-06-01 NOTE — PATIENT INSTRUCTIONS
Audiology evaluation  Continue asthma regimen  Continue allergy medicines  Continue dupixent shots- per dermatology for eczema

## 2022-06-01 NOTE — PROGRESS NOTES
6/1/2022    Jeanette Evans  Follow up    Chief Complaint   Patient presents with    Follow-up     4 months - doing good       HPI:   06/01/2022- pt was recently seen in ED for elevated BP. She had been coughing, took nebulizer, rescue inhaler and tessalon all within 15 min.   Denies cough/sob  In morning she has nasal congestion and allergies  Uses breo daily, flonase nasal spray, dupixent shots- per derm for eczema  She sleeps well    01/27/2022-   Continue inhaler regimen, nebulizer as needed  Continue singulair  Allergist follow up   pt started dupixent per allergist (for nasal allergies) and doing well. Had covid and minimal symptoms but still went to  and got prednisone 5d treatment.  Continues on breo inhaler daily, not needing rescue inhaler. Not coughing.    09/27/2021-   Continue breo inhaler one puff daily  Albuterol inhaler as needed  Nebulizer as needed  Continue singulair  If flare up of asthma- take prednisone 20mg one pill daily for 3 days, repeat as needed  Tessalon as needed for cough  Check covid antibodies- then can decide whether you want to get 3rd covid shot  When flu shot available recommended to get it  pt here with daughter who assists w/ history. Coughing 2 wks, same as , now getting better. Took prednisone, steroid shot from pcp. covid negative  Uses breo every day. Needs more rescue inhaler- uses only when sick. Has nebulizer as well. Went almost 2 yrs since last exacerbation.  Uses flonase daily. singulair tabs nightly.  No night wakenings w/ asthma. Sometimes coughs at night. Better w/ tessalon. She stays very active and likes to cook. Traveled to [facility] June 2021.  She wants to get 3rd covid shot and flu shot soon      02/26/2020-   Continue prednisone 1/2 tab a day for 3 more days then stop  Tessalon ordered for cough  Can use Mucinex for congestion as needed  Continue breo inhaler  New nebulizer ordered- w/ albuterol- use as needed  No more antibiotics needed-  "probably viral infection, getting better  Cough can last 6-8 weeks after a cold, continue symptomatic treatments and should get better with time  Hx given by daughter. coughing x 10 days, initially w/ green & brown phlegm.  Cough worse at night. A/w chest tightness and shortness of breath but now better.  Had contact w/ sick relative.  Cough is getting better now- less phlegm now dry cough. Taking "emergency kit" which is azithromycin, prednisone and albuterol inhaler. Reports nebulizer machine is broken. She has had flu shot and pneumonia shot (4 yrs ago). Last year did well w/o exacerbation, this is the first one in a long time.   Inhalers: breo (daily), albuterol (only when sick)    Sept 25, 2019-  singulair might be enough - controls sinuses and asthma,  Breathing test normal range.  Would continue singulair into future.  breo might not be needed?  Would use til April next yr- if no cough/short breath consider skipping.  Follow here as needed - refils with Dr Killian but call if any problems?  No symptoms, sinuses good and no resp, uses breo and singulair , no rescue, pft nl range      April 23, 2019-some english speaking from [place], with daughter, never smoker, functions with no limits, has macular degeneration.  Pt was .  Has cough and wheezes and sob is minimal.  Seasonal variations with worse in fall and cold weather.  Sees Dr Killian - no steroid shots, pt's daughter had asthma. Pt had no problems til adult life, worsening last 5 yrs.  Pt forgets to use symbicort, uses prn resp rx mainly.  Prednisone use maybe 3times doc visits last yr.  No er visits nor admits.    Pt chr sinus drip and headaches.  Clear mucous.  Smell is good.not clearly cause lung problems?  Vague intermittent right upper abd or lower chest wall pain off and on  Yrs.    Patient Instructions   Chronic sinus- use Singulair daily and use flonase 1-2 daily if drainage  Persistent asthma - severe-- breathing will " worsen with asthma and improve.  Should be normal between.  Common for chronic disease to be under appreciated.   Preventive- breo once daily and singulair daily   Rescue - use albuterol inhaler 1-3 as needed or nebulizer up to every 4 hours as needed   Action plan- if albuterol not working nor lasting, or having bad symptoms - take prednisone daily for 3 days - may repeat if not controlled.       - may use azithromycin if infection suspected - a pill daily for 3 days will last 10days  Best to treat asthma cough with asthma therapy.  Check chest xray with right side chest/upper abdominal pain ongoing last wk to years?  Check lung capacity on good day.  Seasonal use medications may be reasonable  The chief compliant  problem is varies w/ instability at times  PFSH:  Past Medical History:   Diagnosis Date    Diabetes mellitus type II     GERD (gastroesophageal reflux disease)     Hypertension          Past Surgical History:   Procedure Laterality Date    CATARACT EXTRACTION      CHOLECYSTECTOMY       Social History     Tobacco Use    Smoking status: Never Smoker    Smokeless tobacco: Never Used   Substance Use Topics    Alcohol use: No     Family History   Problem Relation Age of Onset    Cancer Mother     Heart disease Father     Amblyopia Neg Hx     Blindness Neg Hx     Cataracts Neg Hx     Glaucoma Neg Hx     Macular degeneration Neg Hx     Retinal detachment Neg Hx     Strabismus Neg Hx      Review of patient's allergies indicates:   Allergen Reactions    Pcn [penicillins] Anaphylaxis     Other reaction(s): Anaphylaxis       Performance Status:The patient's activity level is no limits with regular activity.      Review of Systems:  a review of eleven systems covering constitutional, Eye, HEENT, Psych, Respiratory, Cardiac, GI, , Musculoskeletal, Endocrine, Dermatologic was negative except for pertinent findings as listed ABOVE and below:    Hearing loss L>R  tinnitis L ear  "      Exam:Comprehensive exam done. /64 (BP Location: Left arm, Patient Position: Sitting, BP Method: Large (Automatic))   Pulse 73   Resp 18   Ht 4' 10" (1.473 m)   Wt 68.3 kg (150 lb 11 oz)   LMP  (LMP Unknown)   SpO2 97% Comment: on room air at rest  BMI 31.49 kg/m²   Exam included Vitals as listed, and patient's appearance and affect and alertness and mood, oral exam for yeast and hygiene and pharynx lesions and Mallapatti (M) score, neck with inspection for jvd and masses and thyroid abnormalities and lymph nodes (supraclavicular and infraclavicular nodes and axillary also examined and noted if abn), chest exam included symmetry and effort and fremitus and percussion and auscultation, cardiac exam included rhythm and gallops and murmur and rubs and jvd and edema, abdominal exam for mass and hepatosplenomegaly and tenderness and hernias and bowel sounds, Musculoskeletal exam with muscle tone and posture and mobility/gait and  strength, and skin for rashes and cyanosis and pallor and turgor, extremity for clubbing.  Findings were normal except for pertinent findings listed below:  M3  Lungs clear  No edema      Radiographs (ct chest and cxr) reviewed: view by direct vision   CXR 5/6/22- no change  CXR 4/2021- elevated L hemidiaphragm   cxr 4/2019 elevated left high dipahragm    Labs none      PFT results reviewed- with no obstruction.  Restriction on lung volume testing      Plan:  Clinical impression is apparently straight forward and impression with management as below. Asthma, allergies, eczema stable on biologic    Jeanette was seen today for follow-up.    Diagnoses and all orders for this visit:    Mild intermittent asthma without complication    Chronic sinus complaints    Hearing loss of left ear, unspecified hearing loss type  -     Ambulatory referral/consult to Audiology; Future        Follow up in about 6 months (around 12/1/2022).    Discussed with patient above for education the " following:      Patient Instructions   Audiology evaluation  Continue asthma regimen  Continue allergy medicines  Continue dupixent shots- per dermatology for eczema

## 2022-08-03 ENCOUNTER — HOSPITAL ENCOUNTER (OUTPATIENT)
Dept: RADIOLOGY | Facility: HOSPITAL | Age: 87
Discharge: HOME | End: 2022-08-03
Attending: FAMILY MEDICINE
Payer: MEDICARE

## 2022-08-03 DIAGNOSIS — R07.81 RIB PAIN ON LEFT SIDE: ICD-10-CM

## 2022-08-03 DIAGNOSIS — R07.81 RIB PAIN ON LEFT SIDE: Primary | ICD-10-CM

## 2022-08-03 PROCEDURE — 71046 X-RAY EXAM CHEST 2 VIEWS: CPT | Mod: TC,PO

## 2022-10-11 RX ORDER — LATANOPROST 50 UG/ML
SOLUTION/ DROPS OPHTHALMIC
Qty: 2.5 ML | Refills: 12 | Status: SHIPPED | OUTPATIENT
Start: 2022-10-11

## 2023-01-10 ENCOUNTER — TELEPHONE (OUTPATIENT)
Dept: OPHTHALMOLOGY | Facility: CLINIC | Age: 88
End: 2023-01-10
Payer: MEDICARE

## 2023-01-10 NOTE — TELEPHONE ENCOUNTER
----- Message from Rosalba Caballero sent at 1/10/2023  2:46 PM CST -----  Consult/Advisory    Name Of Caller:Michelle  Contact Preference?:[phone]               What is the nature of the call?: Pt daughter called requesting her mother yearly appt.[facility] is not allowing me to schedule.

## 2023-02-02 ENCOUNTER — TELEPHONE (OUTPATIENT)
Dept: PAIN MEDICINE | Facility: CLINIC | Age: 88
End: 2023-02-02
Payer: MEDICARE

## 2023-02-02 DIAGNOSIS — M81.0 OSTEOPOROSIS, UNSPECIFIED OSTEOPOROSIS TYPE, UNSPECIFIED PATHOLOGICAL FRACTURE PRESENCE: Primary | ICD-10-CM

## 2023-02-02 NOTE — TELEPHONE ENCOUNTER
----- Message from Ricardo Rosa sent at 2/2/2023 12:36 PM CST -----      Name of Who is Calling:PT/daughter          What is the request in detail:PT/daughter is requesting a call back to discuss an appointment as soon as possible, she stated she thought the PT had an appointment already scheduled but I do not see that appointment in chart. Please be Advised!          [facility]          What Number to Call Back if not in [phone]

## 2023-04-04 ENCOUNTER — HOSPITAL ENCOUNTER (OUTPATIENT)
Dept: RADIOLOGY | Facility: HOSPITAL | Age: 88
Discharge: HOME | End: 2023-04-04
Attending: NURSE PRACTITIONER
Payer: MEDICARE

## 2023-04-04 DIAGNOSIS — M81.0 OSTEOPOROSIS, UNSPECIFIED OSTEOPOROSIS TYPE, UNSPECIFIED PATHOLOGICAL FRACTURE PRESENCE: ICD-10-CM

## 2023-04-04 PROCEDURE — 77080 DXA BONE DENSITY AXIAL: CPT | Mod: TC,PO

## 2023-04-25 DIAGNOSIS — M54.41 LOW BACK PAIN WITH RIGHT-SIDED SCIATICA, UNSPECIFIED BACK PAIN LATERALITY, UNSPECIFIED CHRONICITY: Primary | ICD-10-CM

## 2023-04-28 ENCOUNTER — HOSPITAL ENCOUNTER (OUTPATIENT)
Dept: RADIOLOGY | Facility: HOSPITAL | Age: 88
Discharge: HOME | End: 2023-04-28
Attending: FAMILY MEDICINE
Payer: MEDICARE

## 2023-04-28 DIAGNOSIS — M54.41 LOW BACK PAIN WITH RIGHT-SIDED SCIATICA, UNSPECIFIED BACK PAIN LATERALITY, UNSPECIFIED CHRONICITY: ICD-10-CM

## 2023-04-28 PROCEDURE — 72148 MRI LUMBAR SPINE W/O DYE: CPT | Mod: TC,PO

## 2023-05-08 ENCOUNTER — TELEPHONE (OUTPATIENT)
Dept: PODIATRY | Facility: CLINIC | Age: 88
End: 2023-05-08
Payer: MEDICARE

## 2023-05-08 NOTE — TELEPHONE ENCOUNTER
----- Message from Mary Liao sent at 5/8/2023  3:16 PM CDT -----  Contact: [phone]  Type:  Sooner Appointment Request     Caller is requesting a sooner appointment.  Caller declined first available appointment listed below.  Caller will not accept being placed on the waitlist and is requesting a message be sent to doctor.     Name of Caller: Rosmery (daughter)  When is the first available appointment? 8/23  Reason for Visit: foot pain   Would the patient rather a call back or a response via [facility]? Call back   Best Call Back Number: [phone]  Additional Information:

## 2023-05-18 ENCOUNTER — OFFICE VISIT (OUTPATIENT)
Dept: OPHTHALMOLOGY | Facility: CLINIC | Age: 88
End: 2023-05-18
Payer: MEDICARE

## 2023-05-18 DIAGNOSIS — H11.001 PTERYGIUM, RIGHT: ICD-10-CM

## 2023-05-18 DIAGNOSIS — H43.823 VITREOMACULAR ADHESION, BILATERAL: ICD-10-CM

## 2023-05-18 DIAGNOSIS — H35.3221 EXUDATIVE AGE-RELATED MACULAR DEGENERATION OF LEFT EYE WITH ACTIVE CHOROIDAL NEOVASCULARIZATION: ICD-10-CM

## 2023-05-18 DIAGNOSIS — H35.3212 EXUDATIVE AGE-RELATED MACULAR DEGENERATION OF RIGHT EYE WITH INACTIVE CHOROIDAL NEOVASCULARIZATION: Primary | ICD-10-CM

## 2023-05-18 PROCEDURE — 92201 OPSCPY EXTND RTA DRAW UNI/BI: CPT | Mod: PBBFAC,PO | Performed by: OPHTHALMOLOGY

## 2023-05-18 PROCEDURE — 92134 CPTRZ OPH DX IMG PST SGM RTA: CPT | Mod: PBBFAC,PO | Performed by: OPHTHALMOLOGY

## 2023-05-18 PROCEDURE — 92014 COMPRE OPH EXAM EST PT 1/>: CPT | Mod: ,,, | Performed by: OPHTHALMOLOGY

## 2023-05-18 PROCEDURE — 1101F PR PT FALLS ASSESS DOC 0-1 FALLS W/OUT INJ PAST YR: ICD-10-PCS | Mod: CPTII,,, | Performed by: OPHTHALMOLOGY

## 2023-05-18 PROCEDURE — 99999 PR PBB SHADOW E&M-EST. PATIENT-LVL IV: ICD-10-PCS | Mod: PBBFAC,,, | Performed by: OPHTHALMOLOGY

## 2023-05-18 PROCEDURE — 1160F RVW MEDS BY RX/DR IN RCRD: CPT | Mod: CPTII,,, | Performed by: OPHTHALMOLOGY

## 2023-05-18 PROCEDURE — 1159F MED LIST DOCD IN RCRD: CPT | Mod: CPTII,,, | Performed by: OPHTHALMOLOGY

## 2023-05-18 PROCEDURE — 1126F PR PAIN SEVERITY QUANTIFIED, NO PAIN PRESENT: ICD-10-PCS | Mod: CPTII,,, | Performed by: OPHTHALMOLOGY

## 2023-05-18 PROCEDURE — 1101F PT FALLS ASSESS-DOCD LE1/YR: CPT | Mod: CPTII,,, | Performed by: OPHTHALMOLOGY

## 2023-05-18 PROCEDURE — 2023F PR DILATED RETINAL EXAM W/O EVID OF RETINOPATHY: ICD-10-PCS | Mod: CPTII,,, | Performed by: OPHTHALMOLOGY

## 2023-05-18 PROCEDURE — 92014 PR EYE EXAM, EST PATIENT,COMPREHESV: ICD-10-PCS | Mod: ,,, | Performed by: OPHTHALMOLOGY

## 2023-05-18 PROCEDURE — 3288F FALL RISK ASSESSMENT DOCD: CPT | Mod: CPTII,,, | Performed by: OPHTHALMOLOGY

## 2023-05-18 PROCEDURE — 3288F PR FALLS RISK ASSESSMENT DOCUMENTED: ICD-10-PCS | Mod: CPTII,,, | Performed by: OPHTHALMOLOGY

## 2023-05-18 PROCEDURE — 1126F AMNT PAIN NOTED NONE PRSNT: CPT | Mod: CPTII,,, | Performed by: OPHTHALMOLOGY

## 2023-05-18 PROCEDURE — 2023F DILAT RTA XM W/O RTNOPTHY: CPT | Mod: CPTII,,, | Performed by: OPHTHALMOLOGY

## 2023-05-18 PROCEDURE — 99999 PR PBB SHADOW E&M-EST. PATIENT-LVL IV: CPT | Mod: PBBFAC,,, | Performed by: OPHTHALMOLOGY

## 2023-05-18 PROCEDURE — 99214 OFFICE O/P EST MOD 30 MIN: CPT | Mod: PBBFAC,PO | Performed by: OPHTHALMOLOGY

## 2023-05-18 PROCEDURE — 92134 POSTERIOR SEGMENT OCT RETINA (OCULAR COHERENCE TOMOGRAPHY)-BOTH EYES: ICD-10-PCS | Mod: ,,, | Performed by: OPHTHALMOLOGY

## 2023-05-18 PROCEDURE — 1159F PR MEDICATION LIST DOCUMENTED IN MEDICAL RECORD: ICD-10-PCS | Mod: CPTII,,, | Performed by: OPHTHALMOLOGY

## 2023-05-18 PROCEDURE — 1160F PR REVIEW ALL MEDS BY PRESCRIBER/CLIN PHARMACIST DOCUMENTED: ICD-10-PCS | Mod: CPTII,,, | Performed by: OPHTHALMOLOGY

## 2023-05-18 PROCEDURE — 92201 OPSCPY EXTND RTA DRAW UNI/BI: CPT | Mod: ,,, | Performed by: OPHTHALMOLOGY

## 2023-05-18 PROCEDURE — 92201 PR OPHTHALMOSCOPY, EXT, W/RET DRAW/SCLERAL DEPR, I&R, UNI/BI: ICD-10-PCS | Mod: ,,, | Performed by: OPHTHALMOLOGY

## 2023-05-18 RX ORDER — AZELASTINE 1 MG/ML
SPRAY, METERED NASAL
COMMUNITY
Start: 2022-08-09

## 2023-05-18 RX ORDER — FLUTICASONE PROPIONATE 50 MCG
SPRAY, SUSPENSION (ML) NASAL
COMMUNITY

## 2023-05-18 RX ORDER — BACLOFEN 10 MG/1
TABLET ORAL
COMMUNITY

## 2023-05-18 RX ORDER — TRIAMCINOLONE ACETONIDE 40 MG/ML
60 INJECTION, SUSPENSION INTRA-ARTICULAR; INTRAMUSCULAR
COMMUNITY
Start: 2023-03-03

## 2023-05-18 NOTE — PROGRESS NOTES
"HPI     Macular Degeneration     Additional comments: 1 yr amd chk           Comments    Pt states her va seems "decreased" in OU since her last visit. Her eyes   itch a lot as well.    Wet AMD s/p injections OS  Atypical pterygium OD / DANILO  PCIOL OS    Latanoprost QHS OU           OCT - OD SR fibrosis  OS SR fibrosis and VMT component  No active dx      A/P    1. 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 HTN RET OU    3. NS OD - no Sx because of #1  PCIOL OS    4. Pterygia OD  Increased vascularity with gelatinous appearance - Had eval with Dr. Calero - low risk for neoplasia    5. ?POAG in past  Was on Xalatan  Had stopped  IOP ok , can hold IOP gtts for now.  If IOP increases, can resume down the road        12 months OCT  "

## 2023-06-15 ENCOUNTER — OFFICE VISIT (OUTPATIENT)
Dept: PODIATRY | Facility: CLINIC | Age: 88
End: 2023-06-15
Payer: MEDICARE

## 2023-06-15 VITALS — BODY MASS INDEX: 31.6 KG/M2 | WEIGHT: 150.56 LBS | HEIGHT: 58 IN

## 2023-06-15 DIAGNOSIS — M20.41 HAMMER TOE OF RIGHT FOOT: ICD-10-CM

## 2023-06-15 DIAGNOSIS — M79.674 TOE PAIN, RIGHT: ICD-10-CM

## 2023-06-15 DIAGNOSIS — E11.42 DIABETIC POLYNEUROPATHY ASSOCIATED WITH TYPE 2 DIABETES MELLITUS: Primary | ICD-10-CM

## 2023-06-15 DIAGNOSIS — L84 CORN OR CALLUS: ICD-10-CM

## 2023-06-15 PROCEDURE — 1159F MED LIST DOCD IN RCRD: CPT | Mod: CPTII,S$GLB,, | Performed by: PODIATRIST

## 2023-06-15 PROCEDURE — 99213 OFFICE O/P EST LOW 20 MIN: CPT | Mod: S$GLB,,, | Performed by: PODIATRIST

## 2023-06-15 PROCEDURE — 3288F FALL RISK ASSESSMENT DOCD: CPT | Mod: CPTII,S$GLB,, | Performed by: PODIATRIST

## 2023-06-15 PROCEDURE — 1126F AMNT PAIN NOTED NONE PRSNT: CPT | Mod: CPTII,S$GLB,, | Performed by: PODIATRIST

## 2023-06-15 PROCEDURE — 3072F LOW RISK FOR RETINOPATHY: CPT | Mod: CPTII,S$GLB,, | Performed by: PODIATRIST

## 2023-06-15 PROCEDURE — 1101F PR PT FALLS ASSESS DOC 0-1 FALLS W/OUT INJ PAST YR: ICD-10-PCS | Mod: CPTII,S$GLB,, | Performed by: PODIATRIST

## 2023-06-15 PROCEDURE — 1126F PR PAIN SEVERITY QUANTIFIED, NO PAIN PRESENT: ICD-10-PCS | Mod: CPTII,S$GLB,, | Performed by: PODIATRIST

## 2023-06-15 PROCEDURE — 99999 PR PBB SHADOW E&M-EST. PATIENT-LVL III: ICD-10-PCS | Mod: PBBFAC,,, | Performed by: PODIATRIST

## 2023-06-15 PROCEDURE — 1101F PT FALLS ASSESS-DOCD LE1/YR: CPT | Mod: CPTII,S$GLB,, | Performed by: PODIATRIST

## 2023-06-15 PROCEDURE — 1159F PR MEDICATION LIST DOCUMENTED IN MEDICAL RECORD: ICD-10-PCS | Mod: CPTII,S$GLB,, | Performed by: PODIATRIST

## 2023-06-15 PROCEDURE — 99999 PR PBB SHADOW E&M-EST. PATIENT-LVL III: CPT | Mod: PBBFAC,,, | Performed by: PODIATRIST

## 2023-06-15 PROCEDURE — 3288F PR FALLS RISK ASSESSMENT DOCUMENTED: ICD-10-PCS | Mod: CPTII,S$GLB,, | Performed by: PODIATRIST

## 2023-06-15 PROCEDURE — 99213 PR OFFICE/OUTPT VISIT, EST, LEVL III, 20-29 MIN: ICD-10-PCS | Mod: S$GLB,,, | Performed by: PODIATRIST

## 2023-06-15 PROCEDURE — 3072F PR LOW RISK FOR RETINOPATHY: ICD-10-PCS | Mod: CPTII,S$GLB,, | Performed by: PODIATRIST

## 2023-06-15 NOTE — PROGRESS NOTES
Subjective:      Patient ID: Jeanette Evans is a 88 y.o. female.    Chief Complaint: Callouses (Callouses on right foot , both feet are dry ,itching and nails dealing with fungus) and Nail Problem    Jeanette is a 88 y.o. female who presents to the clinic for evaluation and treatment of diabetic feet. Jeanette has a past medical history of Diabetes mellitus type II, GERD (gastroesophageal reflux disease), and Hypertension. Patient presents to clinic with the chief complaint of a painful corn of the Rt. 5th toe.  Describes pain from the lesion as sharp and rates pain as a 4/10.  Symptoms are aggravated with wearing specific shoe gear and alleviated upon doffing of shoes.  Notes pain symptoms are linked to a heavy callus she has developed to the dorsal lateral aspect of the digit.  She has not attempted to self treat.  Also, notes dry skin to bilateral foot.  States the skin itches with minimal improvement with application of lotion.  Inquires as to recommendations regarding moisturizer.  Denies any additional pedal complaints.      PCP: Mervin Killian MD    Date Last Seen by PCP: 4/23    Hemoglobin A1C   Date Value Ref Range Status   05/06/2022 6.5 (H) 4.5 - 6.2 % Final     Comment:     According to ADA guidelines, hemoglobin A1C <7.0% represents  optimal control in non-pregnant diabetic patients.  Different  metrics may apply to specific populations.   Standards of Medical Care in Diabetes - 2016.    For the purpose of screening for the presence of diabetes:  <5.7%     Consistent with the absence of diabetes  5.7-6.4%  Consistent with increasing risk for diabetes   (prediabetes)  >or=6.5%  Consistent with diabetes    Currently no consensus exists for use of hemoglobin A1C  for diagnosis of diabetes for children.     04/06/2017 5.9 4.5 - 6.2 % Final     Comment:     According to ADA guidelines, hemoglobin A1C <7.0% represents  optimal control in non-pregnant diabetic patients.  Different  metrics may apply to specific  populations.   Standards of Medical Care in Diabetes - 2016.  For the purpose of screening for the presence of diabetes:  <5.7%     Consistent with the absence of diabetes  5.7-6.4%  Consistent with increasing risk for diabetes   (prediabetes)  >or=6.5%  Consistent with diabetes  Currently no consensus exists for use of hemoglobin A1C  for diagnosis of diabetes for children.             Past Medical History:   Diagnosis Date    Diabetes mellitus type II     GERD (gastroesophageal reflux disease)     Hypertension        Past Surgical History:   Procedure Laterality Date    CATARACT EXTRACTION      CHOLECYSTECTOMY         Family History   Problem Relation Age of Onset    Cancer Mother     Heart disease Father     Amblyopia Neg Hx     Blindness Neg Hx     Cataracts Neg Hx     Glaucoma Neg Hx     Macular degeneration Neg Hx     Retinal detachment Neg Hx     Strabismus Neg Hx        Social History     Socioeconomic History    Marital status:    Tobacco Use    Smoking status: Never    Smokeless tobacco: Never   Substance and Sexual Activity    Alcohol use: No       Current Outpatient Medications   Medication Sig Dispense Refill    albuterol (PROVENTIL/VENTOLIN HFA) 90 mcg/actuation inhaler 2 puffs every 4 hours as needed for cough, wheeze, or shortness of breath 18 g 11    albuterol-ipratropium 2.5mg-0.5mg/3mL (DUO-NEB) 0.5 mg-3 mg(2.5 mg base)/3 mL nebulizer solution       alendronate (FOSAMAX) 70 MG tablet TAKE 1 TABLET BY MOUTH EVERY WEEK      atorvastatin (LIPITOR) 10 MG tablet Take 10 mg by mouth.      azelastine (ASTELIN) 137 mcg (0.1 %) nasal spray 1 puff in each nostril Nasally Twice a day      baclofen (LIORESAL) 10 MG tablet 1 tablet with food or milk Orally Three times a day      cloNIDine (CATAPRES) 0.1 MG tablet TK 1 T PO QD  PRF HIGH BP      DUPIXENT  mg/2 mL PnIj Inject into the skin.      esomeprazole (NEXIUM) 40 MG capsule TK 1 C PO QD      fluticasone furoate-vilanteroL (BREO ELLIPTA)  200-25 mcg/dose DsDv diskus inhaler Inhale 1 puff into the lungs once daily. Controller 1 each 11    fluticasone propionate (FLONASE) 50 mcg/actuation nasal spray 1 spray in each nostril Nasally Once a day      latanoprost 0.005 % ophthalmic solution INSTILL 1 DROP IN BOTH EYES EVERY EVENING 2.5 mL 12    losartan (COZAAR) 50 MG tablet TK 1 T PO BID      magnesium oxide (MAG-OX) 400 mg (241.3 mg magnesium) tablet 1 po q hs      metoprolol tartrate (LOPRESSOR) 25 MG tablet TK 1 T PO BID      montelukast (SINGULAIR) 10 mg tablet Take 1 tablet (10 mg total) by mouth every evening. 30 tablet 11    triamcinolone acetonide (KENALOG-40) 40 mg/mL injection Inject 60 mg into the articular space.      aspirin (ECOTRIN) 81 MG EC tablet Take 1 tablet (81 mg total) by mouth once daily.  0    meclizine (ANTIVERT) 25 mg tablet Take 1 tablet (25 mg total) by mouth 2 (two) times a day. for 10 doses 10 tablet 0     No current facility-administered medications for this visit.       Review of patient's allergies indicates:   Allergen Reactions    Pcn [penicillins] Anaphylaxis     Other reaction(s): Anaphylaxis         Review of Systems   Constitutional: Negative for chills and fever.   Cardiovascular:  Negative for claudication and leg swelling.   Skin:  Positive for color change, dry skin, nail changes and suspicious lesions.   Musculoskeletal:  Negative for joint pain, joint swelling, muscle cramps, muscle weakness and myalgias.   Gastrointestinal:  Negative for nausea and vomiting.   Neurological:  Positive for paresthesias. Negative for numbness.   Psychiatric/Behavioral:  Negative for altered mental status.          Objective:      Physical Exam  Constitutional:       Appearance: Normal appearance. She is not ill-appearing.   Cardiovascular:      Pulses:           Dorsalis pedis pulses are 2+ on the right side and 2+ on the left side.        Posterior tibial pulses are 2+ on the right side and 2+ on the left side.      Comments:  CFT is < 3 seconds bilateral.  Pedal hair growth is decreased bilateral.  Varicosities noted bilateral.  No lower extremity edema noted bilateral.  Toes are cool to touch bilateral.    Musculoskeletal:         General: Tenderness and deformity present. No signs of injury.      Right lower leg: No edema.      Left lower leg: No edema.      Comments: Muscle strength 5/5 in all muscle groups bilateral.  No tenderness nor crepitation with ROM of foot/ankle joints bilateral.  Pain with palpation to the callus overlying the Rt. Dorsal lateral 5th PIP joint.  Bilateral pes cavus foot type. Bilateral semi-reducible contracture of toes 2-5 with adductovarus rotation of the Rt. 5th.     Skin:     General: Skin is warm and dry.      Capillary Refill: Capillary refill takes 2 to 3 seconds.      Findings: Lesion present. No bruising, ecchymosis, erythema, petechiae, rash or wound.      Comments: Pedal skin has normal turgor, temperature, and texture bilateral.  Toenails x 10 appear mycotic but well maintained.  Pedal skin appear dry and scaly bilateral.  Hyperkeratotic lesion noted to the dorsal lateral aspect of the Rt. 5th PIP joint.     Neurological:      General: No focal deficit present.      Mental Status: She is alert.      Sensory: No sensory deficit.      Motor: Motor function is intact.      Coordination: Coordination is intact.      Comments: Protective sensation per Spelter-Aida monofilament is intact bilateral. Light touch is intact bilateral.             Assessment:       Encounter Diagnoses   Name Primary?    Diabetic polyneuropathy associated with type 2 diabetes mellitus Yes    Toe pain, right     Hammer toe of right foot     Corn or callus            Plan:       Jeanette was seen today for callouses and nail problem.    Diagnoses and all orders for this visit:    Diabetic polyneuropathy associated with type 2 diabetes mellitus    Toe pain, right    Hammer toe of right foot    Corn or callus        I counseled  the patient on her conditions, their implications and medical management.    Advised to wear shoe gear that limits pressure to the hammertoe of the Rt. Foot.    Advised to begin applying ebanel lotion and an occlusive dressing to the corn of the Rt. 5th toe daily x 3 weeks.  May also regularly apply to improve dryness of pedal skin.    Shoe inspection. Diabetic Foot Education. Patient reminded of the importance of good nutrition and blood sugar control to help prevent podiatric complications of diabetes. Patient instructed on proper foot hygeine. We discussed wearing proper shoe gear, daily foot inspections, never walking without protective shoe gear, never putting sharp instruments to feet    Patient instructed to inspect her feet, wear protective shoe gear when ambulatory, moisturizer to maintain skin integrity and follow in this office in approximately 12 months, sooner ptejas.    Chirag Le DPM

## 2023-08-29 DIAGNOSIS — R41.82 MENTAL STATUS ALTERATION: Primary | ICD-10-CM

## 2023-09-13 ENCOUNTER — HOSPITAL ENCOUNTER (OUTPATIENT)
Dept: RADIOLOGY | Facility: HOSPITAL | Age: 88
Discharge: HOME | End: 2023-09-13
Attending: FAMILY MEDICINE
Payer: MEDICARE

## 2023-09-13 DIAGNOSIS — R41.82 MENTAL STATUS ALTERATION: ICD-10-CM

## 2023-09-13 LAB
CREAT SERPL-MCNC: 1.2 MG/DL (ref 0.5–1.4)
SAMPLE: NORMAL

## 2023-09-13 PROCEDURE — 25500020 PHARM REV CODE 255: Mod: PO | Performed by: FAMILY MEDICINE

## 2023-09-13 PROCEDURE — A9585 GADOBUTROL INJECTION: HCPCS | Mod: PO | Performed by: FAMILY MEDICINE

## 2023-09-13 PROCEDURE — 82565 ASSAY OF CREATININE: CPT | Mod: PO

## 2023-09-13 PROCEDURE — 70553 MRI BRAIN STEM W/O & W/DYE: CPT | Mod: TC,PO

## 2023-09-13 RX ORDER — GADOBUTROL 604.72 MG/ML
6.5 INJECTION INTRAVENOUS
Status: COMPLETED | OUTPATIENT
Start: 2023-09-13 | End: 2023-09-13

## 2023-09-13 RX ADMIN — GADOBUTROL 6.5 ML: 604.72 INJECTION INTRAVENOUS at 10:09

## 2023-11-02 DIAGNOSIS — R05.9 COUGH: Primary | ICD-10-CM

## 2023-11-13 ENCOUNTER — HOSPITAL ENCOUNTER (OUTPATIENT)
Dept: RADIOLOGY | Facility: HOSPITAL | Age: 88
Discharge: HOME | End: 2023-11-13
Attending: FAMILY MEDICINE
Payer: MEDICARE

## 2023-11-13 DIAGNOSIS — R05.9 COUGH: ICD-10-CM

## 2023-11-13 PROCEDURE — 71046 X-RAY EXAM CHEST 2 VIEWS: CPT | Mod: TC,PO

## 2023-12-25 ENCOUNTER — HOSPITAL ENCOUNTER (EMERGENCY)
Facility: HOSPITAL | Age: 88
Discharge: HOME | End: 2023-12-26
Attending: EMERGENCY MEDICINE
Payer: MEDICARE

## 2023-12-25 VITALS
WEIGHT: 156 LBS | HEIGHT: 58 IN | TEMPERATURE: 98 F | DIASTOLIC BLOOD PRESSURE: 67 MMHG | SYSTOLIC BLOOD PRESSURE: 149 MMHG | RESPIRATION RATE: 18 BRPM | BODY MASS INDEX: 32.75 KG/M2 | OXYGEN SATURATION: 96 % | HEART RATE: 76 BPM

## 2023-12-25 DIAGNOSIS — S22.42XA CLOSED FRACTURE OF MULTIPLE RIBS OF LEFT SIDE, INITIAL ENCOUNTER: Primary | ICD-10-CM
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
## 2023-12-25 PROCEDURE — 25000003 PHARM REV CODE 250: Performed by: EMERGENCY MEDICINE

## 2023-12-25 PROCEDURE — 99284 EMERGENCY DEPT VISIT MOD MDM: CPT | Mod: 25

## 2023-12-25 RX ORDER — HYDROCODONE BITARTRATE AND ACETAMINOPHEN 10; 325 MG/1; MG/1
1 TABLET ORAL EVERY 6 HOURS PRN
Qty: 12 TABLET | Refills: 0 | Status: SHIPPED | OUTPATIENT
Start: 2023-12-25 | End: 2023-12-28

## 2023-12-25 RX ORDER — HYDROCODONE BITARTRATE AND ACETAMINOPHEN 5; 325 MG/1; MG/1
1 TABLET ORAL
Status: COMPLETED | OUTPATIENT
Start: 2023-12-25 | End: 2023-12-25

## 2023-12-25 RX ADMIN — HYDROCODONE BITARTRATE AND ACETAMINOPHEN 1 TABLET: 5; 325 TABLET ORAL at 10:12

## 2023-12-25 RX ADMIN — HYDROCODONE BITARTRATE AND ACETAMINOPHEN 1 TABLET: 5; 325 TABLET ORAL at 07:12

## 2023-12-26 NOTE — ED NOTES
Pt updated on plan of care. Pt understands that we are just waiting on official reads. Pt states she can feel the pain coming back. Md notified of pain and pain medicine ordered

## 2023-12-26 NOTE — ED PROVIDER NOTES
Encounter Date: 12/25/2023       History     Chief Complaint   Patient presents with    Fall     Chief complaint is left mid back pain.  The patient fell yesterday in [place].  Her  was in the room she was walking fell backwards struck her back on a piece of furniture.  She only has pain to the left midback 3 in from midline on the left.  She is able to walk but after returning home they decided to have her seen for her fall and pain.  No complaints of head pain neck pain shoulder pain leg pain.  No history of head trauma.  No history of loss of consciousness.        Review of patient's allergies indicates:   Allergen Reactions    Pcn [penicillins] Anaphylaxis     Other reaction(s): Anaphylaxis     Past Medical History:   Diagnosis Date    Diabetes mellitus type II     GERD (gastroesophageal reflux disease)     Hypertension      Past Surgical History:   Procedure Laterality Date    CATARACT EXTRACTION      CHOLECYSTECTOMY       Family History   Problem Relation Age of Onset    Cancer Mother     Heart disease Father     Amblyopia Neg Hx     Blindness Neg Hx     Cataracts Neg Hx     Glaucoma Neg Hx     Macular degeneration Neg Hx     Retinal detachment Neg Hx     Strabismus Neg Hx      Social History     Tobacco Use    Smoking status: Never    Smokeless tobacco: Never   Substance Use Topics    Alcohol use: No     Review of Systems   Constitutional:  Negative for chills and fever.   HENT:  Negative for ear pain, rhinorrhea and sore throat.    Eyes:  Negative for pain and visual disturbance.   Respiratory:  Negative for cough and shortness of breath.    Cardiovascular:  Negative for chest pain and palpitations.   Gastrointestinal:  Negative for abdominal pain, constipation, diarrhea, nausea and vomiting.   Genitourinary:  Negative for dysuria, frequency, hematuria and urgency.   Musculoskeletal:  Negative for back pain, joint swelling and myalgias.        Midback pain   Skin:  Negative for rash.   Neurological:   Negative for dizziness, seizures, weakness and headaches.   Psychiatric/Behavioral:  Negative for dysphoric mood. The patient is not nervous/anxious.        Physical Exam     Initial Vitals [12/25/23 1637]   BP Pulse Resp Temp SpO2   (!) 176/70 74 18 97.8 °F (36.6 °C) 96 %      MAP       --         Physical Exam    Nursing note and vitals reviewed.  Constitutional: She appears well-developed and well-nourished.   HENT:   Head: Normocephalic and atraumatic.   Eyes: Conjunctivae, EOM and lids are normal. Pupils are equal, round, and reactive to light.   Neck: Trachea normal. Neck supple. No thyroid mass and no thyromegaly present.   Normal range of motion.  Cardiovascular:  Normal rate, regular rhythm and normal heart sounds.           Pulmonary/Chest: Effort normal and breath sounds normal.   Abdominal: Abdomen is soft. There is no abdominal tenderness.   Musculoskeletal:         General: Normal range of motion.      Cervical back: Normal range of motion and neck supple.      Comments: Patient with pain to palpation approximately 3 in from midline proximally T8-T10 on the left.  Slight bruising noted.  No crepitus palpated.  Absolutely no midline T-spine tenderness or LS-spine tenderness.  Patient stood up undressed in front of the family with no bruising to the buttock area hips chest arms shoulders.  No neck pain.  No complaints of headache.     Neurological: She is alert and oriented to person, place, and time. She has normal strength and normal reflexes. No cranial nerve deficit or sensory deficit.   Skin: Skin is warm and dry.   Psychiatric: She has a normal mood and affect. Her speech is normal and behavior is normal. Judgment and thought content normal.         ED Course   Procedures  Labs Reviewed - No data to display       Imaging Results              CT Chest Without Contrast (Final result)  Result time 12/25/23 22:41:32      Final result by Tegan Robin MD (12/25/23 22:41:32)                   Narrative:     EXAM:  CT Chest Without Intravenous Contrast    CLINICAL HISTORY:  The patient is 89 years old and is Female; Rule out broken ribs    TECHNIQUE:  Axial computed tomography images of the chest without intravenous contrast.  Sagittal and coronal reformatted images were created and reviewed.  This CT exam was performed using one or more of the following dose reduction techniques:  automated exposure control, adjustment of the mA and/or kV according to patient size, and/or use of iterative reconstruction technique.    COMPARISON:  Chest and rib radiographs from 12/25/2023, chest CT from 1/14/2016    FINDINGS:  LUNGS:  There are areas of subsegmental atelectasis and/or scarring in the lower lungs bilaterally. No lung mass visualized.  PLEURAL SPACE:  Small bilateral pleural effusions.  No pneumothorax.  HEART:  Coronary artery calcifications. No cardiomegaly. No pericardial effusion.  BONES/JOINTS:  There are acute, nondisplaced fractures of the left posterior ninth through 11th ribs.  There are degenerative changes of the spine. No additional acute fractures visualized. No dislocation.  SOFT TISSUES:  No significant abnormalities visualized in the superficial soft tissues.  VASCULATURE:  Atherosclerotic calcifications are noted. No aortic aneurysm.  LYMPH NODES:  No significant lymph node enlargement.  KIDNEYS AND URETERS:  Small right renal cyst. No follow-up imaging is recommended.  UPPER ABDOMEN:  Mild elevation of the left hemidiaphragm.    IMPRESSION:  1. Acute, nondisplaced fractures of the left posterior ninth through 11th ribs. No pneumothorax.  2.  Small bilateral pleural effusions.    Electronically signed by:  Tegan Robin MD  12/25/2023 10:41 PM CST Workstation: [id number]                                     X-Ray Lumbar Spine 5 View (In process)                      XR Ribs Min 3 views w/PA Chest Left (In process)  Result time 12/25/23 20:25:37   Procedure changed from X-Ray Ribs 2 View Left                     Medications   HYDROcodone-acetaminophen 5-325 mg per tablet 1 tablet (1 tablet Oral Given 12/25/23 1937)   HYDROcodone-acetaminophen 5-325 mg per tablet 1 tablet (1 tablet Oral Given 12/25/23 2246)     Medical Decision Making  The patient has 3 broken ribs 9, 10 and 11 with no pneumothorax with small bilateral pleural effusions.  She will be discharged with instructions and given pain medicines as well.    Amount and/or Complexity of Data Reviewed  Radiology: ordered.    Risk  Prescription drug management.                                      Clinical Impression:  Final diagnoses:  [W19.XXXA] Fall  [S22.42XA] Closed fracture of multiple ribs of left side, initial encounter (Primary)          ED Disposition Condition    Discharge Stable          ED Prescriptions       Medication Sig Dispense Start Date End Date Auth. Provider    HYDROcodone-acetaminophen (NORCO)  mg per tablet Take 1 tablet by mouth every 6 (six) hours as needed. 12 tablet 12/25/2023 12/28/2023 Pablo Joe MD          Follow-up Information    None          Pablo Joe MD  12/25/23 8583

## 2024-01-05 ENCOUNTER — TELEPHONE (OUTPATIENT)
Dept: OPHTHALMOLOGY | Facility: CLINIC | Age: 89
End: 2024-01-05
Payer: MEDICARE

## 2024-01-05 NOTE — TELEPHONE ENCOUNTER
Spoke with pts daughter, c/o pain OS. Asked if any vision changes, no changes reported. Advised to use artificial tears over the weekend 4-6x and if no better will schedule [facility] visit.

## 2024-01-05 NOTE — TELEPHONE ENCOUNTER
----- Message from Suma Mcclellan sent at 1/5/2024  2:58 PM CST -----  Regarding: macular degeneration appt  Type:  Sooner Apoointment Request    Caller is requesting a sooner appointment.  Caller declined first available appointment listed below.  Caller will not accept being placed on the waitlist and is requesting a message be sent to doctor.    Name of Caller:Pt daughter    When is the first available appointment?none    Symptoms:pain in left eye    Would the patient rather a call back or a response via [facility]? Call back    Best Call Back Number:[phone]    Additional Information: Pt have macular degeneration and would like a call back for an apt because pt is in pain. Thank you

## 2024-01-12 ENCOUNTER — HOSPITAL ENCOUNTER (OUTPATIENT)
Dept: RADIOLOGY | Facility: HOSPITAL | Age: 89
Discharge: HOME | End: 2024-01-12
Attending: NURSE PRACTITIONER
Payer: MEDICARE

## 2024-01-12 DIAGNOSIS — M79.671 RIGHT FOOT PAIN: ICD-10-CM

## 2024-01-12 DIAGNOSIS — M79.671 RIGHT FOOT PAIN: Primary | ICD-10-CM

## 2024-01-12 PROCEDURE — 73620 X-RAY EXAM OF FOOT: CPT | Mod: TC,PO,RT

## 2024-01-17 DIAGNOSIS — I73.9 PERIPHERAL VASCULAR DISEASE, UNSPECIFIED: Primary | ICD-10-CM

## 2024-01-20 ENCOUNTER — HOSPITAL ENCOUNTER (OUTPATIENT)
Dept: RADIOLOGY | Facility: HOSPITAL | Age: 89
Discharge: HOME | End: 2024-01-20
Attending: NURSE PRACTITIONER
Payer: MEDICARE

## 2024-01-20 DIAGNOSIS — I73.9 PERIPHERAL VASCULAR DISEASE, UNSPECIFIED: ICD-10-CM

## 2024-01-20 PROCEDURE — 93922 UPR/L XTREMITY ART 2 LEVELS: CPT | Mod: TC,52

## 2024-01-20 PROCEDURE — 93971 EXTREMITY STUDY: CPT | Mod: TC,RT

## 2024-01-20 PROCEDURE — 93922 UPR/L XTREMITY ART 2 LEVELS: CPT | Mod: 26,52,, | Performed by: RADIOLOGY

## 2024-01-20 PROCEDURE — 93926 LOWER EXTREMITY STUDY: CPT | Mod: 26,RT,, | Performed by: RADIOLOGY

## 2024-01-20 PROCEDURE — 93971 EXTREMITY STUDY: CPT | Mod: 26,RT,, | Performed by: RADIOLOGY

## 2024-03-11 ENCOUNTER — OFFICE VISIT (OUTPATIENT)
Dept: OPHTHALMOLOGY | Facility: CLINIC | Age: 89
End: 2024-03-11
Payer: MEDICARE

## 2024-03-11 DIAGNOSIS — H35.3212 EXUDATIVE AGE-RELATED MACULAR DEGENERATION OF RIGHT EYE WITH INACTIVE CHOROIDAL NEOVASCULARIZATION: Primary | ICD-10-CM

## 2024-03-11 DIAGNOSIS — H35.3221 EXUDATIVE AGE-RELATED MACULAR DEGENERATION OF LEFT EYE WITH ACTIVE CHOROIDAL NEOVASCULARIZATION: ICD-10-CM

## 2024-03-11 DIAGNOSIS — H43.823 VITREOMACULAR ADHESION, BILATERAL: ICD-10-CM

## 2024-03-11 DIAGNOSIS — H35.033 HYPERTENSIVE RETINOPATHY, BILATERAL: ICD-10-CM

## 2024-03-11 PROCEDURE — 92014 COMPRE OPH EXAM EST PT 1/>: CPT | Mod: S$GLB,,, | Performed by: OPHTHALMOLOGY

## 2024-03-11 PROCEDURE — 99999 PR PBB SHADOW E&M-EST. PATIENT-LVL IV: CPT | Mod: PBBFAC,,, | Performed by: OPHTHALMOLOGY

## 2024-03-11 PROCEDURE — 92134 CPTRZ OPH DX IMG PST SGM RTA: CPT | Mod: S$GLB,,, | Performed by: OPHTHALMOLOGY

## 2024-03-11 PROCEDURE — 92201 OPSCPY EXTND RTA DRAW UNI/BI: CPT | Mod: S$GLB,,, | Performed by: OPHTHALMOLOGY

## 2024-03-11 NOTE — PROGRESS NOTES
"HPI     Macular Degeneration     Additional comments: 1 YR AMD CHK  FB sensation OS x's 2 months           Comments    Pt states her va seems "decreased" in OU since her last visit.  She has been having FB sensation in OS for several months.    Daughter-     Wet AMD s/p injections OS  Atypical pterygium OD / DANILO  PCIOL OS    Latanoprost qhs OU- every other day           OCT - OD SR fibrosis  OS SR fibrosis and VMT component  No active dx      A/P    1. 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 HTN RET OU    3. NS OD - no Sx because of #1  PCIOL OS    4. Pterygia OD  Increased vascularity with gelatinous appearance - Had eval with Dr. Calero - low risk for neoplasia    5. ?POAG in past  Was on Xalatan  Had stopped  IOP ok , can hold IOP gtts for now.  If IOP increases, can resume down the road        12 months OCT  "

## 2024-04-18 DIAGNOSIS — R11.2 NAUSEA AND VOMITING, UNSPECIFIED VOMITING TYPE: ICD-10-CM

## 2024-04-18 DIAGNOSIS — J44.9 CHRONIC OBSTRUCTIVE PULMONARY DISEASE, UNSPECIFIED COPD TYPE: ICD-10-CM

## 2024-04-18 DIAGNOSIS — R07.89 ATYPICAL CHEST PAIN: Primary | ICD-10-CM

## 2024-04-18 DIAGNOSIS — R10.32 LEFT LOWER QUADRANT PAIN: ICD-10-CM

## 2024-04-19 ENCOUNTER — HOSPITAL ENCOUNTER (OUTPATIENT)
Dept: RADIOLOGY | Facility: HOSPITAL | Age: 89
Discharge: HOME | End: 2024-04-19
Attending: NURSE PRACTITIONER
Payer: MEDICARE

## 2024-04-19 DIAGNOSIS — J44.9 CHRONIC OBSTRUCTIVE PULMONARY DISEASE, UNSPECIFIED COPD TYPE: ICD-10-CM

## 2024-04-19 DIAGNOSIS — R11.2 NAUSEA AND VOMITING, UNSPECIFIED VOMITING TYPE: ICD-10-CM

## 2024-04-19 DIAGNOSIS — R10.32 LEFT LOWER QUADRANT PAIN: ICD-10-CM

## 2024-04-19 DIAGNOSIS — R07.89 ATYPICAL CHEST PAIN: ICD-10-CM

## 2024-04-19 PROCEDURE — 71046 X-RAY EXAM CHEST 2 VIEWS: CPT | Mod: TC,PO

## 2024-04-19 PROCEDURE — 74176 CT ABD & PELVIS W/O CONTRAST: CPT | Mod: TC,PO

## 2024-04-20 ENCOUNTER — LAB VISIT (OUTPATIENT)
Dept: LAB | Facility: HOSPITAL | Age: 89
End: 2024-04-20
Attending: NURSE PRACTITIONER
Payer: MEDICARE

## 2024-04-20 DIAGNOSIS — J44.9 VANISHING LUNG: ICD-10-CM

## 2024-04-20 DIAGNOSIS — E11.69 DIABETES MELLITUS ASSOCIATED WITH HORMONAL ETIOLOGY: ICD-10-CM

## 2024-04-20 DIAGNOSIS — R07.89 OTHER CHEST PAIN: ICD-10-CM

## 2024-04-20 DIAGNOSIS — I12.9 PARENCHYMAL RENAL HYPERTENSION: ICD-10-CM

## 2024-04-20 DIAGNOSIS — Z13.29 SCREENING FOR THYROID DISORDER: Primary | ICD-10-CM

## 2024-04-20 LAB
ALBUMIN SERPL BCP-MCNC: 3.9 G/DL (ref 3.5–5.2)
ALP SERPL-CCNC: 72 U/L (ref 55–135)
ALT SERPL W/O P-5'-P-CCNC: 12 U/L (ref 10–44)
ANION GAP SERPL CALC-SCNC: 5 MMOL/L (ref 8–16)
AST SERPL-CCNC: 21 U/L (ref 10–40)
BASOPHILS # BLD AUTO: 0.05 K/UL (ref 0–0.2)
BASOPHILS NFR BLD: 0.6 % (ref 0–1.9)
BILIRUB SERPL-MCNC: 0.3 MG/DL (ref 0.1–1)
BUN SERPL-MCNC: 24 MG/DL (ref 8–23)
CALCIUM SERPL-MCNC: 9.5 MG/DL (ref 8.7–10.5)
CHLORIDE SERPL-SCNC: 107 MMOL/L (ref 95–110)
CO2 SERPL-SCNC: 28 MMOL/L (ref 23–29)
CREAT SERPL-MCNC: 1.4 MG/DL (ref 0.5–1.4)
DIFFERENTIAL METHOD BLD: ABNORMAL
EOSINOPHIL # BLD AUTO: 0.5 K/UL (ref 0–0.5)
EOSINOPHIL NFR BLD: 5.5 % (ref 0–8)
ERYTHROCYTE [DISTWIDTH] IN BLOOD BY AUTOMATED COUNT: 14.4 % (ref 11.5–14.5)
EST. GFR  (NO RACE VARIABLE): 36 ML/MIN/1.73 M^2
GLUCOSE SERPL-MCNC: 112 MG/DL (ref 70–110)
HCT VFR BLD AUTO: 34.6 % (ref 37–48.5)
HGB BLD-MCNC: 11.1 G/DL (ref 12–16)
IMM GRANULOCYTES # BLD AUTO: 0.04 K/UL (ref 0–0.04)
IMM GRANULOCYTES NFR BLD AUTO: 0.5 % (ref 0–0.5)
LYMPHOCYTES # BLD AUTO: 2.9 K/UL (ref 1–4.8)
LYMPHOCYTES NFR BLD: 34.3 % (ref 18–48)
MCH RBC QN AUTO: 24.4 PG (ref 27–31)
MCHC RBC AUTO-ENTMCNC: 32.1 G/DL (ref 32–36)
MCV RBC AUTO: 76 FL (ref 82–98)
MONOCYTES # BLD AUTO: 0.8 K/UL (ref 0.3–1)
MONOCYTES NFR BLD: 9.3 % (ref 4–15)
NEUTROPHILS # BLD AUTO: 4.2 K/UL (ref 1.8–7.7)
NEUTROPHILS NFR BLD: 49.8 % (ref 38–73)
NRBC BLD-RTO: 0 /100 WBC
PLATELET # BLD AUTO: 311 K/UL (ref 150–450)
PMV BLD AUTO: 9.9 FL (ref 9.2–12.9)
POTASSIUM SERPL-SCNC: 5.4 MMOL/L (ref 3.5–5.1)
PROT SERPL-MCNC: 6.8 G/DL (ref 6–8.4)
RBC # BLD AUTO: 4.54 M/UL (ref 4–5.4)
SODIUM SERPL-SCNC: 140 MMOL/L (ref 136–145)
TSH SERPL DL<=0.005 MIU/L-ACNC: 2.71 UIU/ML (ref 0.34–5.6)
WBC # BLD AUTO: 8.32 K/UL (ref 3.9–12.7)

## 2024-04-20 PROCEDURE — 84443 ASSAY THYROID STIM HORMONE: CPT | Performed by: NURSE PRACTITIONER

## 2024-04-20 PROCEDURE — 85025 COMPLETE CBC W/AUTO DIFF WBC: CPT | Performed by: NURSE PRACTITIONER

## 2024-04-20 PROCEDURE — 80053 COMPREHEN METABOLIC PANEL: CPT | Performed by: NURSE PRACTITIONER

## 2024-04-20 PROCEDURE — 36415 COLL VENOUS BLD VENIPUNCTURE: CPT | Performed by: NURSE PRACTITIONER

## 2024-12-21 ENCOUNTER — HOSPITAL ENCOUNTER (EMERGENCY)
Facility: HOSPITAL | Age: 89
Discharge: HOME | End: 2024-12-21
Attending: EMERGENCY MEDICINE
Payer: MEDICARE

## 2024-12-21 VITALS
RESPIRATION RATE: 18 BRPM | OXYGEN SATURATION: 96 % | BODY MASS INDEX: 29.81 KG/M2 | DIASTOLIC BLOOD PRESSURE: 67 MMHG | TEMPERATURE: 99 F | WEIGHT: 142 LBS | HEART RATE: 98 BPM | HEIGHT: 58 IN | SYSTOLIC BLOOD PRESSURE: 158 MMHG

## 2024-12-21 DIAGNOSIS — R10.13 EPIGASTRIC PAIN: ICD-10-CM

## 2024-12-21 DIAGNOSIS — K52.9 ENTERITIS: Primary | ICD-10-CM

## 2024-12-21 LAB
ALBUMIN SERPL BCP-MCNC: 4.5 G/DL (ref 3.5–5.2)
ALP SERPL-CCNC: 77 U/L (ref 55–135)
ALT SERPL W/O P-5'-P-CCNC: 28 U/L (ref 10–44)
ANION GAP SERPL CALC-SCNC: 8 MMOL/L (ref 8–16)
AST SERPL-CCNC: 35 U/L (ref 10–40)
BASOPHILS # BLD AUTO: 0.02 K/UL (ref 0–0.2)
BASOPHILS NFR BLD: 0.2 % (ref 0–1.9)
BILIRUB SERPL-MCNC: 0.5 MG/DL (ref 0.1–1)
BILIRUB UR QL STRIP: NEGATIVE
BUN SERPL-MCNC: 17 MG/DL (ref 8–23)
CALCIUM SERPL-MCNC: 9.3 MG/DL (ref 8.7–10.5)
CHLORIDE SERPL-SCNC: 107 MMOL/L (ref 95–110)
CLARITY UR: CLEAR
CO2 SERPL-SCNC: 26 MMOL/L (ref 23–29)
COLOR UR: YELLOW
CREAT SERPL-MCNC: 1.2 MG/DL (ref 0.5–1.4)
DIFFERENTIAL METHOD BLD: ABNORMAL
EOSINOPHIL # BLD AUTO: 0.1 K/UL (ref 0–0.5)
EOSINOPHIL NFR BLD: 0.5 % (ref 0–8)
ERYTHROCYTE [DISTWIDTH] IN BLOOD BY AUTOMATED COUNT: 15.8 % (ref 11.5–14.5)
EST. GFR  (NO RACE VARIABLE): 43 ML/MIN/1.73 M^2
GLUCOSE SERPL-MCNC: 153 MG/DL (ref 70–110)
GLUCOSE UR QL STRIP: NEGATIVE
HCT VFR BLD AUTO: 40.5 % (ref 37–48.5)
HGB BLD-MCNC: 13.4 G/DL (ref 12–16)
HGB UR QL STRIP: NEGATIVE
IMM GRANULOCYTES # BLD AUTO: 0.05 K/UL (ref 0–0.04)
IMM GRANULOCYTES NFR BLD AUTO: 0.5 % (ref 0–0.5)
INFLUENZA A, MOLECULAR: NEGATIVE
INFLUENZA B, MOLECULAR: NEGATIVE
KETONES UR QL STRIP: NEGATIVE
LEUKOCYTE ESTERASE UR QL STRIP: NEGATIVE
LIPASE SERPL-CCNC: 28 U/L (ref 4–60)
LYMPHOCYTES # BLD AUTO: 0.6 K/UL (ref 1–4.8)
LYMPHOCYTES NFR BLD: 5.5 % (ref 18–48)
MCH RBC QN AUTO: 24.6 PG (ref 27–31)
MCHC RBC AUTO-ENTMCNC: 33.1 G/DL (ref 32–36)
MCV RBC AUTO: 74 FL (ref 82–98)
MONOCYTES # BLD AUTO: 0.8 K/UL (ref 0.3–1)
MONOCYTES NFR BLD: 7 % (ref 4–15)
NEUTROPHILS # BLD AUTO: 9.2 K/UL (ref 1.8–7.7)
NEUTROPHILS NFR BLD: 86.3 % (ref 38–73)
NITRITE UR QL STRIP: NEGATIVE
NRBC BLD-RTO: 0 /100 WBC
OHS QRS DURATION: 74 MS
OHS QTC CALCULATION: 457 MS
PH UR STRIP: 6 [PH] (ref 5–8)
PLATELET # BLD AUTO: 334 K/UL (ref 150–450)
PMV BLD AUTO: 9.7 FL (ref 9.2–12.9)
POTASSIUM SERPL-SCNC: 3.4 MMOL/L (ref 3.5–5.1)
PROT SERPL-MCNC: 8 G/DL (ref 6–8.4)
PROT UR QL STRIP: NEGATIVE
RBC # BLD AUTO: 5.45 M/UL (ref 4–5.4)
SARS-COV-2 RDRP RESP QL NAA+PROBE: NEGATIVE
SODIUM SERPL-SCNC: 141 MMOL/L (ref 136–145)
SP GR UR STRIP: 1.02 (ref 1–1.03)
SPECIMEN SOURCE: NORMAL
URN SPEC COLLECT METH UR: NORMAL
UROBILINOGEN UR STRIP-ACNC: NEGATIVE EU/DL
WBC # BLD AUTO: 10.64 K/UL (ref 3.9–12.7)

## 2024-12-21 PROCEDURE — 81003 URINALYSIS AUTO W/O SCOPE: CPT | Performed by: NURSE PRACTITIONER

## 2024-12-21 PROCEDURE — 85025 COMPLETE CBC W/AUTO DIFF WBC: CPT | Performed by: NURSE PRACTITIONER

## 2024-12-21 PROCEDURE — 25000003 PHARM REV CODE 250: Performed by: EMERGENCY MEDICINE

## 2024-12-21 PROCEDURE — 87635 SARS-COV-2 COVID-19 AMP PRB: CPT | Performed by: NURSE PRACTITIONER

## 2024-12-21 PROCEDURE — 83690 ASSAY OF LIPASE: CPT | Performed by: NURSE PRACTITIONER

## 2024-12-21 PROCEDURE — 63600175 PHARM REV CODE 636 W HCPCS: Performed by: EMERGENCY MEDICINE

## 2024-12-21 PROCEDURE — 87502 INFLUENZA DNA AMP PROBE: CPT | Performed by: NURSE PRACTITIONER

## 2024-12-21 PROCEDURE — 96361 HYDRATE IV INFUSION ADD-ON: CPT

## 2024-12-21 PROCEDURE — 25500020 PHARM REV CODE 255: Performed by: EMERGENCY MEDICINE

## 2024-12-21 PROCEDURE — 96374 THER/PROPH/DIAG INJ IV PUSH: CPT

## 2024-12-21 PROCEDURE — 99285 EMERGENCY DEPT VISIT HI MDM: CPT | Mod: 25

## 2024-12-21 PROCEDURE — 80053 COMPREHEN METABOLIC PANEL: CPT | Performed by: NURSE PRACTITIONER

## 2024-12-21 RX ORDER — ONDANSETRON 4 MG/1
4 TABLET, ORALLY DISINTEGRATING ORAL EVERY 8 HOURS PRN
Qty: 12 TABLET | Refills: 0 | Status: SHIPPED | OUTPATIENT
Start: 2024-12-21

## 2024-12-21 RX ORDER — ACETAMINOPHEN 325 MG/1
650 TABLET ORAL
Status: COMPLETED | OUTPATIENT
Start: 2024-12-21 | End: 2024-12-21

## 2024-12-21 RX ORDER — ONDANSETRON HYDROCHLORIDE 2 MG/ML
4 INJECTION, SOLUTION INTRAVENOUS
Status: COMPLETED | OUTPATIENT
Start: 2024-12-21 | End: 2024-12-21

## 2024-12-21 RX ADMIN — ACETAMINOPHEN 650 MG: 325 TABLET ORAL at 04:12

## 2024-12-21 RX ADMIN — IOHEXOL 100 ML: 350 INJECTION, SOLUTION INTRAVENOUS at 03:12

## 2024-12-21 RX ADMIN — SODIUM CHLORIDE 500 ML: 9 INJECTION, SOLUTION INTRAVENOUS at 04:12

## 2024-12-21 RX ADMIN — ONDANSETRON 4 MG: 2 INJECTION INTRAMUSCULAR; INTRAVENOUS at 02:12

## 2024-12-21 RX ADMIN — SODIUM CHLORIDE 500 ML: 9 INJECTION, SOLUTION INTRAVENOUS at 03:12

## 2024-12-21 NOTE — FIRST PROVIDER EVALUATION
Emergency Department TeleTriage Encounter Note      CHIEF COMPLAINT    Chief Complaint   Patient presents with    Abdominal Pain     X 1 DAY    Diarrhea     X 1 DAY    Vomiting     X 1 DAY       VITAL SIGNS   Initial Vitals [12/21/24 1342]   BP Pulse Resp Temp SpO2   135/68 85 18 99 °F (37.2 °C) 97 %      MAP       --            ALLERGIES    Review of patient's allergies indicates:   Allergen Reactions    Pcn [penicillins] Anaphylaxis     Other reaction(s): Anaphylaxis     PROVIDER TRIAGE NOTE  Verbal consent for the teletriage evaluation was given by the patient at the start of the evaluation.  All efforts will be made to maintain patient's privacy during the evaluation.      This is a teletriage evaluation of a 89 y.o. female presenting to the ED per daughter with c/o HA, nausea, vomiting, diarrhea, and abd pain that started yesterday. Limited physical exam via telehealth: The patient is awake, alert, answering questions appropriately and is not in respiratory distress.  As the Teletriage provider, I performed an initial assessment and ordered appropriate labs and imaging studies, if any, to facilitate the patient's care once placed in the ED. Once a room is available, care and a full evaluation will be completed by an alternate ED provider.  Any additional orders and the final disposition will be determined by that provider.  All imaging and labs will not be followed-up by the Teletriage Team, including myself.      ORDERS  Labs Reviewed - No data to display    ED Orders (720h ago, onward)      Start Ordered     Status Ordering Provider    12/21/24 1350 12/21/24 1349  Vital signs  Every 2 hours         Ordered SKYLAR BISHOP    12/21/24 1349 12/21/24 1349  Diet NPO  Diet effective now         Ordered SKYLAR BISHOP    12/21/24 1349 12/21/24 1349  Insert peripheral IV  Once         Ordered SKYLAR BISHOP    12/21/24 1349 12/21/24 1349  CBC W/ AUTO DIFFERENTIAL  STAT         Ordered SKYLAR BISHOP    12/21/24 1349  12/21/24 1349  Comp. Metabolic Panel  STAT         Ordered SKYLAR BISHOP    12/21/24 1349 12/21/24 1349  Lipase  STAT         Ordered SKYLAR BISHOP    12/21/24 1349 12/21/24 1349  Urinalysis, Reflex to Urine Culture Urine, Clean Catch  STAT         Ordered SKYLAR BISHOP    12/21/24 1349 12/21/24 1349  COVID-19 Rapid Screening  STAT         Ordered SKYLAR BISHOP    12/21/24 1349 12/21/24 1349  Influenza antigen Nasopharyngeal Swab  Once         Ordered SKYLAR BISHOP              Virtual Visit Note: The provider triage portion of this emergency department evaluation and documentation was performed via [employer], a HIPAA-compliant telemedicine application, in concert with a tele-presenter in the room. A face to face patient evaluation with one of my colleagues will occur once the patient is placed in an emergency department room.      DISCLAIMER: This note was prepared with [employer] voice recognition transcription software. Garbled syntax, mangled pronouns, and other bizarre constructions may be attributed to that software system.

## 2024-12-21 NOTE — ED PROVIDER NOTES
Encounter Date: 12/21/2024       History     Chief Complaint   Patient presents with    Abdominal Pain     X 1 DAY    Diarrhea     X 1 DAY    Vomiting     X 1 DAY     89-year-old female with a past medical history diabetes mellitus, reflux disease, and hypertension presents for evaluation of abdominal pain, nausea/vomiting, and diarrhea.  The patient's daughter reports that the patient's symptoms started acutely 1 day ago and has been persistent since that time.  She also reports an associated headache.  There has been no associated shortness of breath, cough, congestion, fever/chills, or chest pain.  There are no alleviating or aggravating factors.      Review of patient's allergies indicates:   Allergen Reactions    Pcn [penicillins] Anaphylaxis     Other reaction(s): Anaphylaxis     Past Medical History:   Diagnosis Date    Diabetes mellitus type II     GERD (gastroesophageal reflux disease)     Hypertension      Past Surgical History:   Procedure Laterality Date    CATARACT EXTRACTION      CHOLECYSTECTOMY       Family History   Problem Relation Name Age of Onset    Cancer Mother      Heart disease Father      Amblyopia Neg Hx      Blindness Neg Hx      Cataracts Neg Hx      Glaucoma Neg Hx      Macular degeneration Neg Hx      Retinal detachment Neg Hx      Strabismus Neg Hx       Social History     Tobacco Use    Smoking status: Never    Smokeless tobacco: Never   Substance Use Topics    Alcohol use: No     Review of Systems   Constitutional:  Negative for chills and fever.   HENT:  Negative for congestion.    Respiratory:  Negative for cough and shortness of breath.    Cardiovascular:  Negative for chest pain.   Gastrointestinal:  Positive for abdominal pain, diarrhea, nausea and vomiting.   Genitourinary:  Negative for dysuria.   Musculoskeletal:  Negative for gait problem.   Skin:  Negative for color change.   Neurological:  Positive for headaches. Negative for dizziness and numbness.    Psychiatric/Behavioral:  Negative for agitation.        Physical Exam     Initial Vitals [12/21/24 1342]   BP Pulse Resp Temp SpO2   135/68 85 18 99 °F (37.2 °C) 97 %      MAP       --         Physical Exam    Nursing note and vitals reviewed.  Constitutional: She appears well-developed and well-nourished.   HENT:   Head: Atraumatic.   Eyes: EOM are normal. Pupils are equal, round, and reactive to light.   Neck:   Normal range of motion.  Cardiovascular:  Normal rate and regular rhythm.           Pulmonary/Chest: Breath sounds normal.   Abdominal: Abdomen is soft. Bowel sounds are normal. She exhibits no distension. There is abdominal tenderness.   Epigastric tenderness to palpation.  No rebound or guarding noted. There is no rebound and no guarding.   Musculoskeletal:         General: Normal range of motion.      Right shoulder: Normal.      Left shoulder: Normal.      Cervical back: Normal range of motion.     Neurological: She is alert and oriented to person, place, and time.   Skin: Skin is warm and dry.   Psychiatric: She has a normal mood and affect.         ED Course   Procedures  Labs Reviewed   CBC W/ AUTO DIFFERENTIAL - Abnormal       Result Value    WBC 10.64      RBC 5.45 (*)     Hemoglobin 13.4      Hematocrit 40.5      MCV 74 (*)     MCH 24.6 (*)     MCHC 33.1      RDW 15.8 (*)     Platelets 334      MPV 9.7      Immature Granulocytes 0.5      Gran # (ANC) 9.2 (*)     Immature Grans (Abs) 0.05 (*)     Lymph # 0.6 (*)     Mono # 0.8      Eos # 0.1      Baso # 0.02      nRBC 0      Gran % 86.3 (*)     Lymph % 5.5 (*)     Mono % 7.0      Eosinophil % 0.5      Basophil % 0.2      Differential Method Automated     COMPREHENSIVE METABOLIC PANEL - Abnormal    Sodium 141      Potassium 3.4 (*)     Chloride 107      CO2 26      Glucose 153 (*)     BUN 17      Creatinine 1.2      Calcium 9.3      Total Protein 8.0      Albumin 4.5      Total Bilirubin 0.5      Alkaline Phosphatase 77      AST 35      ALT 28       eGFR 43.0 (*)     Anion Gap 8     LIPASE    Lipase 28     URINALYSIS, REFLEX TO URINE CULTURE    Specimen UA Urine, Clean Catch      Color, UA Yellow      Appearance, UA Clear      pH, UA 6.0      Specific Gravity, UA 1.020      Protein, UA Negative      Glucose, UA Negative      Ketones, UA Negative      Bilirubin (UA) Negative      Occult Blood UA Negative      Nitrite, UA Negative      Urobilinogen, UA Negative      Leukocytes, UA Negative      Narrative:     Specimen Source->Urine   SARS-COV-2 RNA AMPLIFICATION, QUAL    SARS-CoV-2 RNA, Amplification, Qual Negative     INFLUENZA A AND B ANTIGEN    Influenza A, Molecular Negative      Influenza B, Molecular Negative      Flu A & B Source Nasal swab      Narrative:     Specimen Source->Nasopharyngeal Swab        ECG Results              EKG 12-lead (In process)        Collection Time Result Time QRS Duration OHS QTC Calculation    12/21/24 15:31:47 12/21/24 16:32:55 74 457                     In process by Interface, Lab In [facility] (12/21/24 16:33:03)                   Narrative:    Test Reason : R10.13,    Vent. Rate :  97 BPM     Atrial Rate :  97 BPM     P-R Int : 142 ms          QRS Dur :  74 ms      QT Int : 360 ms       P-R-T Axes :  55  48  38 degrees    QTcB Int : 457 ms    Sinus rhythm with Premature atrial complexes  Otherwise normal ECG  When compared with ECG of 06-May-2022 00:28,  Premature atrial complexes are now Present    Referred By: AAAREFERRAL SELF           Confirmed By:                                   Imaging Results              CT Abdomen Pelvis With IV Contrast NO Oral Contrast (Final result)  Result time 12/21/24 16:10:03      Final result by Bravo Kirk MD (12/21/24 16:10:03)                   Impression:      1. Numerous air-fluid levels throughout the small bowel and colon, nonspecific.  These could be incidental, or reflective of nonspecific enteritis-diarrheal illness.  No bowel obstruction or findings of colitis.  2.  Additional observations as described.      Electronically signed by: Bravo Lizamadustin  Date:    12/21/2024  Time:    16:10               Narrative:    EXAMINATION:  CT ABDOMEN PELVIS WITH IV CONTRAST    CLINICAL HISTORY:  Epigastric pain;    TECHNIQUE:  Axial post contrast imaging was performed with 100 mL Omnipaque 350 IV contrast.    COMPARISON:  Multiple prior exams.    FINDINGS:  CT ABDOMEN: Linear and bandlike subsegmental atelectasis involves the lung bases, which are otherwise clear.  There are dense mitral annular calcifications, with no pleural or pericardial effusion.    The liver enhances normally, with no biliary ductal dilatation.  The spleen is normal in size with normal enhancement.  The pancreas, adrenal glands and kidneys enhance normally, with hypoattenuating right renal lesions suggesting simple cyst.  No renal calculi or hydronephrosis.    Scattered calcified plaque involves normal-caliber abdominal aorta and iliac arteries.  No enlarged mesenteric or retroperitoneal lymph nodes.  There is no bowel wall thickening or bowel obstruction, with numerous small bowel and colon air-fluid levels.  The appendix is normal, with no ascites or intraperitoneal free air.    CT PELVIS: The rectum, uterus and urinary bladder enhance normally.  There is no pelvic free fluid or mass, with no enlarged pelvic or inguinal lymph nodes.  There is a 17 mm hypodense left adnexal lesion suggesting ovarian cyst.    The extraperitoneal soft tissues enhance normally.  There are scattered peripheral arterial vascular calcifications, with no acute fractures or destructive osseous lesions.  Intervertebral disc space narrowing and facet arthropathy involves the lumbar spine.                                       Medications   sodium chloride 0.9% bolus 500 mL 500 mL (500 mLs Intravenous New Bag 12/21/24 1627)   sodium chloride 0.9% bolus 500 mL 500 mL (0 mLs Intravenous Stopped 12/21/24 1602)   ondansetron injection 4 mg (4 mg  Intravenous Given 12/21/24 1459)   iohexoL (OMNIPAQUE 350) injection 100 mL (100 mLs Intravenous Given 12/21/24 1551)   acetaminophen tablet 650 mg (650 mg Oral Given 12/21/24 1626)     Medical Decision Making  89-year-old female presents for abdominal pain, nausea, vomiting, and diarrhea.    Initial differential diagnosis included but not limited to enteritis, colitis, and viral illness.    Amount and/or Complexity of Data Reviewed  Labs: ordered.  Radiology: ordered.    Risk  OTC drugs.  Prescription drug management.  Risk Details: The patient was emergently evaluated in the emergency department, her evaluation was significant for an elderly female with epigastric tenderness noted only.  The patient's labs showed no acute abnormalities.  The patient was treated here IV fluids, IV Zofran, and p.o. Tylenol.  The patient's CT scan does show an enteritis per Radiology.  This is likely a viral enteritis.  The patient is tolerating p.o. here in the emergency department.  She is stable for discharge to home.  She does not require further care or workup at this time.  She will be discharged home with ODT Zofran.  She is referred to primary care for follow-up.                                      Clinical Impression:  Final diagnoses:  [R10.13] Epigastric pain  [K52.9] Enteritis (Primary)          ED Disposition Condition    Discharge Stable          ED Prescriptions       Medication Sig Dispense Start Date End Date Auth. Provider    ondansetron (ZOFRAN-ODT) 4 MG TbDL Take 1 tablet (4 mg total) by mouth every 8 (eight) hours as needed (nausea/vomiting). 12 tablet 12/21/2024 -- Landon Parra MD          Follow-up Information       Follow up With Specialties Details Why Contact Info    Mervin Killian MD Family Medicine Schedule an appointment as soon as possible for a visit   [address] [facility] [facility] [place] [address]  [phone]               Landon Parra MD  12/21/24 3452

## 2025-02-24 DIAGNOSIS — M79.604 RIGHT LEG PAIN: Primary | ICD-10-CM

## 2025-02-26 ENCOUNTER — HOSPITAL ENCOUNTER (OUTPATIENT)
Dept: RADIOLOGY | Facility: HOSPITAL | Age: OVER 89
Discharge: HOME | End: 2025-02-26
Attending: NURSE PRACTITIONER
Payer: MEDICARE

## 2025-02-26 DIAGNOSIS — M79.604 RIGHT LEG PAIN: ICD-10-CM

## 2025-02-26 PROCEDURE — 93971 EXTREMITY STUDY: CPT | Mod: TC,PO,RT

## 2025-02-26 PROCEDURE — 72110 X-RAY EXAM L-2 SPINE 4/>VWS: CPT | Mod: TC,PO

## 2025-02-26 PROCEDURE — 72110 X-RAY EXAM L-2 SPINE 4/>VWS: CPT | Mod: 26,,, | Performed by: RADIOLOGY

## 2025-02-26 PROCEDURE — 93971 EXTREMITY STUDY: CPT | Mod: 26,RT,, | Performed by: RADIOLOGY

## 2025-04-07 DIAGNOSIS — M25.551 RIGHT HIP PAIN: Primary | ICD-10-CM

## 2025-04-07 DIAGNOSIS — R10.9 ABDOMINAL WALL PAIN: ICD-10-CM

## 2025-04-08 ENCOUNTER — TELEPHONE (OUTPATIENT)
Dept: OPHTHALMOLOGY | Facility: CLINIC | Age: OVER 89
End: 2025-04-08
Payer: MEDICARE

## 2025-04-08 NOTE — TELEPHONE ENCOUNTER
----- Message from Ofelia sent at 4/8/2025 10:29 AM CDT -----  Regarding: Appt. Request  Type:Appointment RequestCaller is requesting an appointment:Name of Caller:pt daughter Michelle Symptoms:annual eye exam Would the patient rather a call back or a response via [facility]? Call back Best Call Back Number:[phone] Additional Information:  please call to advise, Thank You.

## 2025-04-17 ENCOUNTER — HOSPITAL ENCOUNTER (OUTPATIENT)
Dept: RADIOLOGY | Facility: HOSPITAL | Age: OVER 89
Discharge: HOME | End: 2025-04-17
Attending: FAMILY MEDICINE
Payer: MEDICARE

## 2025-04-17 DIAGNOSIS — M25.552 LEFT HIP PAIN: ICD-10-CM

## 2025-04-17 DIAGNOSIS — M25.551 RIGHT HIP PAIN: ICD-10-CM

## 2025-04-17 DIAGNOSIS — R10.9 ABDOMINAL WALL PAIN: ICD-10-CM

## 2025-04-17 DIAGNOSIS — M25.551 RIGHT HIP PAIN: Primary | ICD-10-CM

## 2025-04-17 PROCEDURE — 76856 US EXAM PELVIC COMPLETE: CPT | Mod: TC,PO

## 2025-04-17 PROCEDURE — 76700 US EXAM ABDOM COMPLETE: CPT | Mod: TC,PO

## 2025-04-17 PROCEDURE — 76700 US EXAM ABDOM COMPLETE: CPT | Mod: 26,,, | Performed by: RADIOLOGY

## 2025-04-17 PROCEDURE — 76856 US EXAM PELVIC COMPLETE: CPT | Mod: 26,,, | Performed by: RADIOLOGY

## 2025-04-25 ENCOUNTER — HOSPITAL ENCOUNTER (OUTPATIENT)
Dept: RADIOLOGY | Facility: HOSPITAL | Age: OVER 89
Discharge: HOME | End: 2025-04-25
Attending: FAMILY MEDICINE
Payer: MEDICARE

## 2025-04-25 PROCEDURE — 73521 X-RAY EXAM HIPS BI 2 VIEWS: CPT | Mod: TC,PO

## 2025-04-25 PROCEDURE — 73521 X-RAY EXAM HIPS BI 2 VIEWS: CPT | Mod: 26,,, | Performed by: RADIOLOGY

## 2025-05-01 DIAGNOSIS — M79.605 LEFT LEG PAIN: Primary | ICD-10-CM

## 2025-05-01 DIAGNOSIS — M79.604 RIGHT LEG PAIN: ICD-10-CM

## 2025-05-17 ENCOUNTER — HOSPITAL ENCOUNTER (OUTPATIENT)
Dept: RADIOLOGY | Facility: HOSPITAL | Age: OVER 89
Discharge: HOME | End: 2025-05-17
Attending: FAMILY MEDICINE
Payer: MEDICARE

## 2025-05-17 DIAGNOSIS — M79.604 RIGHT LEG PAIN: ICD-10-CM

## 2025-05-17 DIAGNOSIS — M79.605 LEFT LEG PAIN: ICD-10-CM

## 2025-05-17 PROCEDURE — 93925 LOWER EXTREMITY STUDY: CPT | Mod: TC

## 2025-05-17 PROCEDURE — 93925 LOWER EXTREMITY STUDY: CPT | Mod: 26,,, | Performed by: RADIOLOGY

## 2025-06-19 ENCOUNTER — OFFICE VISIT (OUTPATIENT)
Dept: OPHTHALMOLOGY | Facility: CLINIC | Age: OVER 89
End: 2025-06-19
Payer: MEDICARE

## 2025-06-19 DIAGNOSIS — H11.001 PTERYGIUM, RIGHT: ICD-10-CM

## 2025-06-19 DIAGNOSIS — H43.823 VITREOMACULAR ADHESION, BILATERAL: ICD-10-CM

## 2025-06-19 DIAGNOSIS — H35.3221 EXUDATIVE AGE-RELATED MACULAR DEGENERATION OF LEFT EYE WITH ACTIVE CHOROIDAL NEOVASCULARIZATION: ICD-10-CM

## 2025-06-19 DIAGNOSIS — H35.033 HYPERTENSIVE RETINOPATHY, BILATERAL: ICD-10-CM

## 2025-06-19 DIAGNOSIS — H35.3212 EXUDATIVE AGE-RELATED MACULAR DEGENERATION OF RIGHT EYE WITH INACTIVE CHOROIDAL NEOVASCULARIZATION: Primary | ICD-10-CM

## 2025-06-19 PROCEDURE — 1159F MED LIST DOCD IN RCRD: CPT | Mod: CPTII,S$GLB,, | Performed by: OPHTHALMOLOGY

## 2025-06-19 PROCEDURE — 1160F RVW MEDS BY RX/DR IN RCRD: CPT | Mod: CPTII,S$GLB,, | Performed by: OPHTHALMOLOGY

## 2025-06-19 PROCEDURE — 92134 CPTRZ OPH DX IMG PST SGM RTA: CPT | Mod: S$GLB,,, | Performed by: OPHTHALMOLOGY

## 2025-06-19 PROCEDURE — 92014 COMPRE OPH EXAM EST PT 1/>: CPT | Mod: S$GLB,,, | Performed by: OPHTHALMOLOGY

## 2025-06-19 PROCEDURE — 1101F PT FALLS ASSESS-DOCD LE1/YR: CPT | Mod: CPTII,S$GLB,, | Performed by: OPHTHALMOLOGY

## 2025-06-19 PROCEDURE — 99999 PR PBB SHADOW E&M-EST. PATIENT-LVL IV: CPT | Mod: PBBFAC,,, | Performed by: OPHTHALMOLOGY

## 2025-06-19 PROCEDURE — 1126F AMNT PAIN NOTED NONE PRSNT: CPT | Mod: CPTII,S$GLB,, | Performed by: OPHTHALMOLOGY

## 2025-06-19 PROCEDURE — 92201 OPSCPY EXTND RTA DRAW UNI/BI: CPT | Mod: S$GLB,,, | Performed by: OPHTHALMOLOGY

## 2025-06-19 PROCEDURE — 3288F FALL RISK ASSESSMENT DOCD: CPT | Mod: CPTII,S$GLB,, | Performed by: OPHTHALMOLOGY

## 2025-06-19 RX ORDER — FLUOCINOLONE ACETONIDE 0.11 MG/ML
OIL TOPICAL
COMMUNITY
Start: 2025-04-29

## 2025-06-19 RX ORDER — METOPROLOL SUCCINATE 25 MG/1
25 TABLET, EXTENDED RELEASE ORAL
COMMUNITY

## 2025-06-19 RX ORDER — PANTOPRAZOLE SODIUM 40 MG/1
40 TABLET, DELAYED RELEASE ORAL EVERY MORNING
COMMUNITY
Start: 2025-05-14

## 2025-06-19 RX ORDER — KETOCONAZOLE 20 MG/ML
SHAMPOO, SUSPENSION TOPICAL
COMMUNITY
Start: 2025-04-29

## 2025-06-19 RX ORDER — MULTIVITAMIN
1 TABLET ORAL
COMMUNITY
Start: 2025-05-09

## 2025-06-19 RX ORDER — HYDROCORTISONE 25 MG/G
CREAM TOPICAL 2 TIMES DAILY PRN
COMMUNITY
Start: 2025-04-29

## 2025-06-19 RX ORDER — GABAPENTIN 300 MG/1
300 CAPSULE ORAL NIGHTLY
COMMUNITY
Start: 2025-04-02

## 2025-06-19 RX ORDER — CHOLECALCIFEROL (VITAMIN D3) 25 MCG
1000 TABLET ORAL
COMMUNITY
Start: 2025-05-08

## 2025-06-19 RX ORDER — BENZONATATE 100 MG/1
100 CAPSULE ORAL 3 TIMES DAILY
COMMUNITY
Start: 2025-03-07

## 2025-06-19 NOTE — PROGRESS NOTES
HPI    C/o worsening VA ou, irritation ou, denies floaters or flashes.    Latanoprost ou qhs- ran out 10 days ago  Last edited by Edilia Rai on 6/19/2025  2:47 PM.               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 HTN RET OU    3. NS OD - no Sx because of #1  PCIOL OS    4. Pterygia OD  Increased vascularity with gelatinous appearance - Had eval with Dr. Calero - low risk for neoplasia    5. ?POAG in past  Was on Xalatan  Had stopped  IOP ok , can hold IOP gtts for now.  If IOP increases, can resume down the road        12 months OCT